# Patient Record
Sex: FEMALE | Race: WHITE | Employment: OTHER | URBAN - METROPOLITAN AREA
[De-identification: names, ages, dates, MRNs, and addresses within clinical notes are randomized per-mention and may not be internally consistent; named-entity substitution may affect disease eponyms.]

---

## 2018-02-01 ENCOUNTER — HOSPITAL ENCOUNTER (OUTPATIENT)
Dept: PHYSICAL THERAPY | Age: 73
Discharge: HOME | End: 2018-02-01
Attending: ORTHOPAEDIC SURGERY | Admitting: ORTHOPAEDIC SURGERY

## 2018-02-01 ENCOUNTER — HOSPITAL ENCOUNTER (OUTPATIENT)
Dept: OCCUPATIONAL THERAPY | Age: 73
Discharge: HOME | End: 2018-01-31
Admitting: ORTHOPAEDIC SURGERY

## 2018-02-01 ENCOUNTER — HOSPITAL ENCOUNTER (OUTPATIENT)
Dept: OCCUPATIONAL THERAPY | Age: 73
Discharge: HOME | End: 2018-02-28
Admitting: ORTHOPAEDIC SURGERY

## 2018-03-01 ENCOUNTER — HOSPITAL ENCOUNTER (OUTPATIENT)
Dept: OCCUPATIONAL THERAPY | Age: 73
Discharge: HOME | End: 2018-03-31
Attending: ORTHOPAEDIC SURGERY | Admitting: ORTHOPAEDIC SURGERY

## 2018-09-10 ENCOUNTER — HOSPITAL ENCOUNTER (OUTPATIENT)
Age: 73
Discharge: HOME | End: 2018-09-10
Payer: MEDICARE

## 2018-09-10 LAB
A/G RATIO: 0.6 (ref 1.1–2.2)
ALBUMIN SERPL-MCNC: 3.7 G/DL (ref 3.4–5)
ALP BLD-CCNC: 393 U/L (ref 40–129)
ALT SERPL-CCNC: 35 U/L (ref 10–40)
ANION GAP SERPL CALCULATED.3IONS-SCNC: 14 MMOL/L (ref 3–16)
AST SERPL-CCNC: 53 U/L (ref 15–37)
BASOPHILS ABSOLUTE: 0.1 K/UL (ref 0–0.2)
BASOPHILS RELATIVE PERCENT: 0.8 %
BILIRUB SERPL-MCNC: 0.5 MG/DL (ref 0–1)
BILIRUBIN DIRECT: <0.2 MG/DL (ref 0–0.3)
BILIRUBIN, INDIRECT: NORMAL MG/DL (ref 0–1)
BUN BLDV-MCNC: 13 MG/DL (ref 7–20)
CALCIUM SERPL-MCNC: 9.9 MG/DL (ref 8.3–10.6)
CHLORIDE BLD-SCNC: 102 MMOL/L (ref 99–110)
CHOLESTEROL, FASTING: 173 MG/DL (ref 0–199)
CO2: 24 MMOL/L (ref 21–32)
CREAT SERPL-MCNC: 0.7 MG/DL (ref 0.6–1.2)
EOSINOPHILS ABSOLUTE: 1 K/UL (ref 0–0.6)
EOSINOPHILS RELATIVE PERCENT: 12.2 %
GFR AFRICAN AMERICAN: >60
GFR NON-AFRICAN AMERICAN: >60
GLOBULIN: 5.8 G/DL
GLUCOSE BLD-MCNC: 113 MG/DL (ref 70–99)
HCT VFR BLD CALC: 42.5 % (ref 36–48)
HDLC SERPL-MCNC: 61 MG/DL (ref 40–60)
HEMOGLOBIN: 14.1 G/DL (ref 12–16)
LDL CHOLESTEROL CALCULATED: 99 MG/DL
LYMPHOCYTES ABSOLUTE: 2.1 K/UL (ref 1–5.1)
LYMPHOCYTES RELATIVE PERCENT: 24.2 %
MCH RBC QN AUTO: 29.8 PG (ref 26–34)
MCHC RBC AUTO-ENTMCNC: 33 G/DL (ref 31–36)
MCV RBC AUTO: 90.2 FL (ref 80–100)
MONOCYTES ABSOLUTE: 0.5 K/UL (ref 0–1.3)
MONOCYTES RELATIVE PERCENT: 5.8 %
NEUTROPHILS ABSOLUTE: 4.9 K/UL (ref 1.7–7.7)
NEUTROPHILS RELATIVE PERCENT: 57 %
PDW BLD-RTO: 14.7 % (ref 12.4–15.4)
PLATELET # BLD: 201 K/UL (ref 135–450)
PMV BLD AUTO: 9.1 FL (ref 5–10.5)
POTASSIUM SERPL-SCNC: 4.4 MMOL/L (ref 3.5–5.1)
RBC # BLD: 4.71 M/UL (ref 4–5.2)
SODIUM BLD-SCNC: 140 MMOL/L (ref 136–145)
TOTAL PROTEIN: 9.5 G/DL (ref 6.4–8.2)
TRIGLYCERIDE, FASTING: 65 MG/DL (ref 0–150)
TSH SERPL DL<=0.05 MIU/L-ACNC: 9.38 UIU/ML (ref 0.27–4.2)
VITAMIN B-12: >2000 PG/ML (ref 211–911)
VITAMIN D 25-HYDROXY: 23.2 NG/ML
VLDLC SERPL CALC-MCNC: 13 MG/DL
WBC # BLD: 8.5 K/UL (ref 4–11)

## 2018-09-10 PROCEDURE — 36415 COLL VENOUS BLD VENIPUNCTURE: CPT

## 2018-09-10 PROCEDURE — 80053 COMPREHEN METABOLIC PANEL: CPT

## 2018-09-10 PROCEDURE — 80061 LIPID PANEL: CPT

## 2018-09-10 PROCEDURE — 82248 BILIRUBIN DIRECT: CPT

## 2018-09-10 PROCEDURE — 84443 ASSAY THYROID STIM HORMONE: CPT

## 2018-09-10 PROCEDURE — 82607 VITAMIN B-12: CPT

## 2018-09-10 PROCEDURE — 82306 VITAMIN D 25 HYDROXY: CPT

## 2018-09-10 PROCEDURE — 85025 COMPLETE CBC W/AUTO DIFF WBC: CPT

## 2019-03-22 ENCOUNTER — HOSPITAL ENCOUNTER (OUTPATIENT)
Age: 74
Discharge: HOME | End: 2019-03-22
Payer: MEDICARE

## 2019-03-22 LAB
ALBUMIN SERPL-MCNC: 3.3 G/DL (ref 3.4–5)
ALP BLD-CCNC: 339 U/L (ref 40–129)
ALT SERPL-CCNC: 29 U/L (ref 10–40)
AST SERPL-CCNC: 51 U/L (ref 15–37)
BILIRUB SERPL-MCNC: 0.5 MG/DL (ref 0–1)
BILIRUBIN DIRECT: <0.2 MG/DL (ref 0–0.3)
BILIRUBIN, INDIRECT: ABNORMAL MG/DL (ref 0–1)
RHEUMATOID FACTOR: 576 IU/ML
T3 TOTAL: 1.47 NG/ML (ref 0.8–2)
T4 TOTAL: 10.2 UG/DL (ref 4.5–10.9)
TOTAL PROTEIN: 9.3 G/DL (ref 6.4–8.2)
TSH SERPL DL<=0.05 MIU/L-ACNC: 2.6 UIU/ML (ref 0.27–4.2)
URIC ACID, SERUM: 7.7 MG/DL (ref 2.6–6)

## 2019-03-22 PROCEDURE — 80076 HEPATIC FUNCTION PANEL: CPT

## 2019-03-22 PROCEDURE — 84436 ASSAY OF TOTAL THYROXINE: CPT

## 2019-03-22 PROCEDURE — 86431 RHEUMATOID FACTOR QUANT: CPT

## 2019-03-22 PROCEDURE — 84550 ASSAY OF BLOOD/URIC ACID: CPT

## 2019-03-22 PROCEDURE — 84480 ASSAY TRIIODOTHYRONINE (T3): CPT

## 2019-03-22 PROCEDURE — 36415 COLL VENOUS BLD VENIPUNCTURE: CPT

## 2019-03-22 PROCEDURE — 84443 ASSAY THYROID STIM HORMONE: CPT

## 2019-03-30 ENCOUNTER — APPOINTMENT (OUTPATIENT)
Dept: GENERAL RADIOLOGY | Age: 74
DRG: 871 | End: 2019-03-30
Payer: MEDICARE

## 2019-03-30 ENCOUNTER — HOSPITAL ENCOUNTER (INPATIENT)
Age: 74
LOS: 5 days | Discharge: HOME | DRG: 871 | End: 2019-04-04
Attending: EMERGENCY MEDICINE | Admitting: INTERNAL MEDICINE
Payer: MEDICARE

## 2019-03-30 DIAGNOSIS — E87.1 HYPONATREMIA: ICD-10-CM

## 2019-03-30 DIAGNOSIS — N17.9 AKI (ACUTE KIDNEY INJURY) (HCC): ICD-10-CM

## 2019-03-30 DIAGNOSIS — A41.9 SEVERE SEPSIS (HCC): Primary | ICD-10-CM

## 2019-03-30 DIAGNOSIS — J18.9 PNEUMONIA DUE TO ORGANISM: ICD-10-CM

## 2019-03-30 DIAGNOSIS — R65.20 SEVERE SEPSIS (HCC): Primary | ICD-10-CM

## 2019-03-30 PROBLEM — E86.0 DEHYDRATION: Status: ACTIVE | Noted: 2019-03-30

## 2019-03-30 PROBLEM — Z87.891 HISTORY OF TOBACCO ABUSE: Status: ACTIVE | Noted: 2019-03-30

## 2019-03-30 LAB
A/G RATIO: 0.5 (ref 1.1–2.2)
ALBUMIN SERPL-MCNC: 2.7 G/DL (ref 3.4–5)
ALP BLD-CCNC: 238 U/L (ref 40–129)
ALT SERPL-CCNC: 13 U/L (ref 10–40)
ANION GAP SERPL CALCULATED.3IONS-SCNC: 13 MMOL/L (ref 3–16)
AST SERPL-CCNC: 17 U/L (ref 15–37)
BASOPHILS ABSOLUTE: 0.1 K/UL (ref 0–0.2)
BASOPHILS RELATIVE PERCENT: 0.2 %
BILIRUB SERPL-MCNC: 1 MG/DL (ref 0–1)
BUN BLDV-MCNC: 36 MG/DL (ref 7–20)
CALCIUM SERPL-MCNC: 9 MG/DL (ref 8.3–10.6)
CHLORIDE BLD-SCNC: 92 MMOL/L (ref 99–110)
CO2: 19 MMOL/L (ref 21–32)
CREAT SERPL-MCNC: 1.2 MG/DL (ref 0.6–1.2)
EOSINOPHILS ABSOLUTE: 0 K/UL (ref 0–0.6)
EOSINOPHILS RELATIVE PERCENT: 0 %
GFR AFRICAN AMERICAN: 53
GFR NON-AFRICAN AMERICAN: 44
GLOBULIN: 5.5 G/DL
GLUCOSE BLD-MCNC: 194 MG/DL (ref 70–99)
HCT VFR BLD CALC: 36 % (ref 36–48)
HEMOGLOBIN: 12 G/DL (ref 12–16)
LACTIC ACID, SEPSIS: 1.3 MMOL/L (ref 0.4–1.9)
LACTIC ACID: 1 MMOL/L (ref 0.4–2)
LYMPHOCYTES ABSOLUTE: 1 K/UL (ref 1–5.1)
LYMPHOCYTES RELATIVE PERCENT: 3.1 %
MCH RBC QN AUTO: 29.7 PG (ref 26–34)
MCHC RBC AUTO-ENTMCNC: 33.4 G/DL (ref 31–36)
MCV RBC AUTO: 88.9 FL (ref 80–100)
MONOCYTES ABSOLUTE: 1.2 K/UL (ref 0–1.3)
MONOCYTES RELATIVE PERCENT: 3.7 %
NEUTROPHILS ABSOLUTE: 29.2 K/UL (ref 1.7–7.7)
NEUTROPHILS RELATIVE PERCENT: 93 %
PDW BLD-RTO: 13.8 % (ref 12.4–15.4)
PLATELET # BLD: 208 K/UL (ref 135–450)
PMV BLD AUTO: 8.5 FL (ref 5–10.5)
POTASSIUM SERPL-SCNC: 3.8 MMOL/L (ref 3.5–5.1)
RAPID INFLUENZA  B AGN: NEGATIVE
RAPID INFLUENZA A AGN: NEGATIVE
RBC # BLD: 4.05 M/UL (ref 4–5.2)
SODIUM BLD-SCNC: 124 MMOL/L (ref 136–145)
TOTAL PROTEIN: 8.2 G/DL (ref 6.4–8.2)
WBC # BLD: 31.4 K/UL (ref 4–11)

## 2019-03-30 PROCEDURE — 2580000003 HC RX 258: Performed by: INTERNAL MEDICINE

## 2019-03-30 PROCEDURE — 36415 COLL VENOUS BLD VENIPUNCTURE: CPT

## 2019-03-30 PROCEDURE — 87449 NOS EACH ORGANISM AG IA: CPT

## 2019-03-30 PROCEDURE — 94667 MNPJ CHEST WALL 1ST: CPT

## 2019-03-30 PROCEDURE — 87804 INFLUENZA ASSAY W/OPTIC: CPT

## 2019-03-30 PROCEDURE — 87070 CULTURE OTHR SPECIMN AEROBIC: CPT

## 2019-03-30 PROCEDURE — 80053 COMPREHEN METABOLIC PANEL: CPT

## 2019-03-30 PROCEDURE — 96361 HYDRATE IV INFUSION ADD-ON: CPT

## 2019-03-30 PROCEDURE — 2580000003 HC RX 258: Performed by: EMERGENCY MEDICINE

## 2019-03-30 PROCEDURE — 6360000002 HC RX W HCPCS: Performed by: INTERNAL MEDICINE

## 2019-03-30 PROCEDURE — 96365 THER/PROPH/DIAG IV INF INIT: CPT

## 2019-03-30 PROCEDURE — 87040 BLOOD CULTURE FOR BACTERIA: CPT

## 2019-03-30 PROCEDURE — 83605 ASSAY OF LACTIC ACID: CPT

## 2019-03-30 PROCEDURE — 6370000000 HC RX 637 (ALT 250 FOR IP): Performed by: INTERNAL MEDICINE

## 2019-03-30 PROCEDURE — 93005 ELECTROCARDIOGRAM TRACING: CPT | Performed by: EMERGENCY MEDICINE

## 2019-03-30 PROCEDURE — 87077 CULTURE AEROBIC IDENTIFY: CPT

## 2019-03-30 PROCEDURE — 87186 SC STD MICRODIL/AGAR DIL: CPT

## 2019-03-30 PROCEDURE — 6370000000 HC RX 637 (ALT 250 FOR IP): Performed by: EMERGENCY MEDICINE

## 2019-03-30 PROCEDURE — 94150 VITAL CAPACITY TEST: CPT

## 2019-03-30 PROCEDURE — 1200000000 HC SEMI PRIVATE

## 2019-03-30 PROCEDURE — 6360000002 HC RX W HCPCS: Performed by: EMERGENCY MEDICINE

## 2019-03-30 PROCEDURE — 85025 COMPLETE CBC W/AUTO DIFF WBC: CPT

## 2019-03-30 PROCEDURE — 94640 AIRWAY INHALATION TREATMENT: CPT

## 2019-03-30 PROCEDURE — 87205 SMEAR GRAM STAIN: CPT

## 2019-03-30 PROCEDURE — 99291 CRITICAL CARE FIRST HOUR: CPT

## 2019-03-30 PROCEDURE — 71046 X-RAY EXAM CHEST 2 VIEWS: CPT

## 2019-03-30 RX ORDER — SODIUM CHLORIDE 0.9 % (FLUSH) 0.9 %
10 SYRINGE (ML) INJECTION PRN
Status: DISCONTINUED | OUTPATIENT
Start: 2019-03-30 | End: 2019-04-04 | Stop reason: HOSPADM

## 2019-03-30 RX ORDER — SODIUM CHLORIDE 0.9 % (FLUSH) 0.9 %
10 SYRINGE (ML) INJECTION EVERY 12 HOURS SCHEDULED
Status: DISCONTINUED | OUTPATIENT
Start: 2019-03-30 | End: 2019-04-04 | Stop reason: HOSPADM

## 2019-03-30 RX ORDER — ACETAMINOPHEN 325 MG/1
650 TABLET ORAL EVERY 4 HOURS PRN
Status: DISCONTINUED | OUTPATIENT
Start: 2019-03-30 | End: 2019-04-02

## 2019-03-30 RX ORDER — SODIUM CHLORIDE 9 MG/ML
INJECTION, SOLUTION INTRAVENOUS CONTINUOUS
Status: DISCONTINUED | OUTPATIENT
Start: 2019-03-30 | End: 2019-04-01

## 2019-03-30 RX ORDER — LEVOTHYROXINE SODIUM 0.05 MG/1
TABLET ORAL
Refills: 1 | COMMUNITY
Start: 2018-12-31

## 2019-03-30 RX ORDER — LANOLIN ALCOHOL/MO/W.PET/CERES
1000 CREAM (GRAM) TOPICAL DAILY
COMMUNITY

## 2019-03-30 RX ORDER — ALBUTEROL SULFATE 90 UG/1
2 AEROSOL, METERED RESPIRATORY (INHALATION) EVERY 4 HOURS PRN
Status: DISCONTINUED | OUTPATIENT
Start: 2019-03-30 | End: 2019-03-31

## 2019-03-30 RX ORDER — IPRATROPIUM BROMIDE AND ALBUTEROL SULFATE 2.5; .5 MG/3ML; MG/3ML
1 SOLUTION RESPIRATORY (INHALATION)
Status: DISCONTINUED | OUTPATIENT
Start: 2019-03-30 | End: 2019-03-31

## 2019-03-30 RX ORDER — ONDANSETRON 2 MG/ML
4 INJECTION INTRAMUSCULAR; INTRAVENOUS EVERY 6 HOURS PRN
Status: DISCONTINUED | OUTPATIENT
Start: 2019-03-30 | End: 2019-04-04 | Stop reason: HOSPADM

## 2019-03-30 RX ORDER — GUAIFENESIN/DEXTROMETHORPHAN 100-10MG/5
10 SYRUP ORAL 3 TIMES DAILY PRN
Status: DISCONTINUED | OUTPATIENT
Start: 2019-03-30 | End: 2019-04-04 | Stop reason: HOSPADM

## 2019-03-30 RX ORDER — DOXYCYCLINE HYCLATE 100 MG
100 TABLET ORAL ONCE
Status: COMPLETED | OUTPATIENT
Start: 2019-03-30 | End: 2019-03-30

## 2019-03-30 RX ORDER — LEVOTHYROXINE SODIUM 0.05 MG/1
50 TABLET ORAL DAILY
Status: DISCONTINUED | OUTPATIENT
Start: 2019-03-31 | End: 2019-04-04 | Stop reason: HOSPADM

## 2019-03-30 RX ORDER — 0.9 % SODIUM CHLORIDE 0.9 %
30 INTRAVENOUS SOLUTION INTRAVENOUS ONCE
Status: COMPLETED | OUTPATIENT
Start: 2019-03-30 | End: 2019-03-30

## 2019-03-30 RX ORDER — ACETAMINOPHEN 500 MG
1000 TABLET ORAL ONCE
Status: COMPLETED | OUTPATIENT
Start: 2019-03-30 | End: 2019-03-30

## 2019-03-30 RX ADMIN — SODIUM CHLORIDE: 9 INJECTION, SOLUTION INTRAVENOUS at 20:55

## 2019-03-30 RX ADMIN — ENOXAPARIN SODIUM 40 MG: 40 INJECTION SUBCUTANEOUS at 20:55

## 2019-03-30 RX ADMIN — ACETAMINOPHEN 1000 MG: 500 TABLET ORAL at 17:06

## 2019-03-30 RX ADMIN — DOXYCYCLINE HYCLATE 100 MG: 100 TABLET, COATED ORAL at 17:06

## 2019-03-30 RX ADMIN — CEFTRIAXONE SODIUM 1 G: 1 INJECTION, POWDER, FOR SOLUTION INTRAMUSCULAR; INTRAVENOUS at 17:06

## 2019-03-30 RX ADMIN — SODIUM CHLORIDE 1000 ML: 9 INJECTION, SOLUTION INTRAVENOUS at 17:07

## 2019-03-30 RX ADMIN — IPRATROPIUM BROMIDE AND ALBUTEROL SULFATE 1 AMPULE: .5; 3 SOLUTION RESPIRATORY (INHALATION) at 21:04

## 2019-03-30 ASSESSMENT — PAIN DESCRIPTION - ORIENTATION
ORIENTATION: RIGHT
ORIENTATION: RIGHT

## 2019-03-30 ASSESSMENT — PAIN DESCRIPTION - LOCATION
LOCATION: RIB CAGE
LOCATION: RIB CAGE

## 2019-03-30 ASSESSMENT — PAIN SCALES - GENERAL
PAINLEVEL_OUTOF10: 8
PAINLEVEL_OUTOF10: 6
PAINLEVEL_OUTOF10: 8
PAINLEVEL_OUTOF10: 0

## 2019-03-30 ASSESSMENT — PAIN DESCRIPTION - PAIN TYPE
TYPE: ACUTE PAIN
TYPE: ACUTE PAIN

## 2019-03-30 NOTE — PROGRESS NOTES
Patient admitted to room 220 from ED  Patient oriented to room, call light, bed rails, phone, lights and bathroom. Patient instructed about the schedule of the day including: vital sign frequency, lab draws, possible tests, frequency of MD and staff rounds, including RN/MD rounding together at bedside, daily weights, and I &O's. Patient instructed about prescribed diet, how to use 8MENU, and television. No bed alarm in place, patient aware of placement and reason. patient aware of placement and reason. Bed locked, in lowest position, side rails up 2/4, call light within reach. Will continue to monitor.   MISTIAZ

## 2019-03-31 LAB
ANION GAP SERPL CALCULATED.3IONS-SCNC: 10 MMOL/L (ref 3–16)
BASOPHILS ABSOLUTE: 0 K/UL (ref 0–0.2)
BASOPHILS RELATIVE PERCENT: 0.2 %
BUN BLDV-MCNC: 29 MG/DL (ref 7–20)
CALCIUM SERPL-MCNC: 8.4 MG/DL (ref 8.3–10.6)
CHLORIDE BLD-SCNC: 100 MMOL/L (ref 99–110)
CO2: 18 MMOL/L (ref 21–32)
CREAT SERPL-MCNC: 0.9 MG/DL (ref 0.6–1.2)
EKG ATRIAL RATE: 94 BPM
EKG DIAGNOSIS: NORMAL
EKG P AXIS: 67 DEGREES
EKG P-R INTERVAL: 132 MS
EKG Q-T INTERVAL: 348 MS
EKG QRS DURATION: 90 MS
EKG QTC CALCULATION (BAZETT): 435 MS
EKG R AXIS: 76 DEGREES
EKG T AXIS: 50 DEGREES
EKG VENTRICULAR RATE: 94 BPM
EOSINOPHILS ABSOLUTE: 0.4 K/UL (ref 0–0.6)
EOSINOPHILS RELATIVE PERCENT: 1.9 %
GFR AFRICAN AMERICAN: >60
GFR NON-AFRICAN AMERICAN: >60
GLUCOSE BLD-MCNC: 125 MG/DL (ref 70–99)
HCT VFR BLD CALC: 34 % (ref 36–48)
HEMOGLOBIN: 11.4 G/DL (ref 12–16)
L. PNEUMOPHILA SEROGP 1 UR AG: NORMAL
LYMPHOCYTES ABSOLUTE: 2 K/UL (ref 1–5.1)
LYMPHOCYTES RELATIVE PERCENT: 8.5 %
MAGNESIUM: 2.2 MG/DL (ref 1.8–2.4)
MCH RBC QN AUTO: 29.8 PG (ref 26–34)
MCHC RBC AUTO-ENTMCNC: 33.5 G/DL (ref 31–36)
MCV RBC AUTO: 88.8 FL (ref 80–100)
MONOCYTES ABSOLUTE: 1 K/UL (ref 0–1.3)
MONOCYTES RELATIVE PERCENT: 4.5 %
NEUTROPHILS ABSOLUTE: 20 K/UL (ref 1.7–7.7)
NEUTROPHILS RELATIVE PERCENT: 84.9 %
PDW BLD-RTO: 13.7 % (ref 12.4–15.4)
PLATELET # BLD: 178 K/UL (ref 135–450)
PMV BLD AUTO: 8.7 FL (ref 5–10.5)
POTASSIUM REFLEX MAGNESIUM: 3.3 MMOL/L (ref 3.5–5.1)
RBC # BLD: 3.83 M/UL (ref 4–5.2)
SODIUM BLD-SCNC: 128 MMOL/L (ref 136–145)
STREP PNEUMONIAE ANTIGEN, URINE: NORMAL
WBC # BLD: 23.5 K/UL (ref 4–11)

## 2019-03-31 PROCEDURE — 85025 COMPLETE CBC W/AUTO DIFF WBC: CPT

## 2019-03-31 PROCEDURE — 1200000000 HC SEMI PRIVATE

## 2019-03-31 PROCEDURE — 94668 MNPJ CHEST WALL SBSQ: CPT

## 2019-03-31 PROCEDURE — 90670 PCV13 VACCINE IM: CPT | Performed by: INTERNAL MEDICINE

## 2019-03-31 PROCEDURE — 6370000000 HC RX 637 (ALT 250 FOR IP): Performed by: INTERNAL MEDICINE

## 2019-03-31 PROCEDURE — 6360000002 HC RX W HCPCS: Performed by: INTERNAL MEDICINE

## 2019-03-31 PROCEDURE — 94640 AIRWAY INHALATION TREATMENT: CPT

## 2019-03-31 PROCEDURE — 2500000003 HC RX 250 WO HCPCS: Performed by: INTERNAL MEDICINE

## 2019-03-31 PROCEDURE — 83735 ASSAY OF MAGNESIUM: CPT

## 2019-03-31 PROCEDURE — 6370000000 HC RX 637 (ALT 250 FOR IP): Performed by: NURSE PRACTITIONER

## 2019-03-31 PROCEDURE — G0009 ADMIN PNEUMOCOCCAL VACCINE: HCPCS | Performed by: INTERNAL MEDICINE

## 2019-03-31 PROCEDURE — 2580000003 HC RX 258: Performed by: INTERNAL MEDICINE

## 2019-03-31 PROCEDURE — 80048 BASIC METABOLIC PNL TOTAL CA: CPT

## 2019-03-31 PROCEDURE — 36415 COLL VENOUS BLD VENIPUNCTURE: CPT

## 2019-03-31 PROCEDURE — 93010 ELECTROCARDIOGRAM REPORT: CPT | Performed by: INTERNAL MEDICINE

## 2019-03-31 RX ORDER — ALBUTEROL SULFATE 90 UG/1
2 AEROSOL, METERED RESPIRATORY (INHALATION) EVERY 4 HOURS PRN
Status: DISCONTINUED | OUTPATIENT
Start: 2019-03-31 | End: 2019-04-04 | Stop reason: HOSPADM

## 2019-03-31 RX ORDER — IPRATROPIUM BROMIDE AND ALBUTEROL SULFATE 2.5; .5 MG/3ML; MG/3ML
1 SOLUTION RESPIRATORY (INHALATION)
Status: DISCONTINUED | OUTPATIENT
Start: 2019-03-31 | End: 2019-04-04 | Stop reason: HOSPADM

## 2019-03-31 RX ORDER — POTASSIUM CHLORIDE 20 MEQ/1
40 TABLET, EXTENDED RELEASE ORAL 2 TIMES DAILY
Status: COMPLETED | OUTPATIENT
Start: 2019-03-31 | End: 2019-03-31

## 2019-03-31 RX ADMIN — DOXYCYCLINE 100 MG: 100 INJECTION, POWDER, LYOPHILIZED, FOR SOLUTION INTRAVENOUS at 06:06

## 2019-03-31 RX ADMIN — ACETAMINOPHEN 650 MG: 325 TABLET ORAL at 20:39

## 2019-03-31 RX ADMIN — DOXYCYCLINE 100 MG: 100 INJECTION, POWDER, LYOPHILIZED, FOR SOLUTION INTRAVENOUS at 17:58

## 2019-03-31 RX ADMIN — CEFTRIAXONE SODIUM 1 G: 1 INJECTION, POWDER, FOR SOLUTION INTRAMUSCULAR; INTRAVENOUS at 15:43

## 2019-03-31 RX ADMIN — GUAIFENESIN AND DEXTROMETHORPHAN 10 ML: 100; 10 SYRUP ORAL at 20:40

## 2019-03-31 RX ADMIN — SODIUM CHLORIDE: 9 INJECTION, SOLUTION INTRAVENOUS at 06:05

## 2019-03-31 RX ADMIN — IPRATROPIUM BROMIDE AND ALBUTEROL SULFATE 1 AMPULE: .5; 3 SOLUTION RESPIRATORY (INHALATION) at 19:41

## 2019-03-31 RX ADMIN — SODIUM CHLORIDE: 9 INJECTION, SOLUTION INTRAVENOUS at 20:40

## 2019-03-31 RX ADMIN — PNEUMOCOCCAL 13-VALENT CONJUGATE VACCINE 0.5 ML: 2.2; 2.2; 2.2; 2.2; 2.2; 4.4; 2.2; 2.2; 2.2; 2.2; 2.2; 2.2; 2.2 INJECTION, SUSPENSION INTRAMUSCULAR at 09:21

## 2019-03-31 RX ADMIN — POTASSIUM CHLORIDE 40 MEQ: 20 TABLET, EXTENDED RELEASE ORAL at 20:39

## 2019-03-31 RX ADMIN — ACETAMINOPHEN 650 MG: 325 TABLET ORAL at 11:16

## 2019-03-31 RX ADMIN — GUAIFENESIN AND DEXTROMETHORPHAN 10 ML: 100; 10 SYRUP ORAL at 08:01

## 2019-03-31 RX ADMIN — IPRATROPIUM BROMIDE AND ALBUTEROL SULFATE 1 AMPULE: .5; 3 SOLUTION RESPIRATORY (INHALATION) at 11:52

## 2019-03-31 RX ADMIN — IPRATROPIUM BROMIDE AND ALBUTEROL SULFATE 1 AMPULE: .5; 3 SOLUTION RESPIRATORY (INHALATION) at 08:18

## 2019-03-31 RX ADMIN — ENOXAPARIN SODIUM 40 MG: 40 INJECTION SUBCUTANEOUS at 09:21

## 2019-03-31 RX ADMIN — POTASSIUM CHLORIDE 40 MEQ: 20 TABLET, EXTENDED RELEASE ORAL at 09:21

## 2019-03-31 RX ADMIN — LEVOTHYROXINE SODIUM 50 MCG: 50 TABLET ORAL at 06:06

## 2019-03-31 ASSESSMENT — PAIN DESCRIPTION - ONSET: ONSET: GRADUAL

## 2019-03-31 ASSESSMENT — PAIN SCALES - GENERAL
PAINLEVEL_OUTOF10: 5
PAINLEVEL_OUTOF10: 0
PAINLEVEL_OUTOF10: 5

## 2019-03-31 ASSESSMENT — PAIN DESCRIPTION - LOCATION: LOCATION: RIB CAGE

## 2019-03-31 ASSESSMENT — PAIN DESCRIPTION - PAIN TYPE: TYPE: ACUTE PAIN

## 2019-03-31 ASSESSMENT — PAIN DESCRIPTION - FREQUENCY: FREQUENCY: INTERMITTENT

## 2019-03-31 ASSESSMENT — PAIN DESCRIPTION - ORIENTATION: ORIENTATION: RIGHT

## 2019-03-31 ASSESSMENT — PAIN DESCRIPTION - DESCRIPTORS: DESCRIPTORS: DISCOMFORT

## 2019-03-31 NOTE — FLOWSHEET NOTE
03/31/19 0845   Encounter Summary   Services provided to: Patient and family together  (granddaughter at bedside)   Referral/Consult From: Nurse   Support System Children;Family members   Continue Visiting   (3-31, AD info. No spiritual needs)   Complexity of Encounter Moderate   Length of Encounter 15 minutes   Routine   Type Initial   Assessment Calm; Approachable;Coping   Intervention Active listening;Explored feelings, thoughts, concerns;Nurtured hope   Outcome Engaged in [employer] (For Healthcare)   Healthcare Directive No, patient does not have an advance directive for healthcare treatment   Information on [address] given;Documents explained    provided forms and education on advance directives. She states she would likely name one of her children. I offered to help her complete documents or answer further questions when desires. No spiritual needs. PRN follow-up.

## 2019-03-31 NOTE — H&P
Hospital Medicine History & Physical      PCP: Jamir Padron MD    Date of Admission: 3/30/2019    Date of Service: Pt seen/examined on 3/30/2019 and Admitted to Inpatient with expected LOS greater than two midnights due to medical therapy. Chief Complaint:  Sob, rib pain      History Of Present Illness:   75 y.o. female who presented to [facility] with above complaints  Patient presented to the ER with complaints of right-sided chest pain, cough, shortness of breath, fever that started about 5 days back. On Tuesday night she noticed worsening shortness of breath and cough associated with pleuritic right-sided chest pain, producing minimal amount of sputum, no hemoptysis. Also reported subjective fevers since yesterday but no chills or rigors. Patient reports she has had pneumonia in the past 3 or 4 times but none requiring hospitalization. She is a prior smoker who quit about 20 years back. Denies any alcohol use. Denies sick contacts or recent travel. Denies any abdominal pain, nausea, vomiting or diarrhea. Past Medical History:          Diagnosis Date    Pneumonia     Thyroid disease        Past Surgical History:          Procedure Laterality Date    HYSTERECTOMY         Medications Prior to Admission:      Prior to Admission medications    Medication Sig Start Date End Date Taking? Authorizing Provider   levothyroxine (SYNTHROID) 50 MCG tablet TK 1 T PO QD 12/31/18  Yes Historical Provider, MD   vitamin B-12 (CYANOCOBALAMIN) 1000 MCG tablet Take 1,000 mcg by mouth   Yes Historical Provider, MD   vitamin D (CHOLECALCIFEROL) 1000 UNIT TABS tablet Take 1,000 Units by mouth   Yes Historical Provider, MD   guaifenesin-dextromethorphan (ROBITUSSIN DM) 100-10 MG/5ML syrup Take 10 mLs by mouth 3 times daily as needed for Cough. 1/1/12  Yes Elvis Nolen MD   albuterol (PROVENTIL HFA) 108 (90 BASE) MCG/ACT inhaler Inhale 2 puffs into the lungs every 4 hours as needed for Shortness of Breath. 1/1/12  Yes Sai Gonzales MD       Allergies:  Patient has no known allergies. Social History:      The patient currently lives at home    TOBACCO:   reports that she quit smoking about 19 years ago. She smoked 0.25 packs per day. She has never used smokeless tobacco.  ETOH:   reports that she drinks about 0.6 oz of alcohol per week. Family History: Reviewed in detail and negative for DM, CAD, Cancer, CVA. REVIEW OF SYSTEMS:   Pertinent positives as noted in the HPI. All other systems reviewed and negative. PHYSICAL EXAM PERFORMED:    BP (!) 94/53   Pulse 84   Temp 97.7 °F (36.5 °C) (Oral)   Resp 16   Ht 5' 3\" (1.6 m)   Wt 109 lb 12.8 oz (49.8 kg)   SpO2 92%   BMI 19.45 kg/m²     General appearance:  No apparent distress, appears stated age and cooperative. HEENT:  Normal cephalic, atraumatic without obvious deformity. Pupils equal, round, and reactive to light. Extra ocular muscles intact. Conjunctivae/corneas clear. Neck: Supple, with full range of motion. No jugular venous distention. Trachea midline. Respiratory:  Normal respiratory effort. Clear to auscultation, bilaterally without Rales/Wheezes/Rhonchi. Cardiovascular:  Regular rate and rhythm with normal S1/S2 without murmurs, rubs or gallops. Abdomen: Soft, non-tender, non-distended with normal bowel sounds. Musculoskeletal:  No clubbing, cyanosis or edema bilaterally. Full range of motion without deformity. Skin: Skin color, texture, turgor normal.  No rashes or lesions. Neurologic:  Neurovascularly intact without any focal sensory/motor deficits.  Cranial nerves: II-XII intact, grossly non-focal.  Psychiatric:  Alert and oriented, thought content appropriate, normal insight  Capillary Refill: Brisk,< 3 seconds   Peripheral Pulses: +2 palpable, equal bilaterally       Labs:     Recent Labs     03/30/19  1600   WBC 31.4*   HGB 12.0   HCT 36.0        Recent Labs     03/30/19  1600   *   K 3.8   CL 92*   CO2 19* BUN 36*   CREATININE 1.2   CALCIUM 9.0     Recent Labs     03/30/19  1600   AST 17   ALT 13   BILITOT 1.0   ALKPHOS 238*     No results for input(s): INR in the last 72 hours. No results for input(s): Evern Socorro in the last 72 hours. Urinalysis:    No results found for: [address], [address], Sindi Johnson [facility] [address], [address] [facility], BLOODU, [address], [facility] 994    Radiology:     CXR: I have reviewed the CXR with the following interpretation: Right upper lobe infiltrates, linear infiltrates in the left lower lobe which appears chronic    EKG:  I have reviewed the EKG with the following interpretation: NSR, rate 94, no acute ischemic changes, normal intervals    XR CHEST STANDARD (2 VW)   Final Result   1. Probable right upper lobe pneumonia   2. Linear infiltrates in the left lower lobe most likely represents scarring   from the previous noted left lower lobe pneumonia             ASSESSMENT:PLAN:    Sepsis due to CAP (community acquired pneumonia)  Patient presented with dyspnea, cough, fever, tachycardia, leukocytosis with chest imaging suggesting pneumonia  - Start on IV Rocephin plus Doxy  - Sputum/blood culture, sputum Gram stain, strep pneumo and legionella urine antigen  - Acapella  - Not requiring any supplemental O2 at this time  - MRSA swab, influenza a/b swab  - Lactate<2    Dehydration  Evident by hyponatremia/hypochloremia and clinically dehydrated  - IV NS infusion  - Recheck labs in a.m. History of tobacco abuse    Hypothyroidism - resume Synthroid, TSH normal in March 2019      DVT Prophylaxis: lmwh  Diet: DIET GENERAL;  Code Status: Full Code    PT/OT Eval Status: NA    Dispo - IP stay       Lloyd Woodard MD    Thank you Gudelia Gonzales MD for the opportunity to be involved in this patient's care. If you have any questions or concerns please feel free to contact me at [phone].

## 2019-03-31 NOTE — ED PROVIDER NOTES
service: Not on file     Active member of club or organization: Not on file     Attends meetings of clubs or organizations: Not on file     Relationship status: Not on file    Intimate partner violence:     Fear of current or ex partner: Not on file     Emotionally abused: Not on file     Physically abused: Not on file     Forced sexual activity: Not on file   Other Topics Concern    Not on file   Social History Narrative    Not on file     Current Facility-Administered Medications   Medication Dose Route Frequency Provider Last Rate Last Dose    albuterol sulfate  (90 Base) MCG/ACT inhaler 2 puff  2 puff Inhalation Q4H PRN Marli Feliz MD        guaiFENesin-dextromethorphan (ROBITUSSIN DM) 100-10 MG/5ML syrup 10 mL  10 mL Oral TID PRN Marli Feliz MD       [facility] [START ON 3/31/2019] levothyroxine (SYNTHROID) tablet 50 mcg  50 mcg Oral Daily Marli Feliz MD        0.9 % sodium chloride infusion   Intravenous Continuous Marli Feliz MD 75 mL/hr at 03/30/19 2055      sodium chloride flush 0.9 % injection 10 mL  10 mL Intravenous 2 times per day Marli Feliz MD        sodium chloride flush 0.9 % injection 10 mL  10 mL Intravenous PRN Marli Feliz MD        magnesium hydroxide (MILK OF MAGNESIA) 400 MG/5ML suspension 30 mL  30 mL Oral Daily PRN Marli Feliz MD        ondansetron (ZOFRAN) injection 4 mg  4 mg Intravenous Q6H PRN Marli Feliz MD        enoxaparin (LOVENOX) injection 40 mg  40 mg Subcutaneous Daily Marli Feliz MD   40 mg at 03/30/19 2055    ipratropium-albuterol (DUONEB) nebulizer solution 1 ampule  1 ampule Inhalation Q4H WA Marli Feliz MD   1 ampule at 03/30/19 2104    acetaminophen (TYLENOL) tablet 650 mg  650 mg Oral Q4H PRN Marli Feliz MD       [facility] [START ON 3/31/2019] cefTRIAXone (ROCEPHIN) 1 g IVPB in 50 mL D5W minibag  1 g Intravenous Q24H Marli Feliz MD        And    [START ON 3/31/2019] doxycycline (VIBRAMYCIN) 100 mg in dextrose 5 % 100 mL IVPB  100 mg Intravenous Q12H Marli Feliz MD       [facility] [START ON 3/31/2019] pneumococcal 13-valent conjugate (PREVNAR) injection 0.5 mL  0.5 mL Intramuscular Once Marli Feliz MD         No Known Allergies    REVIEW OF SYSTEMS  10 systems reviewed, pertinent positives per HPI otherwise noted to be negative    PHYSICAL EXAM  BP (!) 94/53   Pulse 84   Temp 97.7 °F (36.5 °C) (Oral)   Resp 16   Ht 5' 3\" (1.6 m)   Wt 109 lb 12.8 oz (49.8 kg)   SpO2 94%   BMI 19.45 kg/m²   GENERAL APPEARANCE: Awake and alert. Cooperative. In no distress. cachectic  HEAD: Normocephalic. Atraumatic. EYES: PERRL. EOM's grossly intact. ENT: Mucous membranes are pink and moist.   NECK: Supple. HEART: RRR. No murmurs. LUNGS: Respirations unlabored. CTAB. Good air exchange. ABDOMEN: Soft. Non-distended. Non-tender. No masses. No organomegaly. No guarding or rebound. EXTREMITIES: No peripheral edema. Moves all extremities equally. All extremities neurovascularly intact. SKIN: Warm and dry. No acute rashes. NEUROLOGICAL: Alert and oriented. Strength 5/5, sensation intact. Gait normal.   PSYCHIATRIC: Normal mood and affect. No HI or SI expressed to me.     RADIOLOGY    See below     EKG:     See below      ED COURSE/MDM        ED Course as of Mar 30 2300   Sat Mar 30, 2019   2253 Lactate, Sepsis:    Lactic Acid, Sepsis 1.3 [WL]   2253 Comprehensive Metabolic Panel(!):    Sodium 124(!)   Potassium 3.8   Chloride 92(!)   CO2 19(!)   Anion Gap 13   Glucose 194(!)   BUN 36(!)   Creatinine 1.2   GFR Non- 44(!)   GFR  53(!)   Calcium 9.0   Total Protein 8.2   Albumin 2.7(!)   Albumin/Globulin Ratio 0.5(!)   Bilirubin 1.0   Alk Phos 238(!)   ALT 13   AST 17   Globulin 5.5 [WL]   2253 Rapid influenza A/B antigens:    Rapid Influenza A Ag Negative   Rapid Influenza B Ag Negative [WL]   2253 Michael, hyponatremia and severe leukocytosis noted. Findings c/w severe sepsis. Given bolus, broad spectrum abx for CAP. Pt hemodynamically stable at this time . Admitted for further management   CBC Auto Differential(!):    WBC 31.4(!)   RBC 4.05   Hemoglobin Quant 12.0   Hematocrit 36.0   MCV 88.9   MCH 29.7   MCHC 33.4   RDW 13.8   Platelet Count 310   MPV 8.5   Neutrophils % 93.0   Lymphocyte % 3.1   Monocytes % 3.7   Eosinophils % 0.0   Basophils % 0.2   Neutrophils # 29.2(!)   Lymphocytes # 1.0   Monocytes # 1.2   Eosinophils # 0.0   Basophils # 0.1 [WL]   4536 [employer]. Nl axis. Qtc 435. No acute st t changes   EKG 12 Lead [WL]   2255 RUL pna   XR CHEST STANDARD (2 VW) [WL]      ED Course User Index  [WL] Tamika Packer DO       Old records were reviewed when applicable. The ED course and plan were reviewed and results discussed with the pt, hospitalist    CLINICAL IMPRESSION and DISPOSITION  Sommer Bradford was stable and diagnosed with severe sepsis, jeaneth, pneumonia, hyponatremia    Patient was treated with  Ivf, rocephin/doxy      CRITICAL CARE TIME:  The total Critical Care time is 30 minutes which excludes separately billable procedures.                          Tamika Packer DO  03/30/19 5241

## 2019-03-31 NOTE — PROGRESS NOTES
Hospitalist Progress Note      PCP: Lobito Bonilla MD    Date of Admission: 3/30/2019    Chief Complaint: SOB, rib pain    Hospital Course:  75 y.o. female who presented to [facility] with complaints of right-sided chest pain, cough, shortness of breath, fever that started about 5 days back. On Tuesday night she noticed worsening shortness of breath and cough associated with pleuritic right-sided chest pain, producing minimal amount of sputum, no hemoptysis. Also reported subjective fevers since yesterday but no chills or rigors. Patient reports she has had pneumonia in the past 3 or 4 times but none requiring hospitalization. She is a prior smoker who quit about 20 years back. Denies any alcohol use. Denies sick contacts or recent travel. Denies any abdominal pain, nausea, vomiting or diarrhea. Subjective: Feels a bit better today. Labs improving. On room air. Family at bedside.         Medications:  Reviewed    Infusion Medications    sodium chloride 75 mL/hr at 03/31/19 0605     Scheduled Medications    ipratropium-albuterol  1 ampule Inhalation Q4H WA    potassium chloride  40 mEq Oral BID    levothyroxine  50 mcg Oral Daily    sodium chloride flush  10 mL Intravenous 2 times per day    enoxaparin  40 mg Subcutaneous Daily    cefTRIAXone (ROCEPHIN) IV  1 g Intravenous Q24H    And    doxycycline (VIBRAMYCIN) IV  100 mg Intravenous Q12H     PRN Meds: albuterol sulfate HFA, guaiFENesin-dextromethorphan, sodium chloride flush, magnesium hydroxide, ondansetron, acetaminophen      Intake/Output Summary (Last 24 hours) at 3/31/2019 1250  Last data filed at 3/31/2019 1229  Gross per 24 hour   Intake 1730 ml   Output 1400 ml   Net 330 ml       Physical Exam Performed:    /64   Pulse 86   Temp 97.7 °F (36.5 °C) (Oral)   Resp 16   Ht 5' 3\" (1.6 m)   Wt 109 lb 12.8 oz (49.8 kg)   SpO2 92%   BMI 19.45 kg/m²     General appearance: Pleasant, elderly female in no apparent distress, appears stated age and cooperative. HEENT: Pupils equal, round, and reactive to light. Conjunctivae/corneas clear. Neck: Supple, with full range of motion. No jugular venous distention. Trachea midline. Respiratory:  Normal respiratory effort. Clear to auscultation, bilaterally without Rales/Wheezes/Rhonchi. Cardiovascular: Regular rate and rhythm with normal S1/S2 without murmurs, rubs or gallops. Abdomen: Soft, non-tender, non-distended with normal bowel sounds. Musculoskeletal: No clubbing, cyanosis or edema bilaterally. Full range of motion without deformity. Skin: Skin color, texture, turgor normal.  No rashes or lesions. Neurologic:  Neurovascularly intact without any focal sensory/motor deficits. Cranial nerves: II-XII intact, grossly non-focal.  Psychiatric: Alert and oriented, thought content appropriate, normal insight  Capillary Refill: Brisk,< 3 seconds   Peripheral Pulses: +2 palpable, equal bilaterally       Labs:   Recent Labs     03/30/19  1600 03/31/19  0653   WBC 31.4* 23.5*   HGB 12.0 11.4*   HCT 36.0 34.0*    178     Recent Labs     03/30/19  1600 03/31/19  0653   * 128*   K 3.8 3.3*   CL 92* 100   CO2 19* 18*   BUN 36* 29*   CREATININE 1.2 0.9   CALCIUM 9.0 8.4     Recent Labs     03/30/19  1600   AST 17   ALT 13   BILITOT 1.0   ALKPHOS 238*     No results for input(s): INR in the last 72 hours. No results for input(s): Floreen Pill in the last 72 hours. Urinalysis:    No results found for: Magalene Edward, BACTERIA, RBCUA, BLOODU, SPECGRAV, Sindi [place] [address]94    Radiology:  XR CHEST STANDARD (2 VW)   Final Result   1. Probable right upper lobe pneumonia   2.  Linear infiltrates in the left lower lobe most likely represents scarring   from the previous noted left lower lobe pneumonia                 Assessment/Plan:    Active Hospital Problems    Diagnosis Date Noted    CAP (community acquired pneumonia) [J18.9] 03/30/2019    PNA (pneumonia) [J18.9] 03/30/2019    History of

## 2019-03-31 NOTE — PROGRESS NOTES
RESPIRATORY THERAPY ASSESSMENT    Name:  Jeri Briceño Record Number:  [id number]  Age: 75 y.o. Gender: female  : 1945  Today's Date:  3/31/2019  Room:  [address] [place] [address]-    Assessment     Is the patient being admitted for a COPD or Asthma exacerbation? No   (If yes the patient will be seen every 4 hours for the first 24 hours and then reassessed)    Patient Admission Diagnosis      Allergies  No Known Allergies    Minimum Predicted Vital Capacity:     780          Actual Vital Capacity:      500              Pulmonary History:No history  Home Oxygen Therapy:  room air  Home Respiratory Therapy:Albuterol   Current Respiratory Therapy:  HHN Duoneb Q4WA, MDI Albuterol PRN  Treatment Type: HHN, IS, Vibratory Mucous Clearing Therapy or Intervention  Medications: Albuterol/Ipratropium    Respiratory Severity Index(RSI)   Patients with orders for inhalation medications, oxygen, or any therapeutic treatment modality will be placed on Respiratory Protocol. They will be assessed with the first treatment and at least every 72 hours thereafter. The following severity scale will be used to determine frequency of treatment intervention. Smoking History: Pulmonary Disease or Smoking History, Greater than 15 pack year = 2    Social History  Social History     Tobacco Use    Smoking status: Former Smoker     Packs/day: 0.25     Last attempt to quit: 2000     Years since quittin.2    Smokeless tobacco: Never Used   Substance Use Topics    Alcohol use:  Yes     Alcohol/week: 0.6 oz     Types: 1 Glasses of wine per week     Comment: occasionally    Drug use: Never       Recent Surgical History: None = 0  Past Surgical History  Past Surgical History:   Procedure Laterality Date    HYSTERECTOMY         Level of Consciousness: Alert, Oriented, and Cooperative = 0    Level of Activity: Walking unassisted = 0    Respiratory Pattern: Dyspnea with exertion;Irregular pattern;or RR less than 6 = 2    Breath hold  4. Bronchodilators will be administered via Hand Held Nebulizer ANJUM [facility]) to patients when ANY of the following criteria are met  a. Incognizant or uncooperative          b. Patients treated with HHN at Home        c. Unable to demonstrate proper use of MDI with spacer     d. RR > 30 bpm   5. Bronchodilators will be delivered via Metered Dose Inhaler (MDI), HHN, Aerogen to intubated patients on mechanical ventilation. 6. Inhalation medication orders will be delivered and/or substituted as outlined below. Aerosolized Medications Ordering and Administration Guidelines:    1. All Medications will be ordered by a physician, and their frequency and/or modality will be adjusted as defined by the patients Respiratory Severity Index (RSI) score. 2. If the patient does not have documented COPD, consider discontinuing anticholinergics when RSI is less than 9.  3. If the bronchospasm worsens (increased RSI), then the bronchodilator frequency can be increased to a maximum of every 4 hours. If greater than every 4 hours is required, the physician will be contacted. 4. If the bronchospasm improves, the frequency of the bronchodilator can be decreased, based on the patient's RSI, but not less than home treatment regimen frequency. 5. Bronchodilator(s) will be discontinued if patient has a RSI less than 9 and has received no scheduled or as needed treatment for 72  Hrs. Patients Ordered on a Mucolytic Agent:    1. Must always be administered with a bronchodilator. 2. Discontinue if patient experiences worsened bronchospasm, or secretions have lessened to the point that the patient is able to clear them with a cough. Anti-inflammatory and Combination Medications:    1. If the patient lacks prior history of lung disease, is not using inhaled anti-inflammatory medication at home, and lacks wheezing by examination or by history for at least 24 hours, contact physician for possible discontinuation.

## 2019-04-01 LAB
ANION GAP SERPL CALCULATED.3IONS-SCNC: 10 MMOL/L (ref 3–16)
BASOPHILS ABSOLUTE: 0 K/UL (ref 0–0.2)
BASOPHILS RELATIVE PERCENT: 0.2 %
BUN BLDV-MCNC: 15 MG/DL (ref 7–20)
CALCIUM SERPL-MCNC: 8.7 MG/DL (ref 8.3–10.6)
CHLORIDE BLD-SCNC: 104 MMOL/L (ref 99–110)
CO2: 18 MMOL/L (ref 21–32)
CREAT SERPL-MCNC: 0.7 MG/DL (ref 0.6–1.2)
EOSINOPHILS ABSOLUTE: 0.3 K/UL (ref 0–0.6)
EOSINOPHILS RELATIVE PERCENT: 2.2 %
GFR AFRICAN AMERICAN: >60
GFR NON-AFRICAN AMERICAN: >60
GLUCOSE BLD-MCNC: 160 MG/DL (ref 70–99)
HCT VFR BLD CALC: 33.4 % (ref 36–48)
HEMOGLOBIN: 11.1 G/DL (ref 12–16)
LYMPHOCYTES ABSOLUTE: 1.4 K/UL (ref 1–5.1)
LYMPHOCYTES RELATIVE PERCENT: 9.5 %
MCH RBC QN AUTO: 29.7 PG (ref 26–34)
MCHC RBC AUTO-ENTMCNC: 33.3 G/DL (ref 31–36)
MCV RBC AUTO: 89.2 FL (ref 80–100)
MONOCYTES ABSOLUTE: 1.1 K/UL (ref 0–1.3)
MONOCYTES RELATIVE PERCENT: 7.5 %
NEUTROPHILS ABSOLUTE: 11.6 K/UL (ref 1.7–7.7)
NEUTROPHILS RELATIVE PERCENT: 80.6 %
PDW BLD-RTO: 14.1 % (ref 12.4–15.4)
PLATELET # BLD: 185 K/UL (ref 135–450)
PMV BLD AUTO: 8 FL (ref 5–10.5)
POTASSIUM SERPL-SCNC: 4.1 MMOL/L (ref 3.5–5.1)
RBC # BLD: 3.75 M/UL (ref 4–5.2)
SODIUM BLD-SCNC: 132 MMOL/L (ref 136–145)
WBC # BLD: 14.3 K/UL (ref 4–11)

## 2019-04-01 PROCEDURE — 6360000002 HC RX W HCPCS: Performed by: INTERNAL MEDICINE

## 2019-04-01 PROCEDURE — 6370000000 HC RX 637 (ALT 250 FOR IP): Performed by: INTERNAL MEDICINE

## 2019-04-01 PROCEDURE — 36415 COLL VENOUS BLD VENIPUNCTURE: CPT

## 2019-04-01 PROCEDURE — 94640 AIRWAY INHALATION TREATMENT: CPT

## 2019-04-01 PROCEDURE — 85025 COMPLETE CBC W/AUTO DIFF WBC: CPT

## 2019-04-01 PROCEDURE — 92610 EVALUATE SWALLOWING FUNCTION: CPT

## 2019-04-01 PROCEDURE — 2500000003 HC RX 250 WO HCPCS: Performed by: INTERNAL MEDICINE

## 2019-04-01 PROCEDURE — 2580000003 HC RX 258: Performed by: REGISTERED NURSE

## 2019-04-01 PROCEDURE — 2580000003 HC RX 258: Performed by: INTERNAL MEDICINE

## 2019-04-01 PROCEDURE — 80048 BASIC METABOLIC PNL TOTAL CA: CPT

## 2019-04-01 PROCEDURE — 1200000000 HC SEMI PRIVATE

## 2019-04-01 PROCEDURE — 94668 MNPJ CHEST WALL SBSQ: CPT

## 2019-04-01 RX ORDER — SODIUM CHLORIDE 9 MG/ML
INJECTION, SOLUTION INTRAVENOUS CONTINUOUS
Status: DISPENSED | OUTPATIENT
Start: 2019-04-01 | End: 2019-04-01

## 2019-04-01 RX ADMIN — GUAIFENESIN AND DEXTROMETHORPHAN 10 ML: 100; 10 SYRUP ORAL at 07:31

## 2019-04-01 RX ADMIN — IPRATROPIUM BROMIDE AND ALBUTEROL SULFATE 1 AMPULE: .5; 3 SOLUTION RESPIRATORY (INHALATION) at 11:58

## 2019-04-01 RX ADMIN — LEVOTHYROXINE SODIUM 50 MCG: 50 TABLET ORAL at 07:31

## 2019-04-01 RX ADMIN — ENOXAPARIN SODIUM 40 MG: 40 INJECTION SUBCUTANEOUS at 09:56

## 2019-04-01 RX ADMIN — ACETAMINOPHEN 650 MG: 325 TABLET ORAL at 07:31

## 2019-04-01 RX ADMIN — CEFTRIAXONE SODIUM 1 G: 1 INJECTION, POWDER, FOR SOLUTION INTRAMUSCULAR; INTRAVENOUS at 18:35

## 2019-04-01 RX ADMIN — SODIUM CHLORIDE: 9 INJECTION, SOLUTION INTRAVENOUS at 11:10

## 2019-04-01 RX ADMIN — IPRATROPIUM BROMIDE AND ALBUTEROL SULFATE 1 AMPULE: .5; 3 SOLUTION RESPIRATORY (INHALATION) at 19:09

## 2019-04-01 RX ADMIN — ACETAMINOPHEN 650 MG: 325 TABLET ORAL at 01:32

## 2019-04-01 RX ADMIN — IPRATROPIUM BROMIDE AND ALBUTEROL SULFATE 1 AMPULE: .5; 3 SOLUTION RESPIRATORY (INHALATION) at 15:11

## 2019-04-01 RX ADMIN — ACETAMINOPHEN 650 MG: 325 TABLET ORAL at 17:41

## 2019-04-01 RX ADMIN — GUAIFENESIN AND DEXTROMETHORPHAN 10 ML: 100; 10 SYRUP ORAL at 22:55

## 2019-04-01 RX ADMIN — DOXYCYCLINE 100 MG: 100 INJECTION, POWDER, LYOPHILIZED, FOR SOLUTION INTRAVENOUS at 07:32

## 2019-04-01 RX ADMIN — DOXYCYCLINE 100 MG: 100 INJECTION, POWDER, LYOPHILIZED, FOR SOLUTION INTRAVENOUS at 21:28

## 2019-04-01 RX ADMIN — IPRATROPIUM BROMIDE AND ALBUTEROL SULFATE 1 AMPULE: .5; 3 SOLUTION RESPIRATORY (INHALATION) at 08:09

## 2019-04-01 ASSESSMENT — PAIN SCALES - GENERAL
PAINLEVEL_OUTOF10: 0
PAINLEVEL_OUTOF10: 4
PAINLEVEL_OUTOF10: 2
PAINLEVEL_OUTOF10: 0
PAINLEVEL_OUTOF10: 1
PAINLEVEL_OUTOF10: 0
PAINLEVEL_OUTOF10: 3
PAINLEVEL_OUTOF10: 0
PAINLEVEL_OUTOF10: 0

## 2019-04-01 NOTE — PROGRESS NOTES
Speech Language Pathology      Name: Breanne Last  : 1945  Medical Diagnosis: PNA (pneumonia) [J18.9]  PNA (pneumonia) [J18.9]    Swallow screen completed. Patient demonstrates some risk indicators for potential dysphagia / aspiration per swallow screen. RECOMMEND: Clinical Swallow Evaluation at bedside to assess swallowing function, rule out aspiration, and determine appropriate diet level. SLP to perfect serve MD to request BSE. Christa GALLARDO CCC-SLP  Speech-Language Pathologist  [phone]

## 2019-04-01 NOTE — CARE COORDINATION
CASE MANAGEMENT INITIAL ASSESSMENT      Reviewed chart and met with patient today, re: Possible discharge needs. Explained Case Management role/services. yes    Family present: yes  Primary contact information: Daughter Joyce Jeans [phone]    Admit date/status: 3/30/19 IP  Diagnosis: PNA    Insurance: Medicare  Precert required for SNF - N        3 night stay required - Y    Living arrangements, Adls, care needs, prior to admission:  Lives in a one story house independent drives and has two jobs. Transportation: pt drives    [address] [email or web address] at home: none    Services in the home and/or outpatient, prior to admission: none    Dialysis Facility (if applicable) N/A  · Name:  · Address:  · Dialysis Schedule:  · Phone:  · Fax:    PT/OT recs: none seen    Hospital Exemption Notification (HEN): needed for snf, not initiated    Barriers to discharge: none    Plan/comments: pt is independent and drives. Plans on discharging home with no needs from case management. However she is agreeable for home care if recommended but not in agreement for snf. No needs from case management identified at this time. Please notify if any needs should arise.      [facility] on chart for MD signature

## 2019-04-01 NOTE — PROGRESS NOTES
Hospitalist Progress Note      PCP: Andreea Mullen MD    Date of Admission: 3/30/2019    Chief Complaint: SOB, rib pain    Hospital Course:    75 y.o. female who presented to [facility] with complaints of right-sided chest pain, cough, shortness of breath, fever that started about 5 days back. On Tuesday night she noticed worsening shortness of breath and cough associated with pleuritic right-sided chest pain, producing minimal amount of sputum, no hemoptysis. Also reported subjective fevers since yesterday but no chills or rigors. Patient reports she has had pneumonia in the past 3 or 4 times but none requiring hospitalization. She is a prior smoker who quit about 20 years back. Denies any alcohol use. Denies sick contacts or recent travel. Denies any abdominal pain, nausea, vomiting or diarrhea. Subjective:  Pt is on RA. Afebrile. Feeling better. VSS. Pt without complaints currently. Medications:  Reviewed    Infusion Medications    sodium chloride 75 mL/hr at 04/01/19 1110     Scheduled Medications    ipratropium-albuterol  1 ampule Inhalation Q4H WA    levothyroxine  50 mcg Oral Daily    sodium chloride flush  10 mL Intravenous 2 times per day    enoxaparin  40 mg Subcutaneous Daily    cefTRIAXone (ROCEPHIN) IV  1 g Intravenous Q24H    And    doxycycline (VIBRAMYCIN) IV  100 mg Intravenous Q12H     PRN Meds: albuterol sulfate HFA, guaiFENesin-dextromethorphan, sodium chloride flush, magnesium hydroxide, ondansetron, acetaminophen      Intake/Output Summary (Last 24 hours) at 4/1/2019 1331  Last data filed at 4/1/2019 0732  Gross per 24 hour   Intake 2248.75 ml   Output 950 ml   Net 1298.75 ml       Physical Exam Performed:    /63   Pulse 88   Temp 98 °F (36.7 °C) (Oral)   Resp 20   Ht 5' 3\" (1.6 m)   Wt 109 lb 12.8 oz (49.8 kg)   SpO2 98%   BMI 19.45 kg/m²     General appearance: Pleasant, elderly female in no apparent distress, appears stated age and cooperative.   HEENT: Pupils equal, round, and reactive to light. Conjunctivae/corneas clear. Neck: Supple, with full range of motion. No jugular venous distention. Trachea midline. Respiratory:  Normal respiratory effort. Clear to auscultation, bilaterally without Rales/Wheezes/Rhonchi. Cardiovascular: Regular rate and rhythm with normal S1/S2 without murmurs, rubs or gallops. Abdomen: Soft, non-tender, non-distended with normal bowel sounds. Musculoskeletal: No clubbing, cyanosis or edema bilaterally. Full range of motion without deformity. Skin: Skin color, texture, turgor normal.  No rashes or lesions. Neurologic:  Neurovascularly intact without any focal sensory/motor deficits. Cranial nerves: II-XII intact, grossly non-focal.  Psychiatric: Alert and oriented, thought content appropriate, normal insight  Capillary Refill: Brisk,< 3 seconds   Peripheral Pulses: +2 palpable, equal bilaterally       Labs:   Recent Labs     03/30/19  1600 03/31/19  0653 04/01/19  0803   WBC 31.4* 23.5* 14.3*   HGB 12.0 11.4* 11.1*   HCT 36.0 34.0* 33.4*    178 185     Recent Labs     03/30/19  1600 03/31/19  0653 04/01/19  0803   * 128* 132*   K 3.8 3.3* 4.1   CL 92* 100 104   CO2 19* 18* 18*   BUN 36* 29* 15   CREATININE 1.2 0.9 0.7   CALCIUM 9.0 8.4 8.7     Recent Labs     03/30/19  1600   AST 17   ALT 13   BILITOT 1.0   ALKPHOS 238*       Radiology:  XR CHEST STANDARD (2 VW)   Final Result   1. Probable right upper lobe pneumonia   2.  Linear infiltrates in the left lower lobe most likely represents scarring   from the previous noted left lower lobe pneumonia             Assessment/Plan:    Active Hospital Problems    Diagnosis Date Noted    CAP (community acquired pneumonia) [J18.9] 03/30/2019    PNA (pneumonia) [J18.9] 03/30/2019    History of tobacco abuse [Z87.891] 03/30/2019    Dehydration [E86.0] 03/30/2019    Sepsis ([facility] [address] [A41.9] 03/30/2019       Sepsis due to community acquired pneumonia:   - Patient presented with dyspnea, cough, fever, tachycardia, leukocytosis with chest imaging suggesting LLL pneumonia. Lactic < 2.   - Rapid flu is negative. Legionella and pneumococcal urine antigens are negative. Blood cultures are NGTD. Sputum culture shows light growth NRF and GNR -- ID and sensitivities are pending.   - Continue Rocephin and Doxycycline (s 3/30). - Continue inhaled bronchodilator therapy. Pulmonary toilet with IS and acapella.      Dehydration:  - Evident by hyponatremia/hypochloremia and clinically dehydrated. Improving with IV fluids.  Monitor BMP closely.      Hypothyroidism:  - Continue Synthroid, TSH normal in March 2019.       DVT Prophylaxis: Lovenox  Diet: DIET GENERAL;  Code Status: Full Code    PT/OT Eval Status: not indicated - patient ambulatory - still works full-time    Dispo - likely tomorrow     [address], [place] - [address] [facility]

## 2019-04-01 NOTE — PLAN OF CARE
Problem: Falls - Risk of:  Goal: Will remain free from falls  Description  Bed in lowest locked positions, call light within reach, side rails up x 2, bed check in place. Instructed patient to call before getting out of bed. Patient verbalized understanding. Note:   Pt has remained free of falls with fall precautions in place.

## 2019-04-01 NOTE — PROGRESS NOTES
Speech Language Pathology  Facility/Department: [facility] A2 CARD TELEMETRY   BEDSIDE SWALLOW EVALUATION    NAME: Eliud Miranda  : 1945  MRN: [id number]    ADMISSION DATE: 3/30/2019  ADMITTING DIAGNOSIS: has Pneumonia; CAP (community acquired pneumonia); PNA (pneumonia); History of tobacco abuse; Dehydration; and Sepsis ([address] on their problem list.     ONSET DATE: 3/30/19    Recent Chest Xray/CT of Chest: 3/30/19  Impression   1. Probable right upper lobe pneumonia   2. Linear infiltrates in the left lower lobe most likely represents scarring   from the previous noted left lower lobe pneumonia     Date of Eval: 2019  Evaluating Therapist: Mil Chowdhury    Current Diet level:  Current Diet : Regular  Current Liquid Diet : Thin    Primary Complaint  Patient Complaint: None     Pain:  Pain Assessment  Patient Currently in Pain: Denies    Reason for Referral  Eliud Miranda was referred for a bedside swallow evaluation to assess the efficiency of her swallow function, identify signs and symptoms of aspiration and make recommendations regarding safe dietary consistencies, effective compensatory strategies, and safe eating environment. Impression  Dysphagia Diagnosis: Mild oral stage dysphagia  Dysphagia Outcome Severity Scale: Level 6: Within functional limits/Modified independence     Dysphagia Impression : Pt seen this date for BSE. RN ok'd entry of SLP. Pt admitted with SOB and pneumonia. Pt has a PMHx significant of pneumonia. Pt was alert, seated upright in chair. Pt had dentures in bathroom and SLP assisted with cleaning. Pt denied any prior dysphagia. Oral motor exam was WNL. Mildly reduced laryngeal elevation upon palpation. Pt able to produce dry swallow and cough on command. Pt was given trials of thin liquids via cup, bites of puree, and minimal bites of hard solids (2 small bites). Pt tolerating all trials without overt clinical s/s of aspiration.  Pt had mild difficulty with mastication of solids d/t dentures not fitting properly, however, pt able to clear oral cavity. Clear vocal quality following all trials. RECOMMEND CONTINUE A REGULAR DIET WITH THIN LIQUIDS. SLP to follow-up x 1 session for diet tolerance monitoring. Treatment Plan  Requires SLP Intervention: Yes  Duration/Frequency of Treatment: 1 more session   D/C Recommendations: To be determined       Recommended Diet and Intervention  Diet Solids Recommendation: Regular  Liquid Consistency Recommendation: Thin  Recommended Form of Meds: PO  Therapeutic Interventions: Diet tolerance monitoring;Patient/Family education    Compensatory Swallowing Strategies  Compensatory Swallowing Strategies  · Alternate solids and liquids  · Small bites/sips  · Remain upright for 30-45 minutes after meals  · Upright as possible for all oral intake  · Eat/Feed slowly    Treatment/Goals  Short-term Goals  Timeframe for Short-term Goals: 1 more session   Long-term Goals  Timeframe for Long-term Goals: 1 more session   Dysphagia Goals: The patient will tolerate recommended diet without observed clinical signs of aspiration; The patient/caregiver will demonstrate understanding of compensatory strategies for improved swallowing safety. ;The patient will tolerate thin liquids without signs and symptoms of aspiration 10/10 via cup. ;The patient will tolerate regular consistency solids 10/10. General  Chart Reviewed: Yes  Comments: \"My dentures don't fit well\"  Subjective  Subjective: Pt awake and alert, seated upright in bed. Behavior/Cognition: Alert; Cooperative;Pleasant mood  Respiratory Status: Room air  Breath Sounds: Clear  O2 Device: None (Room air)  Communication Observation: Functional  Follows Directions: Simple  Dentition: Dentures top;Dentures bottom  Patient Positioning: Upright in chair  Baseline Vocal Quality: Normal  Volitional Cough: Strong  Prior Dysphagia History: None per chart or per pt report  Consistencies Administered: Reg solid;Puree; Thin - cup       Vision/Hearing  Hearing  Hearing: Within functional limits    Oral Motor Deficits  Oral/Motor  Oral Motor: Within functional limits    Oral Phase Dysfunction  Oral Phase  Oral Phase: Exceptions  Oral Phase Dysfunction  Impaired Mastication: Reg Solid  Oral Phase  Oral Phase - Comment: Oral phase of swallowing mildly impaired d/t dentures not properly fitting. Pt able to masticate small bites of hard solids without difficulty. Indicators of Pharyngeal Phase Dysfunction   Pharyngeal Phase  Pharyngeal Phase: WFL  Pharyngeal Phase   Pharyngeal: Pharyngeal phase of swallowing appears to be [employer]. Prognosis  Prognosis  Prognosis for safe diet advancement: good  Individuals consulted  Consulted and agree with results and recommendations: Patient; Family member    Education  Patient Education: Pt educated on role of SLP, diet recommendation, safe swallow strategies, and s/s of aspiration   Patient Education Response: Verbalizes understanding    Therapy Time  SLP Individual Minutes  Time In: 1024  Time Out: 4529  Minutes: [address], SLP  4/1/2019 11:06 AM

## 2019-04-01 NOTE — PLAN OF CARE
Patient remains free of falls this shift. Non skid footwear on when out of bed. Bed locked and in low position. Call light within reach.

## 2019-04-02 LAB
ANION GAP SERPL CALCULATED.3IONS-SCNC: 10 MMOL/L (ref 3–16)
BUN BLDV-MCNC: 9 MG/DL (ref 7–20)
CALCIUM SERPL-MCNC: 8.9 MG/DL (ref 8.3–10.6)
CHLORIDE BLD-SCNC: 105 MMOL/L (ref 99–110)
CO2: 20 MMOL/L (ref 21–32)
CREAT SERPL-MCNC: 0.6 MG/DL (ref 0.6–1.2)
GFR AFRICAN AMERICAN: >60
GFR NON-AFRICAN AMERICAN: >60
GLUCOSE BLD-MCNC: 144 MG/DL (ref 70–99)
HCT VFR BLD CALC: 33.1 % (ref 36–48)
HEMOGLOBIN: 11.1 G/DL (ref 12–16)
MCH RBC QN AUTO: 29.6 PG (ref 26–34)
MCHC RBC AUTO-ENTMCNC: 33.4 G/DL (ref 31–36)
MCV RBC AUTO: 88.5 FL (ref 80–100)
PDW BLD-RTO: 14.3 % (ref 12.4–15.4)
PLATELET # BLD: 223 K/UL (ref 135–450)
PMV BLD AUTO: 8 FL (ref 5–10.5)
POTASSIUM REFLEX MAGNESIUM: 3.7 MMOL/L (ref 3.5–5.1)
RBC # BLD: 3.74 M/UL (ref 4–5.2)
SODIUM BLD-SCNC: 135 MMOL/L (ref 136–145)
WBC # BLD: 11.1 K/UL (ref 4–11)

## 2019-04-02 PROCEDURE — 94150 VITAL CAPACITY TEST: CPT

## 2019-04-02 PROCEDURE — 6370000000 HC RX 637 (ALT 250 FOR IP): Performed by: INTERNAL MEDICINE

## 2019-04-02 PROCEDURE — 85027 COMPLETE CBC AUTOMATED: CPT

## 2019-04-02 PROCEDURE — 6360000002 HC RX W HCPCS: Performed by: INTERNAL MEDICINE

## 2019-04-02 PROCEDURE — 80048 BASIC METABOLIC PNL TOTAL CA: CPT

## 2019-04-02 PROCEDURE — 94640 AIRWAY INHALATION TREATMENT: CPT

## 2019-04-02 PROCEDURE — 94668 MNPJ CHEST WALL SBSQ: CPT

## 2019-04-02 PROCEDURE — 1200000000 HC SEMI PRIVATE

## 2019-04-02 PROCEDURE — 36415 COLL VENOUS BLD VENIPUNCTURE: CPT

## 2019-04-02 PROCEDURE — 2580000003 HC RX 258: Performed by: INTERNAL MEDICINE

## 2019-04-02 PROCEDURE — 92526 ORAL FUNCTION THERAPY: CPT

## 2019-04-02 PROCEDURE — 2500000003 HC RX 250 WO HCPCS: Performed by: INTERNAL MEDICINE

## 2019-04-02 PROCEDURE — 6370000000 HC RX 637 (ALT 250 FOR IP): Performed by: NURSE PRACTITIONER

## 2019-04-02 RX ORDER — ACETAMINOPHEN 325 MG/1
650 TABLET ORAL EVERY 4 HOURS PRN
Status: DISCONTINUED | OUTPATIENT
Start: 2019-04-02 | End: 2019-04-04 | Stop reason: HOSPADM

## 2019-04-02 RX ORDER — SODIUM CHLORIDE 9 MG/ML
INJECTION, SOLUTION INTRAVENOUS
Status: DISPENSED
Start: 2019-04-02 | End: 2019-04-03

## 2019-04-02 RX ADMIN — CEFTRIAXONE SODIUM 1 G: 1 INJECTION, POWDER, FOR SOLUTION INTRAMUSCULAR; INTRAVENOUS at 16:38

## 2019-04-02 RX ADMIN — IPRATROPIUM BROMIDE AND ALBUTEROL SULFATE 1 AMPULE: .5; 3 SOLUTION RESPIRATORY (INHALATION) at 11:55

## 2019-04-02 RX ADMIN — IPRATROPIUM BROMIDE AND ALBUTEROL SULFATE 1 AMPULE: .5; 3 SOLUTION RESPIRATORY (INHALATION) at 15:22

## 2019-04-02 RX ADMIN — GUAIFENESIN AND DEXTROMETHORPHAN 10 ML: 100; 10 SYRUP ORAL at 20:14

## 2019-04-02 RX ADMIN — IPRATROPIUM BROMIDE AND ALBUTEROL SULFATE 1 AMPULE: .5; 3 SOLUTION RESPIRATORY (INHALATION) at 08:54

## 2019-04-02 RX ADMIN — IPRATROPIUM BROMIDE AND ALBUTEROL SULFATE 1 AMPULE: .5; 3 SOLUTION RESPIRATORY (INHALATION) at 19:19

## 2019-04-02 RX ADMIN — Medication 10 ML: at 19:57

## 2019-04-02 RX ADMIN — DOXYCYCLINE 100 MG: 100 INJECTION, POWDER, LYOPHILIZED, FOR SOLUTION INTRAVENOUS at 06:07

## 2019-04-02 RX ADMIN — ENOXAPARIN SODIUM 40 MG: 40 INJECTION SUBCUTANEOUS at 08:32

## 2019-04-02 RX ADMIN — LEVOTHYROXINE SODIUM 50 MCG: 50 TABLET ORAL at 06:10

## 2019-04-02 RX ADMIN — DOXYCYCLINE 100 MG: 100 INJECTION, POWDER, LYOPHILIZED, FOR SOLUTION INTRAVENOUS at 18:23

## 2019-04-02 RX ADMIN — Medication 10 ML: at 08:32

## 2019-04-02 RX ADMIN — ACETAMINOPHEN 650 MG: 325 TABLET ORAL at 20:14

## 2019-04-02 ASSESSMENT — PAIN DESCRIPTION - ORIENTATION: ORIENTATION: LOWER

## 2019-04-02 ASSESSMENT — PAIN DESCRIPTION - PAIN TYPE: TYPE: ACUTE PAIN

## 2019-04-02 ASSESSMENT — PAIN DESCRIPTION - LOCATION: LOCATION: BACK

## 2019-04-02 ASSESSMENT — PAIN SCALES - GENERAL: PAINLEVEL_OUTOF10: 3

## 2019-04-02 ASSESSMENT — PAIN DESCRIPTION - DESCRIPTORS: DESCRIPTORS: SORE

## 2019-04-02 NOTE — PROGRESS NOTES
RESPIRATORY THERAPY ASSESSMENT    Name:  Jeri Briceño Record Number:  [id number]  Age: 75 y.o. Gender: female  : 1945  Today's Date:  2019  Room:  [address] [place] [address]-    Assessment     Is the patient being admitted for a COPD or Asthma exacerbation? No   (If yes the patient will be seen every 4 hours for the first 24 hours and then reassessed)    Patient Admission Diagnosis      Allergies  No Known Allergies    Minimum Predicted Vital Capacity:     780          Actual Vital Capacity:      500              Pulmonary History:No history  Home Oxygen Therapy:  room air  Home Respiratory Therapy:Albuterol   Current Respiratory Therapy:  Duoneb Q4WA, Albuterol mdi q4prn  Treatment Type: HHN, Vibratory Mucous Clearing Therapy or Intervention, IS  Medications: Albuterol/Ipratropium    Respiratory Severity Index(RSI)   Patients with orders for inhalation medications, oxygen, or any therapeutic treatment modality will be placed on Respiratory Protocol. They will be assessed with the first treatment and at least every 72 hours thereafter. The following severity scale will be used to determine frequency of treatment intervention. Smoking History: Pulmonary Disease or Smoking History, Greater than 15 pack year = 2    Social History  Social History     Tobacco Use    Smoking status: Former Smoker     Packs/day: 0.25     Last attempt to quit: 2000     Years since quittin.2    Smokeless tobacco: Never Used   Substance Use Topics    Alcohol use:  Yes     Alcohol/week: 0.6 oz     Types: 1 Glasses of wine per week     Comment: occasionally    Drug use: Never       Recent Surgical History: None = 0  Past Surgical History  Past Surgical History:   Procedure Laterality Date    HYSTERECTOMY         Level of Consciousness: Alert, Oriented, and Cooperative = 0    Level of Activity: Walking unassisted = 0    Respiratory Pattern: Regular Pattern; RR 8-20 = 0    Breath Sounds: Diminshed bilaterally and/or criteria are met  a. Incognizant or uncooperative          b. Patients treated with HHN at Home        c. Unable to demonstrate proper use of MDI with spacer     d. RR > 30 bpm   5. Bronchodilators will be delivered via Metered Dose Inhaler (MDI), HHN, Aerogen to intubated patients on mechanical ventilation. 6. Inhalation medication orders will be delivered and/or substituted as outlined below. Aerosolized Medications Ordering and Administration Guidelines:    1. All Medications will be ordered by a physician, and their frequency and/or modality will be adjusted as defined by the patients Respiratory Severity Index (RSI) score. 2. If the patient does not have documented COPD, consider discontinuing anticholinergics when RSI is less than 9.  3. If the bronchospasm worsens (increased RSI), then the bronchodilator frequency can be increased to a maximum of every 4 hours. If greater than every 4 hours is required, the physician will be contacted. 4. If the bronchospasm improves, the frequency of the bronchodilator can be decreased, based on the patient's RSI, but not less than home treatment regimen frequency. 5. Bronchodilator(s) will be discontinued if patient has a RSI less than 9 and has received no scheduled or as needed treatment for 72  Hrs. Patients Ordered on a Mucolytic Agent:    1. Must always be administered with a bronchodilator. 2. Discontinue if patient experiences worsened bronchospasm, or secretions have lessened to the point that the patient is able to clear them with a cough. Anti-inflammatory and Combination Medications:    1. If the patient lacks prior history of lung disease, is not using inhaled anti-inflammatory medication at home, and lacks wheezing by examination or by history for at least 24 hours, contact physician for possible discontinuation.

## 2019-04-02 NOTE — PROGRESS NOTES
Paged Mariya Harley, CNP, \"Pt has slight back pain 3/10, can Tylenol be modified to be given for pain? Thanks\"    2008- Received order to modify Tylenol order to include PRN for pain rating 1-3.     Chhaya Mullen RN

## 2019-04-02 NOTE — PROGRESS NOTES
Conjunctivae/corneas clear. Neck: Supple, with full range of motion. No jugular venous distention. Trachea midline. Respiratory:  Normal respiratory effort. Clear to auscultation, bilaterally without Rales/Wheezes/Rhonchi. Cardiovascular: Regular rate and rhythm with normal S1/S2 without murmurs, rubs or gallops. Abdomen: Soft, non-tender, non-distended with normal bowel sounds. Musculoskeletal: No clubbing, cyanosis or edema bilaterally. Full range of motion without deformity. Skin: Skin color, texture, turgor normal.  No rashes or lesions. Neurologic:  Neurovascularly intact without any focal sensory/motor deficits. Cranial nerves: II-XII intact, grossly non-focal.  Psychiatric: Alert and oriented, thought content appropriate, normal insight  Capillary Refill: Brisk,< 3 seconds   Peripheral Pulses: +2 palpable, equal bilaterally       Labs:   Recent Labs     03/31/19  0653 04/01/19  0803 04/02/19  0724   WBC 23.5* 14.3* 11.1*   HGB 11.4* 11.1* 11.1*   HCT 34.0* 33.4* 33.1*    185 223     Recent Labs     03/31/19  0653 04/01/19  0803 04/02/19  0724   * 132* 135*   K 3.3* 4.1 3.7    104 105   CO2 18* 18* 20*   BUN 29* 15 9   CREATININE 0.9 0.7 0.6   CALCIUM 8.4 8.7 8.9     Recent Labs     03/30/19  1600   AST 17   ALT 13   BILITOT 1.0   ALKPHOS 238*       Radiology:  XR CHEST STANDARD (2 VW)   Final Result   1. Probable right upper lobe pneumonia   2.  Linear infiltrates in the left lower lobe most likely represents scarring   from the previous noted left lower lobe pneumonia             Assessment/Plan:    Active Hospital Problems    Diagnosis Date Noted    CAP (community acquired pneumonia) [J18.9] 03/30/2019    PNA (pneumonia) [J18.9] 03/30/2019    History of tobacco abuse [Z87.891] 03/30/2019    Dehydration [E86.0] 03/30/2019    Sepsis ([address] [A41.9] 03/30/2019       Sepsis due to community acquired pneumonia (clinically improving):   - Patient presented with dyspnea, cough, fever, tachycardia, leukocytosis with chest imaging suggesting LLL pneumonia. Lactic < 2.   - Rapid flu is negative. Legionella and pneumococcal urine antigens are negative. Blood cultures are NGTD. Sputum culture shows light growth NRF and GNR -- ID and sensitivities are pending.   - Continue Rocephin and Doxycycline (s 3/30). - Continue inhaled bronchodilator therapy. Pulmonary toilet with IS and acapella.      Dehydration (resolved):  - Evident by hyponatremia/hypochloremia and clinically dehydrated. Improving with IV fluids.  Monitor BMP closely.      Hypothyroidism:  - Continue Synthroid, TSH normal in March 2019.       DVT Prophylaxis: Lovenox  Diet: DIET GENERAL; No Drinking Straw  Code Status: Full Code    PT/OT Eval Status: not indicated - patient ambulatory - still works full-time    Dispo - likely tomorrow     NICK Montanez - [facility]

## 2019-04-02 NOTE — PLAN OF CARE
Taught upon need to use the call light for assistance if needed, bed in lowest position, wheels locked, call light is within reach

## 2019-04-02 NOTE — PROGRESS NOTES
Patient is awake, alert and oriented x4. Assessment is complete, see flow sheet. Bed in lowest position, wheels locked, call light is within reach.  Patient denies any further needs at the moment, continuing to monitor, Toney Standard, RN

## 2019-04-02 NOTE — PROGRESS NOTES
Bedside report given to [facility]. Call light and bedside table within reach. No needs voiced at this time. Bed in lowest position, brakes locked.

## 2019-04-03 PROCEDURE — 6370000000 HC RX 637 (ALT 250 FOR IP): Performed by: INTERNAL MEDICINE

## 2019-04-03 PROCEDURE — 6370000000 HC RX 637 (ALT 250 FOR IP): Performed by: REGISTERED NURSE

## 2019-04-03 PROCEDURE — 2500000003 HC RX 250 WO HCPCS: Performed by: INTERNAL MEDICINE

## 2019-04-03 PROCEDURE — 94668 MNPJ CHEST WALL SBSQ: CPT

## 2019-04-03 PROCEDURE — 94640 AIRWAY INHALATION TREATMENT: CPT

## 2019-04-03 PROCEDURE — 94761 N-INVAS EAR/PLS OXIMETRY MLT: CPT

## 2019-04-03 PROCEDURE — 2580000003 HC RX 258: Performed by: INTERNAL MEDICINE

## 2019-04-03 PROCEDURE — 1200000000 HC SEMI PRIVATE

## 2019-04-03 RX ORDER — LEVOFLOXACIN 500 MG/1
750 TABLET, FILM COATED ORAL DAILY
Status: DISCONTINUED | OUTPATIENT
Start: 2019-04-03 | End: 2019-04-04 | Stop reason: HOSPADM

## 2019-04-03 RX ADMIN — IPRATROPIUM BROMIDE AND ALBUTEROL SULFATE 1 AMPULE: .5; 3 SOLUTION RESPIRATORY (INHALATION) at 07:57

## 2019-04-03 RX ADMIN — Medication 10 ML: at 08:07

## 2019-04-03 RX ADMIN — IPRATROPIUM BROMIDE AND ALBUTEROL SULFATE 1 AMPULE: .5; 3 SOLUTION RESPIRATORY (INHALATION) at 19:28

## 2019-04-03 RX ADMIN — GUAIFENESIN AND DEXTROMETHORPHAN 10 ML: 100; 10 SYRUP ORAL at 22:24

## 2019-04-03 RX ADMIN — DOXYCYCLINE 100 MG: 100 INJECTION, POWDER, LYOPHILIZED, FOR SOLUTION INTRAVENOUS at 05:54

## 2019-04-03 RX ADMIN — LEVOFLOXACIN 750 MG: 500 TABLET, FILM COATED ORAL at 15:17

## 2019-04-03 RX ADMIN — IPRATROPIUM BROMIDE AND ALBUTEROL SULFATE 1 AMPULE: .5; 3 SOLUTION RESPIRATORY (INHALATION) at 15:56

## 2019-04-03 RX ADMIN — IPRATROPIUM BROMIDE AND ALBUTEROL SULFATE 1 AMPULE: .5; 3 SOLUTION RESPIRATORY (INHALATION) at 12:07

## 2019-04-03 RX ADMIN — Medication 10 ML: at 22:24

## 2019-04-03 RX ADMIN — LEVOTHYROXINE SODIUM 50 MCG: 50 TABLET ORAL at 05:54

## 2019-04-03 NOTE — PROGRESS NOTES
Bedside report given to [facility]. Call light and bedside table within reach. No needs voiced at this time. Bed in lowest position, brakes locked.

## 2019-04-03 NOTE — PROGRESS NOTES
Hospitalist Progress Note      PCP: Debbie Sparks MD    Date of Admission: 3/30/2019    Chief Complaint: SOB, rib pain    Hospital Course:    75 y.o. female who presented to [facility] with complaints of right-sided chest pain, cough, shortness of breath, fever that started about 5 days back. On Tuesday night she noticed worsening shortness of breath and cough associated with pleuritic right-sided chest pain, producing minimal amount of sputum, no hemoptysis. Also reported subjective fevers since yesterday but no chills or rigors. Patient reports she has had pneumonia in the past 3 or 4 times but none requiring hospitalization. She is a prior smoker who quit about 20 years back. Denies any alcohol use. Denies sick contacts or recent travel. Denies any abdominal pain, nausea, vomiting or diarrhea. Subjective:  Pt is on RA. Afebrile. VSS. Pt without complaints currently. Medications:  Reviewed    Infusion Medications     Scheduled Medications    ipratropium-albuterol  1 ampule Inhalation Q4H WA    levothyroxine  50 mcg Oral Daily    sodium chloride flush  10 mL Intravenous 2 times per day    enoxaparin  40 mg Subcutaneous Daily    cefTRIAXone (ROCEPHIN) IV  1 g Intravenous Q24H    And    doxycycline (VIBRAMYCIN) IV  100 mg Intravenous Q12H     PRN Meds: acetaminophen, albuterol sulfate HFA, guaiFENesin-dextromethorphan, sodium chloride flush, magnesium hydroxide, ondansetron      Intake/Output Summary (Last 24 hours) at 4/3/2019 1301  Last data filed at 4/3/2019 0953  Gross per 24 hour   Intake 480 ml   Output 0 ml   Net 480 ml       Physical Exam Performed:    /69   Pulse 89   Temp 97.6 °F (36.4 °C) (Oral)   Resp 16   Ht 5' 3\" (1.6 m)   Wt 109 lb 12.8 oz (49.8 kg)   SpO2 92%   BMI 19.45 kg/m²     General appearance: Pleasant, elderly female in no apparent distress, appears stated age and cooperative. HEENT: Pupils equal, round, and reactive to light.  Conjunctivae/corneas clear.  Neck: Supple, with full range of motion. No jugular venous distention. Trachea midline. Respiratory:  Normal respiratory effort. Clear to auscultation, bilaterally without Rales/Wheezes/Rhonchi. Cardiovascular: Regular rate and rhythm with normal S1/S2 without murmurs, rubs or gallops. Abdomen: Soft, non-tender, non-distended with normal bowel sounds. Musculoskeletal: No clubbing, cyanosis or edema bilaterally. Full range of motion without deformity. Skin: Skin color, texture, turgor normal.  No rashes or lesions. Neurologic:  Neurovascularly intact without any focal sensory/motor deficits. Cranial nerves: II-XII intact, grossly non-focal.  Psychiatric: Alert and oriented, thought content appropriate, normal insight  Capillary Refill: Brisk,< 3 seconds   Peripheral Pulses: +2 palpable, equal bilaterally       Labs:   Recent Labs     04/01/19  0803 04/02/19  0724   WBC 14.3* 11.1*   HGB 11.1* 11.1*   HCT 33.4* 33.1*    223     Recent Labs     04/01/19  0803 04/02/19  0724   * 135*   K 4.1 3.7    105   CO2 18* 20*   BUN 15 9   CREATININE 0.7 0.6   CALCIUM 8.7 8.9         Radiology:  XR CHEST STANDARD (2 VW)   Final Result   1. Probable right upper lobe pneumonia   2. Linear infiltrates in the left lower lobe most likely represents scarring   from the previous noted left lower lobe pneumonia             Assessment/Plan:    Active Hospital Problems    Diagnosis Date Noted    CAP (community acquired pneumonia) [J18.9] 03/30/2019    PNA (pneumonia) [J18.9] 03/30/2019    History of tobacco abuse [Z87.891] 03/30/2019    Dehydration [E86.0] 03/30/2019    Sepsis ([facility] [address] [A41.9] 03/30/2019       Sepsis due to community acquired pneumonia (clinically improving):   - Patient presented with dyspnea, cough, fever, tachycardia, leukocytosis with chest imaging suggesting LLL pneumonia. Lactic < 2.   - Rapid flu is negative. Legionella and pneumococcal urine antigens are negative.  Blood cultures are NGTD. Sputum culture shows light growth NRF and GNR -- ID and sensitivities are pending -- discussed with micro lab on 4/3 -- pseudomonas ID'd now awaiting sensitivities. - Discontinue IV Rocephin and Doxycycline (s 3/30) and change to PO levaquin (s 4/3). - Continue inhaled bronchodilator therapy. Pulmonary toilet with IS and acapella.      Dehydration (resolved):  - Evident by hyponatremia/hypochloremia and clinically dehydrated on admission. Resolved with IV fluids.      Hypothyroidism:  - Continue Synthroid. TSH normal in March 2019.       DVT Prophylaxis: Lovenox  Diet: DIET GENERAL; No Drinking Straw  Code Status: Full Code    PT/OT Eval Status: not indicated - patient ambulatory - still works full-time    Dispo - Likely tomorrow once sputum culture is finalized.      NICK Escamilla - CNP

## 2019-04-03 NOTE — PLAN OF CARE
Patient remains free from falls during this shift. Oriented to call light. Verbalizes understanding. Alert and oriented x4. Bedside table and call light within reach. Bed in lowest position, brakes locked. Nonskid footwear in place. Will continue to monitor.

## 2019-04-04 VITALS
SYSTOLIC BLOOD PRESSURE: 111 MMHG | RESPIRATION RATE: 16 BRPM | TEMPERATURE: 97.9 F | WEIGHT: 109.8 LBS | OXYGEN SATURATION: 93 % | HEART RATE: 111 BPM | HEIGHT: 63 IN | BODY MASS INDEX: 19.45 KG/M2 | DIASTOLIC BLOOD PRESSURE: 67 MMHG

## 2019-04-04 LAB
BLOOD CULTURE, ROUTINE: NORMAL
CULTURE, BLOOD 2: NORMAL
CULTURE, RESPIRATORY: ABNORMAL
CULTURE, RESPIRATORY: ABNORMAL
GRAM STAIN RESULT: ABNORMAL
ORGANISM: ABNORMAL

## 2019-04-04 PROCEDURE — 2580000003 HC RX 258: Performed by: INTERNAL MEDICINE

## 2019-04-04 PROCEDURE — 6360000002 HC RX W HCPCS: Performed by: INTERNAL MEDICINE

## 2019-04-04 PROCEDURE — 94640 AIRWAY INHALATION TREATMENT: CPT

## 2019-04-04 PROCEDURE — 6370000000 HC RX 637 (ALT 250 FOR IP): Performed by: INTERNAL MEDICINE

## 2019-04-04 PROCEDURE — 94668 MNPJ CHEST WALL SBSQ: CPT

## 2019-04-04 PROCEDURE — 6370000000 HC RX 637 (ALT 250 FOR IP): Performed by: REGISTERED NURSE

## 2019-04-04 RX ORDER — LEVOFLOXACIN 750 MG/1
750 TABLET ORAL DAILY
Qty: 5 TABLET | Refills: 0 | Status: SHIPPED | OUTPATIENT
Start: 2019-04-05 | End: 2019-04-10

## 2019-04-04 RX ADMIN — LEVOFLOXACIN 750 MG: 500 TABLET, FILM COATED ORAL at 08:49

## 2019-04-04 RX ADMIN — LEVOTHYROXINE SODIUM 50 MCG: 50 TABLET ORAL at 09:17

## 2019-04-04 RX ADMIN — Medication 10 ML: at 09:15

## 2019-04-04 RX ADMIN — ENOXAPARIN SODIUM 40 MG: 40 INJECTION SUBCUTANEOUS at 08:50

## 2019-04-04 RX ADMIN — IPRATROPIUM BROMIDE AND ALBUTEROL SULFATE 1 AMPULE: .5; 3 SOLUTION RESPIRATORY (INHALATION) at 08:40

## 2019-04-04 NOTE — DISCHARGE SUMMARY
Hospital Medicine Discharge Summary    Patient ID: Yuli Villalobos      Patient's PCP: Alver Apgar, MD    Admit Date: 3/30/2019     Discharge Date:   2019    Admitting Physician: Fabby Vargas MD     Discharge Physician: NICK Winters - CNP     Discharge Diagnoses: Active Hospital Problems    Diagnosis Date Noted    CAP (community acquired pneumonia) [J18.9] 2019    PNA (pneumonia) [J18.9] 2019    History of tobacco abuse [Z87.891] 2019    Dehydration [E86.0] 2019    Sepsis ([address] [A41.9] 2019       The patient was seen and examined on day of discharge and this discharge summary is in conjunction with any daily progress note from day of discharge. Hospital Course:   75 y. o. female who presented to [facility] with complaints of right-sided chest pain, cough, shortness of breath, fever that started about 5 days back. On Tuesday night she noticed worsening shortness of breath and cough associated with pleuritic right-sided chest pain, producing minimal amount of sputum, no hemoptysis. Also reported subjective fevers since yesterday but no chills or rigors. Patient reports she has had pneumonia in the past 3 or 4 times but none requiring hospitalization. She is a prior smoker who quit about 20 years back. Denies any alcohol use. Denies sick contacts or recent travel. Denies any abdominal pain, nausea, vomiting or diarrhea. Sepsis due to community acquired pneumonia (clinically improving):   - Patient presented with dyspnea, cough, fever, tachycardia, leukocytosis with chest imaging suggesting LLL pneumonia. Lactic < 2.   - Rapid flu is negative. Legionella and pneumococcal urine antigens are negative. Blood cultures are NGTD. Sputum culture grew Pseudomonas aeruginosa.   -Pt started on IV Rocephin and Doxycycline on admission (s 3/30) and then changed to PO levaquin (s 4/3) -- to continue to complete 7 day course.    - Continue inhaled bronchodilator therapy. Pulmonary toilet with IS and acapella.      Dehydration (resolved):  - Evident by hyponatremia/hypochloremia and clinically dehydrated on admission. Resolved with IV fluids.      Hypothyroidism:  - Continue Synthroid. TSH normal in March 2019. Physical Exam Performed:     /69   Pulse 98   Temp 98.1 °F (36.7 °C) (Oral)   Resp 16   Ht 5' 3\" (1.6 m)   Wt 109 lb 12.8 oz (49.8 kg)   SpO2 94%   BMI 19.45 kg/m²       General appearance:  Elderly female in no apparent distress, appears stated age and cooperative. HEENT:  Normal cephalic, atraumatic without obvious deformity. Pupils equal, round, and reactive to light. Extra ocular muscles intact. Conjunctivae/corneas clear. Neck: Supple, with full range of motion. No jugular venous distention. Trachea midline. Respiratory:  Normal respiratory effort. Clear to auscultation, bilaterally without Rales/Wheezes/Rhonchi. Cardiovascular:  Regular rate and rhythm with normal S1/S2 without murmurs, rubs or gallops. Abdomen: Soft, non-tender, non-distended with normal bowel sounds. Musculoskeletal:  No clubbing, cyanosis or edema bilaterally. Full range of motion without deformity. Skin: Skin color, texture, turgor normal.  No rashes or lesions. Neurologic:  Neurovascularly intact without any focal sensory/motor deficits. Cranial nerves: II-XII intact, grossly non-focal.  Psychiatric:  Alert and oriented, thought content appropriate, normal insight  Capillary Refill: Brisk,< 3 seconds   Peripheral Pulses: +2 palpable, equal bilaterally       Labs:  For convenience and continuity at follow-up the following most recent labs are provided:      CBC:    Lab Results   Component Value Date    WBC 11.1 04/02/2019    HGB 11.1 04/02/2019    HCT 33.1 04/02/2019     04/02/2019       Renal:    Lab Results   Component Value Date     04/02/2019    K 3.7 04/02/2019     04/02/2019    CO2 20 04/02/2019    BUN 9 04/02/2019 CREATININE 0.6 04/02/2019    CALCIUM 8.9 04/02/2019         Significant Diagnostic Studies    Radiology:   XR CHEST STANDARD (2 VW)   Final Result   1. Probable right upper lobe pneumonia   2. Linear infiltrates in the left lower lobe most likely represents scarring   from the previous noted left lower lobe pneumonia                Consults:     IP CONSULT TO HOSPITALIST  IP CONSULT TO SPIRITUAL SERVICES    Disposition:  Home     Condition at Discharge: Stable    Discharge Instructions/Follow-up:  Follow-up with PCP     Code Status:  Full Code     Activity: activity as tolerated    Diet: regular diet      Discharge Medications:     Current Discharge Medication List           Details   levofloxacin (LEVAQUIN) 750 MG tablet Take 1 tablet by mouth daily for 5 days  Qty: 5 tablet, Refills: 0              Details   levothyroxine (SYNTHROID) 50 MCG tablet TK 1 T PO QD  Refills: 1      vitamin B-12 (CYANOCOBALAMIN) 1000 MCG tablet Take 1,000 mcg by mouth      vitamin D (CHOLECALCIFEROL) 1000 UNIT TABS tablet Take 1,000 Units by mouth      guaifenesin-dextromethorphan (ROBITUSSIN DM) 100-10 MG/5ML syrup Take 10 mLs by mouth 3 times daily as needed for Cough. Qty: 120 mL, Refills: 0      albuterol (PROVENTIL HFA) 108 (90 BASE) MCG/ACT inhaler Inhale 2 puffs into the lungs every 4 hours as needed for Shortness of Breath. Qty: 1 Inhaler, Refills: 0             Time Spent on discharge is more than 45 minutes in the examination, evaluation, counseling and review of medications and discharge plan. Signed:    [address] [facility], APRN - [facility]   4/4/2019      Thank you Corry Baird MD for the opportunity to be involved in this patient's care. If you have any questions or concerns please feel free to contact me at [phone].

## 2019-04-04 NOTE — PROGRESS NOTES
Went over discharge instructions with patient including new prescription for Levaquin, when to follow up with her PCP, and when to call the doctor. Pt verbalized understanding and denies any questions at this time. IV removed without complications, dressing applied. Pt is currently waiting for her ride to pick her up and states they will be here bewteen [id number].

## 2019-04-29 PROBLEM — E86.0 DEHYDRATION: Status: RESOLVED | Noted: 2019-03-30 | Resolved: 2019-04-29

## 2020-11-03 PROBLEM — J18.9 PNEUMONIA: Status: RESOLVED | Noted: 2020-11-03 | Resolved: 2020-11-03

## 2021-03-11 ENCOUNTER — HOSPITAL ENCOUNTER (EMERGENCY)
Age: 76
Discharge: ANOTHER HOSPITAL | End: 2021-03-12
Attending: EMERGENCY MEDICINE | Admitting: INTERNAL MEDICINE
Payer: MEDICARE

## 2021-03-11 DIAGNOSIS — K92.2 GASTROINTESTINAL HEMORRHAGE, UNSPECIFIED GASTROINTESTINAL HEMORRHAGE TYPE: Primary | ICD-10-CM

## 2021-03-11 DIAGNOSIS — D50.0 IRON DEFICIENCY ANEMIA DUE TO CHRONIC BLOOD LOSS: ICD-10-CM

## 2021-03-11 DIAGNOSIS — R58 ACUTE HEMORRHAGE: ICD-10-CM

## 2021-03-11 LAB
ANION GAP SERPL CALCULATED.3IONS-SCNC: 17 MMOL/L (ref 3–16)
BUN BLDV-MCNC: 24 MG/DL (ref 7–20)
CALCIUM SERPL-MCNC: 8.4 MG/DL (ref 8.3–10.6)
CHLORIDE BLD-SCNC: 101 MMOL/L (ref 99–110)
CO2: 16 MMOL/L (ref 21–32)
CREAT SERPL-MCNC: 1.1 MG/DL (ref 0.6–1.2)
GFR AFRICAN AMERICAN: 58
GFR NON-AFRICAN AMERICAN: 48
GLUCOSE BLD-MCNC: 277 MG/DL (ref 70–99)
INR BLD: 1.24 (ref 0.86–1.14)
LACTIC ACID, SEPSIS: 8.8 MMOL/L (ref 0.4–1.9)
POTASSIUM REFLEX MAGNESIUM: 4.1 MMOL/L (ref 3.5–5.1)
PROTHROMBIN TIME: 14.4 SEC (ref 10–13.2)
SODIUM BLD-SCNC: 134 MMOL/L (ref 136–145)
TROPONIN: <0.01 NG/ML

## 2021-03-11 PROCEDURE — 85610 PROTHROMBIN TIME: CPT

## 2021-03-11 PROCEDURE — 80048 BASIC METABOLIC PNL TOTAL CA: CPT

## 2021-03-11 PROCEDURE — 84484 ASSAY OF TROPONIN QUANT: CPT

## 2021-03-11 PROCEDURE — 96375 TX/PRO/DX INJ NEW DRUG ADDON: CPT

## 2021-03-11 PROCEDURE — 86923 COMPATIBILITY TEST ELECTRIC: CPT

## 2021-03-11 PROCEDURE — P9016 RBC LEUKOCYTES REDUCED: HCPCS

## 2021-03-11 PROCEDURE — 85025 COMPLETE CBC W/AUTO DIFF WBC: CPT

## 2021-03-11 PROCEDURE — 96367 TX/PROPH/DG ADDL SEQ IV INF: CPT

## 2021-03-11 PROCEDURE — 96366 THER/PROPH/DIAG IV INF ADDON: CPT

## 2021-03-11 PROCEDURE — 85018 HEMOGLOBIN: CPT

## 2021-03-11 PROCEDURE — 83605 ASSAY OF LACTIC ACID: CPT

## 2021-03-11 PROCEDURE — 86901 BLOOD TYPING SEROLOGIC RH(D): CPT

## 2021-03-11 PROCEDURE — 96372 THER/PROPH/DIAG INJ SC/IM: CPT

## 2021-03-11 PROCEDURE — 86850 RBC ANTIBODY SCREEN: CPT

## 2021-03-11 PROCEDURE — 99291 CRITICAL CARE FIRST HOUR: CPT

## 2021-03-11 PROCEDURE — 86900 BLOOD TYPING SEROLOGIC ABO: CPT

## 2021-03-11 PROCEDURE — 85014 HEMATOCRIT: CPT

## 2021-03-11 PROCEDURE — 93005 ELECTROCARDIOGRAM TRACING: CPT | Performed by: EMERGENCY MEDICINE

## 2021-03-11 PROCEDURE — 96365 THER/PROPH/DIAG IV INF INIT: CPT

## 2021-03-11 PROCEDURE — 82803 BLOOD GASES ANY COMBINATION: CPT

## 2021-03-11 RX ORDER — SODIUM CHLORIDE 9 MG/ML
INJECTION, SOLUTION INTRAVENOUS PRN
Status: DISCONTINUED | OUTPATIENT
Start: 2021-03-11 | End: 2021-03-12 | Stop reason: HOSPADM

## 2021-03-11 NOTE — Clinical Note
Patient Class: Inpatient [101]   REQUIRED: Diagnosis: Acute upper GI bleed [[id number]]   Estimated Length of Stay: Estimated stay of more than 2 midnights   Admitting Provider: Tiffanie Gruber [[id number]]   Telemetry Bed Required?: Yes

## 2021-03-12 ENCOUNTER — ANESTHESIA (OUTPATIENT)
Dept: ENDOSCOPY | Age: 76
DRG: 432 | End: 2021-03-12
Payer: MEDICARE

## 2021-03-12 ENCOUNTER — HOSPITAL ENCOUNTER (INPATIENT)
Age: 76
LOS: 3 days | Discharge: HOME | DRG: 432 | End: 2021-03-15
Attending: INTERNAL MEDICINE | Admitting: INTERNAL MEDICINE
Payer: MEDICARE

## 2021-03-12 ENCOUNTER — APPOINTMENT (OUTPATIENT)
Dept: ULTRASOUND IMAGING | Age: 76
DRG: 432 | End: 2021-03-12
Attending: INTERNAL MEDICINE
Payer: MEDICARE

## 2021-03-12 ENCOUNTER — APPOINTMENT (OUTPATIENT)
Dept: CT IMAGING | Age: 76
End: 2021-03-12
Payer: MEDICARE

## 2021-03-12 ENCOUNTER — ANESTHESIA EVENT (OUTPATIENT)
Dept: ENDOSCOPY | Age: 76
DRG: 432 | End: 2021-03-12
Payer: MEDICARE

## 2021-03-12 ENCOUNTER — APPOINTMENT (OUTPATIENT)
Dept: GENERAL RADIOLOGY | Age: 76
DRG: 432 | End: 2021-03-12
Attending: INTERNAL MEDICINE
Payer: MEDICARE

## 2021-03-12 VITALS
DIASTOLIC BLOOD PRESSURE: 64 MMHG | BODY MASS INDEX: 19.49 KG/M2 | SYSTOLIC BLOOD PRESSURE: 104 MMHG | WEIGHT: 110 LBS | OXYGEN SATURATION: 94 % | RESPIRATION RATE: 21 BRPM | TEMPERATURE: 98.7 F | HEART RATE: 108 BPM

## 2021-03-12 VITALS — OXYGEN SATURATION: 100 % | DIASTOLIC BLOOD PRESSURE: 65 MMHG | SYSTOLIC BLOOD PRESSURE: 97 MMHG

## 2021-03-12 DIAGNOSIS — K92.2 ACUTE UPPER GI BLEED: ICD-10-CM

## 2021-03-12 DIAGNOSIS — K92.2 ACUTE GI BLEEDING: Primary | ICD-10-CM

## 2021-03-12 DIAGNOSIS — K74.3 PRIMARY BILIARY CIRRHOSIS (HCC): ICD-10-CM

## 2021-03-12 LAB
ABO/RH: NORMAL
ABO/RH: NORMAL
ALBUMIN SERPL-MCNC: 2.7 G/DL (ref 3.4–5)
ALP BLD-CCNC: 92 U/L (ref 40–129)
ALT SERPL-CCNC: 17 U/L (ref 10–40)
ANION GAP SERPL CALCULATED.3IONS-SCNC: 7 MMOL/L (ref 3–16)
ANTIBODY SCREEN: NORMAL
APTT: 25.5 SEC (ref 24.2–36.2)
AST SERPL-CCNC: 23 U/L (ref 15–37)
BASE EXCESS VENOUS: -9.8 MMOL/L (ref -3–3)
BASOPHILS ABSOLUTE: 0.1 K/UL (ref 0–0.2)
BASOPHILS ABSOLUTE: 0.2 K/UL (ref 0–0.2)
BASOPHILS RELATIVE PERCENT: 0.9 %
BASOPHILS RELATIVE PERCENT: 1.3 %
BILIRUB SERPL-MCNC: 0.6 MG/DL (ref 0–1)
BILIRUBIN DIRECT: <0.2 MG/DL (ref 0–0.3)
BILIRUBIN, INDIRECT: ABNORMAL MG/DL (ref 0–1)
BLOOD BANK DISPENSE STATUS: NORMAL
BLOOD BANK PRODUCT CODE: NORMAL
BPU ID: NORMAL
BUN BLDV-MCNC: 22 MG/DL (ref 7–20)
CALCIUM SERPL-MCNC: 7.9 MG/DL (ref 8.3–10.6)
CARBOXYHEMOGLOBIN: 12.2 % (ref 0–1.5)
CHLORIDE BLD-SCNC: 106 MMOL/L (ref 99–110)
CO2: 22 MMOL/L (ref 21–32)
CREAT SERPL-MCNC: 0.9 MG/DL (ref 0.6–1.2)
DESCRIPTION BLOOD BANK: NORMAL
EKG ATRIAL RATE: 115 BPM
EKG DIAGNOSIS: NORMAL
EKG P AXIS: 80 DEGREES
EKG P-R INTERVAL: 128 MS
EKG Q-T INTERVAL: 336 MS
EKG QRS DURATION: 76 MS
EKG QTC CALCULATION (BAZETT): 464 MS
EKG R AXIS: 83 DEGREES
EKG T AXIS: 62 DEGREES
EKG VENTRICULAR RATE: 115 BPM
EOSINOPHILS ABSOLUTE: 0 K/UL (ref 0–0.6)
EOSINOPHILS ABSOLUTE: 0.3 K/UL (ref 0–0.6)
EOSINOPHILS RELATIVE PERCENT: 0.3 %
EOSINOPHILS RELATIVE PERCENT: 1.9 %
FERRITIN: 9.6 NG/ML (ref 15–150)
GFR AFRICAN AMERICAN: >60
GFR NON-AFRICAN AMERICAN: >60
GLUCOSE BLD-MCNC: 118 MG/DL (ref 70–99)
GLUCOSE BLD-MCNC: 156 MG/DL (ref 70–99)
GLUCOSE BLD-MCNC: 179 MG/DL (ref 70–99)
GLUCOSE BLD-MCNC: 253 MG/DL (ref 70–99)
HCO3 VENOUS: 13.5 MMOL/L (ref 23–29)
HCT VFR BLD CALC: 11.7 % (ref 36–48)
HCT VFR BLD CALC: 12.4 % (ref 36–48)
HCT VFR BLD CALC: 21.1 % (ref 36–48)
HCT VFR BLD CALC: 21.2 % (ref 36–48)
HEMATOLOGY PATH CONSULT: NO
HEMATOLOGY PATH CONSULT: NORMAL
HEMATOLOGY PATH CONSULT: YES
HEMOGLOBIN: 3.2 G/DL (ref 12–16)
HEMOGLOBIN: 3.3 G/DL (ref 12–16)
HEMOGLOBIN: 6.8 G/DL (ref 12–16)
HEMOGLOBIN: 6.8 G/DL (ref 12–16)
INR BLD: 1.14 (ref 0.86–1.14)
IRON: 21 UG/DL (ref 37–145)
LACTIC ACID: 0.9 MMOL/L (ref 0.4–2)
LACTIC ACID: 1.3 MMOL/L (ref 0.4–2)
LYMPHOCYTES ABSOLUTE: 1.2 K/UL (ref 1–5.1)
LYMPHOCYTES ABSOLUTE: 3.3 K/UL (ref 1–5.1)
LYMPHOCYTES RELATIVE PERCENT: 14.5 %
LYMPHOCYTES RELATIVE PERCENT: 21.8 %
MAGNESIUM: 1.9 MG/DL (ref 1.8–2.4)
MCH RBC QN AUTO: 21.5 PG (ref 26–34)
MCH RBC QN AUTO: 26.6 PG (ref 26–34)
MCHC RBC AUTO-ENTMCNC: 26.8 G/DL (ref 31–36)
MCHC RBC AUTO-ENTMCNC: 32.2 G/DL (ref 31–36)
MCV RBC AUTO: 80.3 FL (ref 80–100)
MCV RBC AUTO: 82.4 FL (ref 80–100)
METHEMOGLOBIN VENOUS: 0.4 %
MONOCYTES ABSOLUTE: 0.7 K/UL (ref 0–1.3)
MONOCYTES ABSOLUTE: 1.2 K/UL (ref 0–1.3)
MONOCYTES RELATIVE PERCENT: 7.7 %
MONOCYTES RELATIVE PERCENT: 7.8 %
NEUTROPHILS ABSOLUTE: 10.2 K/UL (ref 1.7–7.7)
NEUTROPHILS ABSOLUTE: 6.5 K/UL (ref 1.7–7.7)
NEUTROPHILS RELATIVE PERCENT: 67.3 %
NEUTROPHILS RELATIVE PERCENT: 76.5 %
O2 CONTENT, VEN: 5 VOL %
O2 SAT, VEN: 99 %
O2 THERAPY: ABNORMAL
PCO2, VEN: 18.9 MMHG (ref 40–50)
PDW BLD-RTO: 18.1 % (ref 12.4–15.4)
PDW BLD-RTO: 19 % (ref 12.4–15.4)
PERFORMED ON: ABNORMAL
PH VENOUS: 7.47 (ref 7.35–7.45)
PLATELET # BLD: 130 K/UL (ref 135–450)
PLATELET # BLD: 281 K/UL (ref 135–450)
PMV BLD AUTO: 8.2 FL (ref 5–10.5)
PMV BLD AUTO: 9.1 FL (ref 5–10.5)
PO2, VEN: 209.4 MMHG (ref 25–40)
POTASSIUM SERPL-SCNC: 4.3 MMOL/L (ref 3.5–5.1)
PROTHROMBIN TIME: 13.2 SEC (ref 10–13.2)
RBC # BLD: 1.54 M/UL (ref 4–5.2)
RBC # BLD: 2.57 M/UL (ref 4–5.2)
SARS-COV-2, NAAT: NOT DETECTED
SLIDE REVIEW: ABNORMAL
SODIUM BLD-SCNC: 135 MMOL/L (ref 136–145)
TCO2 CALC VENOUS: 14 MMOL/L
TOTAL IRON BINDING CAPACITY: 316 UG/DL (ref 260–445)
TOTAL PROTEIN: 5.1 G/DL (ref 6.4–8.2)
WBC # BLD: 15.1 K/UL (ref 4–11)
WBC # BLD: 8.5 K/UL (ref 4–11)

## 2021-03-12 PROCEDURE — 0DJD8ZZ INSPECTION OF LOWER INTESTINAL TRACT, VIA NATURAL OR ARTIFICIAL OPENING ENDOSCOPIC: ICD-10-PCS | Performed by: INTERNAL MEDICINE

## 2021-03-12 PROCEDURE — 86923 COMPATIBILITY TEST ELECTRIC: CPT

## 2021-03-12 PROCEDURE — 6360000002 HC RX W HCPCS: Performed by: NURSE ANESTHETIST, CERTIFIED REGISTERED

## 2021-03-12 PROCEDURE — 6370000000 HC RX 637 (ALT 250 FOR IP): Performed by: INTERNAL MEDICINE

## 2021-03-12 PROCEDURE — 2500000003 HC RX 250 WO HCPCS: Performed by: NURSE ANESTHETIST, CERTIFIED REGISTERED

## 2021-03-12 PROCEDURE — 83550 IRON BINDING TEST: CPT

## 2021-03-12 PROCEDURE — 74174 CTA ABD&PLVS W/CONTRAST: CPT

## 2021-03-12 PROCEDURE — 6360000002 HC RX W HCPCS: Performed by: INTERNAL MEDICINE

## 2021-03-12 PROCEDURE — 96372 THER/PROPH/DIAG INJ SC/IM: CPT

## 2021-03-12 PROCEDURE — 96375 TX/PRO/DX INJ NEW DRUG ADDON: CPT

## 2021-03-12 PROCEDURE — C9113 INJ PANTOPRAZOLE SODIUM, VIA: HCPCS | Performed by: INTERNAL MEDICINE

## 2021-03-12 PROCEDURE — 36430 TRANSFUSION BLD/BLD COMPNT: CPT

## 2021-03-12 PROCEDURE — 83605 ASSAY OF LACTIC ACID: CPT

## 2021-03-12 PROCEDURE — 85014 HEMATOCRIT: CPT

## 2021-03-12 PROCEDURE — 83735 ASSAY OF MAGNESIUM: CPT

## 2021-03-12 PROCEDURE — 96367 TX/PROPH/DG ADDL SEQ IV INF: CPT

## 2021-03-12 PROCEDURE — 3609027000 HC COLONOSCOPY: Performed by: INTERNAL MEDICINE

## 2021-03-12 PROCEDURE — 86901 BLOOD TYPING SEROLOGIC RH(D): CPT

## 2021-03-12 PROCEDURE — 6360000002 HC RX W HCPCS: Performed by: STUDENT IN AN ORGANIZED HEALTH CARE EDUCATION/TRAINING PROGRAM

## 2021-03-12 PROCEDURE — 2580000003 HC RX 258: Performed by: NURSE ANESTHETIST, CERTIFIED REGISTERED

## 2021-03-12 PROCEDURE — 99223 1ST HOSP IP/OBS HIGH 75: CPT | Performed by: INTERNAL MEDICINE

## 2021-03-12 PROCEDURE — 6370000000 HC RX 637 (ALT 250 FOR IP): Performed by: STUDENT IN AN ORGANIZED HEALTH CARE EDUCATION/TRAINING PROGRAM

## 2021-03-12 PROCEDURE — 82105 ALPHA-FETOPROTEIN SERUM: CPT

## 2021-03-12 PROCEDURE — 82728 ASSAY OF FERRITIN: CPT

## 2021-03-12 PROCEDURE — 86850 RBC ANTIBODY SCREEN: CPT

## 2021-03-12 PROCEDURE — 85018 HEMOGLOBIN: CPT

## 2021-03-12 PROCEDURE — 86900 BLOOD TYPING SEROLOGIC ABO: CPT

## 2021-03-12 PROCEDURE — 06L38CZ OCCLUSION OF ESOPHAGEAL VEIN WITH EXTRALUMINAL DEVICE, VIA NATURAL OR ARTIFICIAL OPENING ENDOSCOPIC: ICD-10-PCS | Performed by: INTERNAL MEDICINE

## 2021-03-12 PROCEDURE — 85610 PROTHROMBIN TIME: CPT

## 2021-03-12 PROCEDURE — 36556 INSERT NON-TUNNEL CV CATH: CPT

## 2021-03-12 PROCEDURE — 80048 BASIC METABOLIC PNL TOTAL CA: CPT

## 2021-03-12 PROCEDURE — 93010 ELECTROCARDIOGRAM REPORT: CPT | Performed by: INTERNAL MEDICINE

## 2021-03-12 PROCEDURE — 6360000002 HC RX W HCPCS: Performed by: EMERGENCY MEDICINE

## 2021-03-12 PROCEDURE — 85730 THROMBOPLASTIN TIME PARTIAL: CPT

## 2021-03-12 PROCEDURE — 80076 HEPATIC FUNCTION PANEL: CPT

## 2021-03-12 PROCEDURE — 71045 X-RAY EXAM CHEST 1 VIEW: CPT

## 2021-03-12 PROCEDURE — 6360000004 HC RX CONTRAST MEDICATION: Performed by: EMERGENCY MEDICINE

## 2021-03-12 PROCEDURE — 2709999900 HC NON-CHARGEABLE SUPPLY: Performed by: INTERNAL MEDICINE

## 2021-03-12 PROCEDURE — C9113 INJ PANTOPRAZOLE SODIUM, VIA: HCPCS | Performed by: EMERGENCY MEDICINE

## 2021-03-12 PROCEDURE — 85025 COMPLETE CBC W/AUTO DIFF WBC: CPT

## 2021-03-12 PROCEDURE — P9017 PLASMA 1 DONOR FRZ W/IN 8 HR: HCPCS

## 2021-03-12 PROCEDURE — 87635 SARS-COV-2 COVID-19 AMP PRB: CPT

## 2021-03-12 PROCEDURE — 2580000003 HC RX 258: Performed by: INTERNAL MEDICINE

## 2021-03-12 PROCEDURE — 96366 THER/PROPH/DIAG IV INF ADDON: CPT

## 2021-03-12 PROCEDURE — 3700000001 HC ADD 15 MINUTES (ANESTHESIA): Performed by: INTERNAL MEDICINE

## 2021-03-12 PROCEDURE — 83036 HEMOGLOBIN GLYCOSYLATED A1C: CPT

## 2021-03-12 PROCEDURE — 3609012300 HC EGD BAND LIGATION ESOPHGEAL/GASTRIC VARICES: Performed by: INTERNAL MEDICINE

## 2021-03-12 PROCEDURE — 2000000000 HC ICU R&B

## 2021-03-12 PROCEDURE — 36556 INSERT NON-TUNNEL CV CATH: CPT | Performed by: INTERNAL MEDICINE

## 2021-03-12 PROCEDURE — 02HV33Z INSERTION OF INFUSION DEVICE INTO SUPERIOR VENA CAVA, PERCUTANEOUS APPROACH: ICD-10-PCS | Performed by: INTERNAL MEDICINE

## 2021-03-12 PROCEDURE — 36415 COLL VENOUS BLD VENIPUNCTURE: CPT

## 2021-03-12 PROCEDURE — 3700000000 HC ANESTHESIA ATTENDED CARE: Performed by: INTERNAL MEDICINE

## 2021-03-12 PROCEDURE — 83540 ASSAY OF IRON: CPT

## 2021-03-12 PROCEDURE — 2580000003 HC RX 258: Performed by: STUDENT IN AN ORGANIZED HEALTH CARE EDUCATION/TRAINING PROGRAM

## 2021-03-12 PROCEDURE — P9016 RBC LEUKOCYTES REDUCED: HCPCS

## 2021-03-12 PROCEDURE — 96365 THER/PROPH/DIAG IV INF INIT: CPT

## 2021-03-12 RX ORDER — ONDANSETRON 2 MG/ML
4 INJECTION INTRAMUSCULAR; INTRAVENOUS EVERY 6 HOURS PRN
Status: DISCONTINUED | OUTPATIENT
Start: 2021-03-12 | End: 2021-03-12 | Stop reason: HOSPADM

## 2021-03-12 RX ORDER — OCTREOTIDE ACETATE 100 UG/ML
50 INJECTION, SOLUTION INTRAVENOUS; SUBCUTANEOUS ONCE
Status: COMPLETED | OUTPATIENT
Start: 2021-03-12 | End: 2021-03-12

## 2021-03-12 RX ORDER — DEXTROSE MONOHYDRATE 50 MG/ML
100 INJECTION, SOLUTION INTRAVENOUS PRN
Status: DISCONTINUED | OUTPATIENT
Start: 2021-03-12 | End: 2021-03-15 | Stop reason: HOSPADM

## 2021-03-12 RX ORDER — SODIUM CHLORIDE 9 MG/ML
10 INJECTION INTRAVENOUS EVERY 12 HOURS
Status: DISCONTINUED | OUTPATIENT
Start: 2021-03-12 | End: 2021-03-12

## 2021-03-12 RX ORDER — PROPOFOL 10 MG/ML
INJECTION, EMULSION INTRAVENOUS PRN
Status: DISCONTINUED | OUTPATIENT
Start: 2021-03-12 | End: 2021-03-12 | Stop reason: SDUPTHER

## 2021-03-12 RX ORDER — SODIUM CHLORIDE 9 MG/ML
INJECTION, SOLUTION INTRAVENOUS PRN
Status: DISCONTINUED | OUTPATIENT
Start: 2021-03-12 | End: 2021-03-15 | Stop reason: HOSPADM

## 2021-03-12 RX ORDER — PROMETHAZINE HYDROCHLORIDE 25 MG/1
12.5 TABLET ORAL EVERY 6 HOURS PRN
Status: DISCONTINUED | OUTPATIENT
Start: 2021-03-12 | End: 2021-03-12 | Stop reason: HOSPADM

## 2021-03-12 RX ORDER — PANTOPRAZOLE SODIUM 40 MG/10ML
40 INJECTION, POWDER, LYOPHILIZED, FOR SOLUTION INTRAVENOUS EVERY 12 HOURS
Status: DISCONTINUED | OUTPATIENT
Start: 2021-03-12 | End: 2021-03-12

## 2021-03-12 RX ORDER — SODIUM CHLORIDE 0.9 % (FLUSH) 0.9 %
10 SYRINGE (ML) INJECTION PRN
Status: DISCONTINUED | OUTPATIENT
Start: 2021-03-12 | End: 2021-03-12 | Stop reason: HOSPADM

## 2021-03-12 RX ORDER — PANTOPRAZOLE SODIUM 40 MG/10ML
40 INJECTION, POWDER, LYOPHILIZED, FOR SOLUTION INTRAVENOUS 2 TIMES DAILY
Status: DISCONTINUED | OUTPATIENT
Start: 2021-03-12 | End: 2021-03-12 | Stop reason: HOSPADM

## 2021-03-12 RX ORDER — 0.9 % SODIUM CHLORIDE 0.9 %
1000 INTRAVENOUS SOLUTION INTRAVENOUS ONCE
Status: COMPLETED | OUTPATIENT
Start: 2021-03-12 | End: 2021-03-12

## 2021-03-12 RX ORDER — ACETAMINOPHEN 325 MG/1
650 TABLET ORAL EVERY 6 HOURS PRN
Status: DISCONTINUED | OUTPATIENT
Start: 2021-03-12 | End: 2021-03-12 | Stop reason: HOSPADM

## 2021-03-12 RX ORDER — ACETAMINOPHEN 325 MG/1
650 TABLET ORAL EVERY 6 HOURS PRN
Status: DISCONTINUED | OUTPATIENT
Start: 2021-03-12 | End: 2021-03-15 | Stop reason: HOSPADM

## 2021-03-12 RX ORDER — DEXTROSE MONOHYDRATE 25 G/50ML
12.5 INJECTION, SOLUTION INTRAVENOUS PRN
Status: DISCONTINUED | OUTPATIENT
Start: 2021-03-12 | End: 2021-03-15 | Stop reason: HOSPADM

## 2021-03-12 RX ORDER — SODIUM CHLORIDE 0.9 % (FLUSH) 0.9 %
10 SYRINGE (ML) INJECTION EVERY 12 HOURS SCHEDULED
Status: DISCONTINUED | OUTPATIENT
Start: 2021-03-12 | End: 2021-03-15 | Stop reason: HOSPADM

## 2021-03-12 RX ORDER — PROMETHAZINE HYDROCHLORIDE 25 MG/1
12.5 TABLET ORAL EVERY 6 HOURS PRN
Status: DISCONTINUED | OUTPATIENT
Start: 2021-03-12 | End: 2021-03-15 | Stop reason: HOSPADM

## 2021-03-12 RX ORDER — SODIUM CHLORIDE 0.9 % (FLUSH) 0.9 %
10 SYRINGE (ML) INJECTION EVERY 12 HOURS SCHEDULED
Status: DISCONTINUED | OUTPATIENT
Start: 2021-03-12 | End: 2021-03-12 | Stop reason: HOSPADM

## 2021-03-12 RX ORDER — POLYVINYL ALCOHOL 14 MG/ML
1 SOLUTION/ DROPS OPHTHALMIC PRN
Status: DISCONTINUED | OUTPATIENT
Start: 2021-03-12 | End: 2021-03-15 | Stop reason: HOSPADM

## 2021-03-12 RX ORDER — SODIUM CHLORIDE 9 MG/ML
INJECTION, SOLUTION INTRAVENOUS CONTINUOUS PRN
Status: DISCONTINUED | OUTPATIENT
Start: 2021-03-12 | End: 2021-03-12 | Stop reason: SDUPTHER

## 2021-03-12 RX ORDER — ONDANSETRON 2 MG/ML
4 INJECTION INTRAMUSCULAR; INTRAVENOUS EVERY 6 HOURS PRN
Status: DISCONTINUED | OUTPATIENT
Start: 2021-03-12 | End: 2021-03-15 | Stop reason: HOSPADM

## 2021-03-12 RX ORDER — PANTOPRAZOLE SODIUM 40 MG/10ML
40 INJECTION, POWDER, LYOPHILIZED, FOR SOLUTION INTRAVENOUS ONCE
Status: COMPLETED | OUTPATIENT
Start: 2021-03-12 | End: 2021-03-12

## 2021-03-12 RX ORDER — ACETAMINOPHEN 650 MG/1
650 SUPPOSITORY RECTAL EVERY 6 HOURS PRN
Status: DISCONTINUED | OUTPATIENT
Start: 2021-03-12 | End: 2021-03-15 | Stop reason: HOSPADM

## 2021-03-12 RX ORDER — POLYETHYLENE GLYCOL 3350 17 G/17G
17 POWDER, FOR SOLUTION ORAL DAILY PRN
Status: DISCONTINUED | OUTPATIENT
Start: 2021-03-12 | End: 2021-03-12 | Stop reason: HOSPADM

## 2021-03-12 RX ORDER — ACETAMINOPHEN 650 MG/1
650 SUPPOSITORY RECTAL EVERY 6 HOURS PRN
Status: DISCONTINUED | OUTPATIENT
Start: 2021-03-12 | End: 2021-03-12 | Stop reason: HOSPADM

## 2021-03-12 RX ORDER — NICOTINE POLACRILEX 4 MG
15 LOZENGE BUCCAL PRN
Status: DISCONTINUED | OUTPATIENT
Start: 2021-03-12 | End: 2021-03-15 | Stop reason: HOSPADM

## 2021-03-12 RX ORDER — SODIUM CHLORIDE 0.9 % (FLUSH) 0.9 %
10 SYRINGE (ML) INJECTION PRN
Status: DISCONTINUED | OUTPATIENT
Start: 2021-03-12 | End: 2021-03-15 | Stop reason: HOSPADM

## 2021-03-12 RX ORDER — INSULIN LISPRO 100 [IU]/ML
0-6 INJECTION, SOLUTION INTRAVENOUS; SUBCUTANEOUS EVERY 6 HOURS
Status: DISCONTINUED | OUTPATIENT
Start: 2021-03-12 | End: 2021-03-15 | Stop reason: HOSPADM

## 2021-03-12 RX ORDER — SODIUM CHLORIDE 9 MG/ML
INJECTION, SOLUTION INTRAVENOUS PRN
Status: DISCONTINUED | OUTPATIENT
Start: 2021-03-12 | End: 2021-03-12 | Stop reason: HOSPADM

## 2021-03-12 RX ORDER — LEVOTHYROXINE SODIUM 0.05 MG/1
50 TABLET ORAL DAILY
Status: DISCONTINUED | OUTPATIENT
Start: 2021-03-12 | End: 2021-03-15 | Stop reason: HOSPADM

## 2021-03-12 RX ADMIN — PROPOFOL 20 MG: 10 INJECTION, EMULSION INTRAVENOUS at 16:32

## 2021-03-12 RX ADMIN — PROPOFOL 20 MG: 10 INJECTION, EMULSION INTRAVENOUS at 16:57

## 2021-03-12 RX ADMIN — SODIUM CHLORIDE: 9 INJECTION, SOLUTION INTRAVENOUS at 16:17

## 2021-03-12 RX ADMIN — POLYETHYLENE GLYCOL 3350, SODIUM SULFATE ANHYDROUS, SODIUM BICARBONATE, SODIUM CHLORIDE, POTASSIUM CHLORIDE 2000 ML: 236; 22.74; 6.74; 5.86; 2.97 POWDER, FOR SOLUTION ORAL at 12:30

## 2021-03-12 RX ADMIN — PANTOPRAZOLE SODIUM 40 MG: 40 INJECTION, POWDER, FOR SOLUTION INTRAVENOUS at 00:09

## 2021-03-12 RX ADMIN — PHENYLEPHRINE HYDROCHLORIDE 100 MCG: 10 INJECTION, SOLUTION INTRAMUSCULAR; INTRAVENOUS; SUBCUTANEOUS at 16:38

## 2021-03-12 RX ADMIN — DEXMEDETOMIDINE HYDROCHLORIDE 10 MCG: 100 INJECTION, SOLUTION INTRAVENOUS at 16:59

## 2021-03-12 RX ADMIN — INSULIN LISPRO 3 UNITS: 100 INJECTION, SOLUTION INTRAVENOUS; SUBCUTANEOUS at 09:12

## 2021-03-12 RX ADMIN — IOPAMIDOL 75 ML: 755 INJECTION, SOLUTION INTRAVENOUS at 00:42

## 2021-03-12 RX ADMIN — DEXMEDETOMIDINE HYDROCHLORIDE 15 MCG: 100 INJECTION, SOLUTION INTRAVENOUS at 16:31

## 2021-03-12 RX ADMIN — INSULIN LISPRO 1 UNITS: 100 INJECTION, SOLUTION INTRAVENOUS; SUBCUTANEOUS at 14:05

## 2021-03-12 RX ADMIN — CEFTRIAXONE SODIUM 2000 MG: 2 INJECTION, POWDER, FOR SOLUTION INTRAMUSCULAR; INTRAVENOUS at 02:13

## 2021-03-12 RX ADMIN — DEXMEDETOMIDINE HYDROCHLORIDE 10 MCG: 100 INJECTION, SOLUTION INTRAVENOUS at 17:17

## 2021-03-12 RX ADMIN — DEXMEDETOMIDINE HYDROCHLORIDE 10 MCG: 100 INJECTION, SOLUTION INTRAVENOUS at 16:48

## 2021-03-12 RX ADMIN — PROPOFOL 20 MG: 10 INJECTION, EMULSION INTRAVENOUS at 17:15

## 2021-03-12 RX ADMIN — SODIUM CHLORIDE 8 MG/HR: 9 INJECTION, SOLUTION INTRAVENOUS at 09:06

## 2021-03-12 RX ADMIN — OCTREOTIDE ACETATE 25 MCG/HR: 500 INJECTION, SOLUTION INTRAVENOUS; SUBCUTANEOUS at 08:55

## 2021-03-12 RX ADMIN — DEXMEDETOMIDINE HYDROCHLORIDE 10 MCG: 100 INJECTION, SOLUTION INTRAVENOUS at 16:45

## 2021-03-12 RX ADMIN — PROPOFOL 10 MG: 10 INJECTION, EMULSION INTRAVENOUS at 16:46

## 2021-03-12 RX ADMIN — PHENYLEPHRINE HYDROCHLORIDE 100 MCG: 10 INJECTION, SOLUTION INTRAMUSCULAR; INTRAVENOUS; SUBCUTANEOUS at 16:51

## 2021-03-12 RX ADMIN — PROPOFOL 20 MG: 10 INJECTION, EMULSION INTRAVENOUS at 16:29

## 2021-03-12 RX ADMIN — DEXMEDETOMIDINE HYDROCHLORIDE 10 MCG: 100 INJECTION, SOLUTION INTRAVENOUS at 16:57

## 2021-03-12 RX ADMIN — OCTREOTIDE ACETATE 50 MCG/HR: 500 INJECTION, SOLUTION INTRAVENOUS; SUBCUTANEOUS at 03:13

## 2021-03-12 RX ADMIN — Medication 10 ML: at 20:24

## 2021-03-12 RX ADMIN — DEXMEDETOMIDINE HYDROCHLORIDE 25 MCG: 100 INJECTION, SOLUTION INTRAVENOUS at 16:26

## 2021-03-12 RX ADMIN — SODIUM CHLORIDE 8 MG/HR: 9 INJECTION, SOLUTION INTRAVENOUS at 17:40

## 2021-03-12 RX ADMIN — OCTREOTIDE ACETATE 50 MCG: 100 INJECTION, SOLUTION INTRAVENOUS; SUBCUTANEOUS at 02:13

## 2021-03-12 RX ADMIN — PROPOFOL 10 MG: 10 INJECTION, EMULSION INTRAVENOUS at 16:47

## 2021-03-12 RX ADMIN — PHENYLEPHRINE HYDROCHLORIDE 100 MCG: 10 INJECTION, SOLUTION INTRAMUSCULAR; INTRAVENOUS; SUBCUTANEOUS at 17:13

## 2021-03-12 RX ADMIN — PROPOFOL 20 MG: 10 INJECTION, EMULSION INTRAVENOUS at 16:48

## 2021-03-12 RX ADMIN — SODIUM CHLORIDE 1000 ML: 9 INJECTION, SOLUTION INTRAVENOUS at 03:13

## 2021-03-12 ASSESSMENT — LIFESTYLE VARIABLES: SMOKING_STATUS: 1

## 2021-03-12 ASSESSMENT — ENCOUNTER SYMPTOMS
RESPIRATORY NEGATIVE: 1
ALLERGIC/IMMUNOLOGIC NEGATIVE: 1
EYES NEGATIVE: 1
VOMITING: 1
TACHYPNEA: 1
DIARRHEA: 1
BLOOD IN STOOL: 1
NAUSEA: 1

## 2021-03-12 ASSESSMENT — PAIN DESCRIPTION - PAIN TYPE: TYPE: ACUTE PAIN

## 2021-03-12 ASSESSMENT — COPD QUESTIONNAIRES: CAT_SEVERITY: MODERATE

## 2021-03-12 NOTE — ANESTHESIA PRE PROCEDURE
mcg/hr at 21 0855    glucose (GLUTOSE) 40 % oral gel 15 g  15 g Oral PRN Marah Sierra MD        dextrose 50 % IV solution  12.5 g Intravenous PRN Marah Sierra MD        glucagon (rDNA) injection 1 mg  1 mg Intramuscular PRN Marah Sierra MD        dextrose 5 % solution  100 mL/hr Intravenous PRN Marah Sierra MD        insulin lispro (1 Unit Dial) 0-6 Units  0-6 Units Subcutaneous Q6H Marah Seirra MD   1 Units at 21 1405    [START ON 3/13/2021] cefTRIAXone (ROCEPHIN) 1000 mg IVPB in 50 mL D5W minibag  1,000 mg Intravenous Q24H Marah Sierra MD        0.9 % sodium chloride infusion   Intravenous PRN Marah Sierra MD        pantoprazole (PROTONIX) 80 mg in sodium chloride 0.9 % 100 mL infusion  8 mg/hr Intravenous Continuous Rana Dean Daniels MD 10 mL/hr at 21 0906 8 mg/hr at 21 0906    levothyroxine (SYNTHROID) tablet 50 mcg  50 mcg Oral Daily Marah Sierra MD        polyvinyl alcohol (LIQUIFILM TEARS) 1.4 % ophthalmic solution 1 drop  1 drop Both Eyes PRN Brenda Bertrand MD        0.9 % sodium chloride infusion   Intravenous PRN Brenda Bertrand MD        0.9 % sodium chloride infusion   Intravenous PRN Codi Almendarez MD           Allergies:  No Known Allergies    Problem List:    Patient Active Problem List   Diagnosis Code    CAP (community acquired pneumonia) J18.9    PNA (pneumonia) J18.9    History of tobacco abuse Z87.891    Sepsis ([facility] [address] A41.9    Acute upper GI bleed K92.2    Acute GI bleeding K92.2       Past Medical History:        Diagnosis Date    Pneumonia     Thyroid disease        Past Surgical History:        Procedure Laterality Date    HYSTERECTOMY         Social History:    Social History     Tobacco Use    Smoking status: Former Smoker     Packs/day: 0.25     Quit date:      Years since quittin.2    Smokeless tobacco: Never Used   Substance Use Topics    Alcohol use:  Yes     Alcohol/week: 1.0 standard drinks Types: 1 Glasses of wine per week     Comment: occasionally                                Counseling given: Not Answered      Vital Signs (Current):   Vitals:    03/12/21 1346 03/12/21 1351 03/12/21 1400 03/12/21 1405   BP: 109/69 109/69 117/69 117/69   Pulse: 86 85 87 80   Resp: 25 25 22 18   Temp: 98 °F (36.7 °C) 98 °F (36.7 °C)  98.1 °F (36.7 °C)   TempSrc:  Oral  Oral   SpO2:       Weight:       Height:                                                  BP Readings from Last 3 Encounters:   03/12/21 117/69   03/12/21 104/64   04/04/19 111/67       NPO Status: Time of last liquid consumption: 2300                        Time of last solid consumption: 2300                                                      BMI:   Wt Readings from Last 3 Encounters:   03/12/21 111 lb 5.3 oz (50.5 kg)   03/11/21 110 lb (49.9 kg)   03/30/19 109 lb 12.8 oz (49.8 kg)     Body mass index is 19.11 kg/m².     CBC:   Lab Results   Component Value Date    WBC 8.5 03/12/2021    RBC 2.57 03/12/2021    HGB 6.8 03/12/2021    HCT 21.2 03/12/2021    MCV 82.4 03/12/2021    RDW 18.1 03/12/2021     03/12/2021       CMP:   Lab Results   Component Value Date     03/12/2021    K 4.3 03/12/2021    K 4.1 03/11/2021     03/12/2021    CO2 22 03/12/2021    BUN 22 03/12/2021    CREATININE 0.9 03/12/2021    GFRAA >60 03/12/2021    GFRAA >60 10/28/2010    AGRATIO 0.5 03/30/2019    LABGLOM >60 03/12/2021    GLUCOSE 179 03/12/2021    PROT 5.1 03/12/2021    PROT 7.9 10/28/2010    CALCIUM 7.9 03/12/2021    BILITOT 0.6 03/12/2021    ALKPHOS 92 03/12/2021    AST 23 03/12/2021    ALT 17 03/12/2021       POC Tests:   Recent Labs     03/12/21  1303   POCGLU 156*       Coags:   Lab Results   Component Value Date    PROTIME 13.2 03/12/2021    INR 1.14 03/12/2021    APTT 25.5 03/12/2021       HCG (If Applicable): No results found for: PREGTESTUR, PREGSERUM, HCG, HCGQUANT     ABGs: No results found for: PHART, PO2ART, XVU1LRP, XLS4GIN, BEART, O3JWVJHE Type & Screen (If Applicable):  No results found for: LABABO, LABRH    Drug/Infectious Status (If Applicable):  No results found for: HIV, HEPCAB    COVID-19 Screening (If Applicable):   Lab Results   Component Value Date    COVID19 Not Detected 03/12/2021         Anesthesia Evaluation  Patient summary reviewed and Nursing notes reviewed no history of anesthetic complications:   Airway: Mallampati: I  TM distance: >3 FB   Neck ROM: full  Mouth opening: > = 3 FB Dental:    (+) upper dentures and lower dentures      Pulmonary:   (+) COPD: moderate,  decreased breath sounds,  current smoker (quit 20 yrs ago  40 pk yrs )                           Cardiovascular:  Exercise tolerance: good (>4 METS),       (-) past MI    NYHA Classification: II    Rhythm: regular  Rate: normal           Beta Blocker:  Not on Beta Blocker         Neuro/Psych:               GI/Hepatic/Renal:        (-) GERD      ROS comment: Acute gi bleed:   Has had 2 units prbc  . Endo/Other:                     Abdominal:   (+) scaphoid        Vascular:                                        Anesthesia Plan      MAC     ASA 4 - emergent       Induction: intravenous. MIPS: Prophylactic antiemetics administered. Anesthetic plan and risks discussed with patient. Plan discussed with CRNA.     Attending anesthesiologist reviewed and agrees with DO Kimberlee   3/12/2021

## 2021-03-12 NOTE — ANESTHESIA POSTPROCEDURE EVALUATION
Department of Anesthesiology  Postprocedure Note    Patient: Neri Gomez  MRN: [id number]  YOB: 1945  Date of evaluation: 3/12/2021  Time:  6:25 PM     Procedure Summary     Date: 03/12/21 Room / Location: [facility]    Anesthesia Start: 8822 Anesthesia Stop: 1722    Procedures:       COLONOSCOPY DIAGNOSTIC (N/A )      EGD BAND LIGATION (N/A ) Diagnosis: (GI BLEED)    Surgeons: Jaylan Chambers MD Responsible Provider: Erin Griffin DO    Anesthesia Type: MAC ASA Status: 4 - Emergent          Anesthesia Type: MAC    Edita Phase I: Edita Score: 9    Edita Phase II:      Last vitals: Reviewed and per EMR flowsheets.        Anesthesia Post Evaluation    Patient location during evaluation: PACU  Patient participation: complete - patient participated  Level of consciousness: awake and alert  Pain score: 0  Airway patency: patent  Nausea & Vomiting: no nausea and no vomiting  Complications: no  Cardiovascular status: hemodynamically stable  Respiratory status: acceptable  Hydration status: stable

## 2021-03-12 NOTE — CONSULTS
Hospital Medicine  Consult History & Physical        Chief Complaint: Hematemesis    Date of Service: Pt seen/examined in consultation on 03/11/2021    History Of Present Illness:      67 y.o. female who we are asked to see/evaluate by Rock Carmen MD for medical management of esophageal varices. Patient reported not knowing that she had cirrhosis follows up with trial GI presented to ED due to hematemesis. Patient does report having fatigue that progressively been worsening the past 3 weeks with generalized weakness. Today, Patient reported that the hematemesis started around 12:48 PM and then had another episode of bloody vomiting around 10 PM.  Patient quantified amount of blood when presented with a measured bottle and pointed at the 500 cc viola. Patient reported no known alleviating or exacerbating factor associated with nausea. Patient denied taking any NSAIDs nor that if she had  varices in the past.  Patient otherwise denies any Covid exposures, fever chills, cough, production shortness of breath chest pain abdominal discomfort or dysuria. On chart review: Patient was sent to GI office for elevated liver enzymes initially, further work-up yielded positive for smooth muscle antibodies with pulmonary fibrosis, and primary biliary cholangitis. In the ED: Patient was initially hypotensive responsive to fluids, labs obtained showing hemoglobin 3.3. CT head showing hyperenhancement because of the large bowel representing bleed in addition to multiple gastric varices. I contacted surgery and was notified about the gastric varices requested I called GI. I called GI on call and I spoke with Terrell La. Dr. Tammie Edouard and requested patient to be transferred to a facility that can perform TIPS procedure. I then contacted GI at [facility], [facility]. and spoke with Dr. Stan Simpson who was on board for patient to be transferred to [facility]. I spoke with Dr. Sheri Hill who graciously accepted the patient. Past Medical History:        Diagnosis Date    Pneumonia     Thyroid disease        Past Surgical History:        Procedure Laterality Date    HYSTERECTOMY         Medications Prior to Admission:    Prior to Admission medications    Medication Sig Start Date End Date Taking? Authorizing Provider   vitamin B-12 (CYANOCOBALAMIN) 1000 MCG tablet Take 1,000 mcg by mouth   Yes Historical Provider, MD   levothyroxine (SYNTHROID) 50 MCG tablet TK 1 T PO QD 12/31/18   Historical Provider, MD   vitamin D (CHOLECALCIFEROL) 1000 UNIT TABS tablet Take 1,000 Units by mouth    Historical Provider, MD   guaifenesin-dextromethorphan (ROBITUSSIN DM) 100-10 MG/5ML syrup Take 10 mLs by mouth 3 times daily as needed for Cough. 1/1/12   Marck Ahumada MD   albuterol (PROVENTIL HFA) 108 (90 BASE) MCG/ACT inhaler Inhale 2 puffs into the lungs every 4 hours as needed for Shortness of Breath. 1/1/12   Marck Ahumada MD       Allergies:  Patient has no known allergies. Social History:      The patient currently lives at home    TOBACCO:   reports that she quit smoking about 21 years ago. She smoked 0.25 packs per day. She has never used smokeless tobacco.  ETOH:   reports current alcohol use of about 1.0 standard drinks of alcohol per week. Family History:     Leukemia Father    REVIEW OF SYSTEMS:   Pertinent positives as noted in the HPI. All other systems reviewed and negative. PHYSICAL EXAM PERFORMED:  BP (!) 120/55   Pulse 112   Temp 98.1 °F (36.7 °C) (Oral)   Resp 23   Wt 110 lb (49.9 kg)   SpO2 97%   BMI 19.49 kg/m²   General appearance: Pale frail, malnourished  HEENT: Normal cephalic, atraumatic without obvious deformity. Pupils equal, round,Conjunctivae/corneas pale  Neck: Supple, with full range of motion. No jugular venous distention. Trachea midline. Respiratory:  Normal respiratory effort.  Clear to auscultation, bilaterally without wheezing  Cardiovascular: Tachycardic without murmurs  Abdomen: Soft, non-tender, minimally distended, fluid shift present. Musculoskeletal: No clubbing, cyanosis or edema bilaterally. Skin: Skin color, texture, turgor normal.  No rashes or lesions. Neurologic:  Neurovascularly intact without any focal sensory/motor deficits. Psychiatric: Alert and oriented, thought content appropriate, normal insight  Capillary Refill: Brisk,< 3 seconds   Peripheral Pulses: +2 palpable, equal bilaterally     Labs:     Recent Labs     03/11/21 2319 03/11/21  2357   WBC 15.1*  --    HGB 3.3* 3.2*   HCT 12.4* 11.7*     --      Recent Labs     03/11/21 2319   *   K 4.1      CO2 16*   BUN 24*   CREATININE 1.1   CALCIUM 8.4     No results for input(s): AST, ALT, BILIDIR, BILITOT, ALKPHOS in the last 72 hours. Recent Labs     03/11/21 2319   INR 1.24*     Recent Labs     03/11/21 2319   TROPONINI <0.01       Urinalysis:  No results found for: Sherlie Bashir, BACTERIA, RBCUA, BLOODU, SPECGRAV, GLUCOSEU    Radiology: I have reviewed the radiology reports with the following interpretation:      [facility]   Final Result   1. Progressive hyperenhancement of the mucosa of the large bowel, most   prominent involving the right colon, suggesting oozing of contrast (blood)   from the colonic mucosa. No discrete active site of bleeding is identified. 2. No evidence of aneurysm formation or dissection involving the abdominal   aorta   3. Large volume ascites   4. Abnormal wall thickening throughout the colon, most prominent within the   right and transverse colon. This may be related to the underlying liver   disease. Differential considerations would include infectious or ischemic   changes, versus antibiotic associated colitis. 5. Multiple gallstones. Large amount of pericholecystic fluid is present,   not unexpected for the liver disease and ascites. Cholecystitis is difficult   to exclude   6.  Cirrhotic appearance to the liver, with multiple gastric varices 7.  Critical results were called by Dr. Yeny Ley to [facility] on   8/00/7391 at 20:22. EKG:  I have reviewed the EKG with the following interpretation:    @ekgread@      ASSESSMENT:    Active Hospital Problems    Diagnosis Date Noted    Acute upper GI bleed [K92.2] 03/12/2021   1. Acute upper GI bleed:  Suspected secondary to esophageal bleed   I contacted surgery and was notified about the gastric varices requested I called GI. I called GI on call and I spoke with Bernabe Weber. Dr. Grupo Peck  requested patient to be transferred to a facility that can perform TIPS procedure. I then contacted GI at [facility], [facility]. and spoke with Dr. Patrizia Camarena who was on board for patient to be transferred to [facility]. I spoke with Dr. Aida Pedraza who graciously accepted the patient. COVID-19 tested negative rapid for pre- procedural  Octreotide, Protonix, ceftriaxone given    Acute blood loss anemia:  Secondary to upper GI bleed with lower GI bleed also present evident on CTA  Hypotension responded to fluids and rbc  Surgery consulted, and on board    Leukocytosis:  Likely reactive secondary to acute bleed  Antibiotic empirically given acute GI bleed in the setting of cirrhosis. Acute renal failure:  Baseline 0.6-0.7 currently at 1.1  IVF, RBC    Chronic medical conditions:  Cirrhosis: GI consulted    Dispo -transfer to [facility] for higher level care    Thank you for the consultation, will follow up as needed    Electronically signed by James Cruz DO on 3/12/21 at 3:40 AM EST       Critical care time: 31 minutes      Patient was managed and transferred by myself as requested for assistance from the ED physician given multiple high acuity unstable patients at the same time in the ed.

## 2021-03-12 NOTE — PROCEDURES
EGD and colonoscopy report    Patient:  Kamryn Cooney                  1945    Referring Physician:  Alessia Cotton    Endoscopist: Hermelindo Oneill     Indication:  GI bleed; abnormal CT     Medications:  MAC      Pre-Anesthesia Assessment:  I have reviewed and am in agreement with patient history and medication, including previous response to sedation. Prior to the procedure, a History and Physical was performed, and patient medications and allergies were reviewed. The patient is competent. The risks and benefits of the procedure and the sedation options and risks were discussed with the patient. Risks discussed included but were not limited to infection, bleeding, perforation, death, and missed lesions. All questions were answered and informed consent was obtained. Patient identification and proposed procedure were verified by the physician and the nurse in the pre-procedure area in the procedure room. Mallampatti: II  ASA Grade Assessment: 3     After reviewing the risks and benefits, the patient was deemed in satisfactory condition to undergo the procedure. The anesthesia plan was to use MAC anesthesia. Immediately prior to administration of medications, the patient was re-assessed for adequacy to receive sedatives and a time out was performed. Patient and healthcare providers were in agreement it was the correct patient and procedure. The heart rate, respiratory rate, oxygen saturations, blood pressure, adequacy of pulmonary ventilation, and response to care were monitored throughout the procedure. The physical status of the patient was re-assessed after the procedure. After obtaining informed consent, the endoscope was passed under direct vision. The endoscope was introduced through the mouth, and advanced to the second part of duodenum. The EGD was accomplished without difficulty. The patient tolerated the procedure well.    The duodenum was normal.  The stomach was normal aside from mild portal hypertensive gastropathy. Retroflexion did not show a hiatal hernia. There was a small gastric varix without stigmata of bleeding  The esophagus had grade II varices with red jovanni signs. GE junction at 35cms. No Evidence of Salazar's or esophagitis. I did not want to place bands as I was not sure I would do the colonoscopy and if retched/vomited may knock these off and cause more bleeding. However, she was having copious bleeding from anal canal. I elected to do the colonoscopy. The patient was then positioned for a colonoscopy. A rectal examination was performed and showed hemorrhoids. The pediatric colonoscope was then advanced atraumatically into the rectum and advanced without difficulty to the cecum. The cecum was identified by the appendiceal orifice the ileocecal valve and other normal anatomy and a picture was obtained. The scope was then withdrawn (>6minutes) with close inspection of the mucosa in a circumferential manor. TI without blood for 5cms. No blood in right colon. Edematous pale mucosa throughout the colon. Brown stool without bleeding. Severe diverticulosis of the left colon without bleeding. Retroflexed views of the rectum show small hemorrhoids. No immediate complications. The preparation was poor. Estimated blood loss none    I then went back to the EGD and placed 6 bands starting at the [facility] and working proximal in a circumferential manner.      Findings:  EGD: as above  Colon: as above    Plan:  Discussed with chuy Ledbetter via telephone  Continue octreotide, ppi gtt, and antibiotics  Clears ok  Goal Hgb 8-9gm/dL  Repeat EGD in a few weeks

## 2021-03-12 NOTE — ED NOTES
Called [facility] general surgery @ 0128   Re; Gi bleed   Dr. Ashia Armenta @ [address] [place] [address]  03/12/21 0135

## 2021-03-12 NOTE — CONSULTS
ICU HISTORY AND PHYSICAL  PGY- 1     Hospital Day: 1  ICU Day: 1                                                           Code:Full Code  Admit Date: 3/12/2021  PCP: Violet Valencia MD                                  CC: hematemesis    HISTORYOF PRESENT ILLNESS:   Philippe Aponte is a 66 yo F w/ PMH of liver cirrhosis, PBC, and hypothyroidism who presented to [facility] with hematemesis and melena. She reports experiencing 2 episodes of both red bloody emesis and black diarrhea yesterday around 1300 and 2200. Prior to that she reports 2 weeks of sinus drainage, occasional headaches, fatigue and weakness. She saw a PCP 2 weeks ago, reports she started Z-pack and prednisone but did not complete courses. States she may have taken 2 doses of ibuprofen over the past 2 weeks. Denies AC or ASA use. In October 2019 she was referred to gastroenterologist for elevated alkaline phosphatase, was under treatment for primary biliary cholangitis and also diagnosed with hepatic fibrosis.     At [facility] her labs were significant for Hgb 3.3, WBC 15.1, BUN 24, Lactic Acid 8.8. EKG revealed sinus tachycardia of 115. CT revealed cirrhotic appearance to liver, multiple gastric varices, large volume ascites, findings concerning with oozing of contrast/blood from colonic mucosa with no discrete bleeding site identified, colonic wall thickening possibly due to liver disease or infection or ischemia or colitis, multiple gallstones, and large pericholecystic fluid difficult to r/o cholecystitis. PRB transfusions and octreotide gtt were started. Patient was transferred to [facility], reportedly for TIPS consideration.      Upon arrival to [facility] patient was receiving reportedly her 2nd PRBC with 2 PIVs for access. She was afebrile, hemodynamically stable with BP in 110s/60s and HR in 100s. Her only complaints were dry mouth and eye soreness. She denied any additional emesis or BMs since arriving to [facility].  She denied light-headedness, dizziness, abdominal pain, nausea, CP, SOB. She denied history of IVDU or EtOH abuse. She believed she had been diagnosed with cirrhosis before but was unsure. She denied a history of hepatitis.       PAST HISTORY:     Past Medical History:   Diagnosis Date    Pneumonia     Thyroid disease        Past Surgical History:   Procedure Laterality Date    HYSTERECTOMY         Social History:   The patient lives at nursing home. Alcohol: no use   Illicit drugs: no use  Tobacco: no use     Family History:  No family history on file. MEDICATIONS:     No current facility-administered medications on file prior to encounter. Current Outpatient Medications on File Prior to Encounter   Medication Sig Dispense Refill    levothyroxine (SYNTHROID) 50 MCG tablet TK 1 T PO QD  1    vitamin B-12 (CYANOCOBALAMIN) 1000 MCG tablet Take 1,000 mcg by mouth      vitamin D (CHOLECALCIFEROL) 1000 UNIT TABS tablet Take 1,000 Units by mouth      guaifenesin-dextromethorphan (ROBITUSSIN DM) 100-10 MG/5ML syrup Take 10 mLs by mouth 3 times daily as needed for Cough. 120 mL 0    albuterol (PROVENTIL HFA) 108 (90 BASE) MCG/ACT inhaler Inhale 2 puffs into the lungs every 4 hours as needed for Shortness of Breath.  1 Inhaler 0         Scheduled Meds:   sodium chloride flush  10 mL Intravenous 2 times per day    insulin lispro  0-6 Units Subcutaneous Q6H    [START ON 3/13/2021] cefTRIAXone (ROCEPHIN) IV  1,000 mg Intravenous Q24H    levothyroxine  50 mcg Oral Daily    polyethylene glycol  2,000 mL Oral Once      Continuous Infusions:   octreotide (SANDOSTATIN) infusion 25 mcg/hr (03/12/21 0855)    dextrose      sodium chloride      pantoprozole (PROTONIX) infusion 8 mg/hr (03/12/21 0906)     PRN Meds:sodium chloride flush, promethazine **OR** ondansetron, acetaminophen **OR** acetaminophen, glucose, dextrose, glucagon (rDNA), dextrose, sodium chloride, polyvinyl alcohol    Allergies: No Known Allergies    OF SYSTEMS:       History obtained from chart review and the patient    Review of Systems   Constitutional: Positive for fatigue. HENT: Negative. Eyes: Negative. Respiratory: Negative. Cardiovascular: Negative. Gastrointestinal: Positive for blood in stool, diarrhea, nausea and vomiting. Endocrine: Negative. Genitourinary: Negative. Musculoskeletal: Negative. Skin: Negative. Allergic/Immunologic: Negative. Neurological: Negative. Hematological: Negative. Psychiatric/Behavioral: Negative. PHYSICAL EXAM:       Vitals: /62   Pulse 92   Temp 98.1 °F (36.7 °C) (Oral)   Ht 5' 4\" (1.626 m)   Wt 111 lb 5.3 oz (50.5 kg)   BMI 19.11 kg/m²     I/O:  No intake or output data in the 24 hours ending 03/12/21 0936  No intake/output data recorded. No intake/output data recorded. PhysicalExamination:     Physical Exam  Constitutional:       Appearance: Normal appearance. She is ill-appearing. HENT:      Head: Normocephalic and atraumatic. Cardiovascular:      Rate and Rhythm: Normal rate and regular rhythm. Pulses: Normal pulses. Heart sounds: Normal heart sounds. Pulmonary:      Effort: Pulmonary effort is normal.      Breath sounds: Normal breath sounds. Abdominal:      General: Bowel sounds are normal.      Palpations: Abdomen is soft. Tenderness: There is no abdominal tenderness. Skin:     General: Skin is warm and dry. Capillary Refill: Capillary refill takes less than 2 seconds. Neurological:      General: No focal deficit present. Mental Status: She is alert and oriented to person, place, and time. Mental status is at baseline. Access:   -Central Access Day #: 1                                  -Peripheral Access Day#:1    Vent Settings:   / / /     No results for input(s): PHART, ZHU7FSM, PO2ART in the last 72 hours.         DATA:       Labs:  CBC:   Recent Labs     03/11/21  2319 03/11/21  2357   WBC 15.1*  --    HGB 3.3* 3.2*   HCT 12.4* 11.7*   --        BMP:   Recent Labs     03/11/21 2319   *   K 4.1      CO2 16*   BUN 24*   CREATININE 1.1   GLUCOSE 277*     's: No results for input(s): AST, ALT, ALB, BILITOT, ALKPHOS in the last 72 hours. Troponin:   Recent Labs     03/11/21 2319   TROPONINI <0.01     BNP: No results for input(s): BNP in the last 72 hours.  : No results for input(s): PHART, KPB3JME, PO2ART in the last 72 hours. INR:  Recent Labs     03/11/21 2319   INR 1.24*       U/A:No results for input(s): NITRITE, COLORU, PHUR, LABCAST, WBCUA, RBCUA, MUCUS, TRICHOMONAS, YEAST, BACTERIA, CLARITYU, SPECGRAV, LEUKOCYTESUR, UROBILINOGEN, BILIRUBINUR, BLOODU, GLUCOSEU, AMORPHOUS in the last 72 hours. Invalid input(s): Bryce Yeboah    No orders to display       EKG:   Echo:  Micro:     ASSESSMENT AND PLAN:   Carlos Pang is a 67 y.o. female with a PMH of liver fibrosis, PBC, and hypothyroidism who presented to [facility] with hematemesis and melena.     Acute Blood Loss Anemia, GI Bleed  Liver Cirrhosis, Probably New Decompensation  2 episodes each of red bloody emesis and black diarrha on 3/11. Presentation Hgb 3.3, BUN 24. CT concerning for cirrhosis, gastric varices, ascites, gallstones, pericholecystic fluid, colon wall thickening, possible oozing of blood from colonic mucosa  - Octreotide gtt  - Protonix gtt  - Ceftriaxone SBP Ppx  - NPO  - Transfuse PRBCs #3 and 4 here, goal Hgb 7.0  - q6h H&H  - GI consult; appreciate recs  - EGD planned this afternoon. Colonoscopy tomorrow if esophagogastroduodenoscopy negative  -We will need paracentesis at some point    Thrombocytopenia  -Follow platelets closely  -Potentially from cirrhosis     Lactic Acidosis / AGMA - Likely 2/2 GI Bleed  Lactic Acid 8.8 on presentation to [facility], AG 17  - Trend lactic acid q6h  - Daily BMP     Leukocytosis - Likely 2/2 GI Bleed  WBC 15.1 on presentation to [facility].   8.5 on recheck here  - Daily CBC     MIRANDA - Likely Prerenal 2/2 GI Bleed but monitor closely for hepatorenal syndrome  Cr 1.1, BUN 24 on presentation to [facility]. Baseline Cr 0.6 in April 2019. Repeat creatinine after volume is 0.9  - Daily BMP     Hyperglycemia  Gl[facility] 277 [facility]  - Hgb A1c     Chronic Primary Biliary Cholangits - Takes ursodiol 750 mg daily at home    Hypothyroidism - Continue home synthroid      Code Status:Full Code  FEN: NPO Exceptions: Ice Chips  PPX: SCDs  DISPO: ICU    This patient has been staffed and discussed with Rula Wilkerson MD.  -----------------------------  Kathy Montgomery, PGY-1  [unfilled]  9:36 AM    Pulm/CC    Patient seen and examined. I agree with Dr. Momin Courser history, physical, lab findings, assessment and plan and edited as needed.     Plan discussed with Dr. Shayy Howell of [facility] Bates MD

## 2021-03-12 NOTE — ED NOTES
Called [facility] Gi @ [phone]   Re: large varices  Dr. Wanda Albrecht @  [address]  03/12/21 0222

## 2021-03-12 NOTE — CONSULTS
[address] [place] [address]  GI Consultation                                                                 Patient: Venkata Paez  : 1945       Date:  3/12/2021    Subjective:       History of Present Illness  Patient is a 67 y.o.  female admitted with Acute GI bleeding [K92.2] who is seen in consult for UGIB, varices, decompensated cirrhosis. Patient has a PMH hypothyroidism, RA, primary biliary cholangitis (diagnosed 2019) and seen by GI at [facility] at the same time. She was started on ursodiol and per records, was asked to make appointment for EGD at the time but no reports available. Patient underwent extensive workup at the time and her titers were remarkable for positive COSME, high anti-mitochondrial IgG (105.5 units), high F-actin IgG (26 units). Other workup including Hep B and C, AFP, anti-LKM, anti-ttG, ANCA, anti-Scl70, anti-DNA, anti-smith were negative. Hep A ab was positive in 2019. Apart from this, based on GI notes, patient did not have stigmata of decompensated cirrhosis at the time. She had a RUQ US done in 2019 which showed hepatic findings suggestive of steatosis, hepatitis or cirrhosis, possible punctate GB wall polyps, patent portal veins with normal hepatopedal flow into the liver, patent hepatic veins. She presented to [facility] ED on 3/11 with hematemesis. She reports progressively worsening fatigue over 3 weeks, reports she had 2 episodes of hematemesis yesterday, 500 cc in volume. Reports nausea, does not report taking NSAIDs in the past. All other ROS negative. Initially hypotensive and responsive to IVF. Labs revealed Hgb 3.3, CTA abd pelvis - progressive hyperenhancement of large bowel mucosa in area of R colon, oozing of contrast in area. Large volume ascites, cirrhotic appearance of liver and multiple gastric varices. Transferred to [facility] for consideration of TIPS given concern for variceal bleeding. S/p 2 U PRBC since admission to [facility].  Labs - Hgb 3.2 (baseline 11.1 in 2019), plt 281, INR 1.24, albumin pending, BUN 24, Cr 1.1. LFTs pending. Cr 1.1 from baseline 0.6. Past Medical History:   Diagnosis Date    Pneumonia     Thyroid disease       Past Surgical History:   Procedure Laterality Date    HYSTERECTOMY        Past Endoscopic History none    Admission Meds  No current facility-administered medications on file prior to encounter. Current Outpatient Medications on File Prior to Encounter   Medication Sig Dispense Refill    levothyroxine (SYNTHROID) 50 MCG tablet TK 1 T PO QD  1    vitamin B-12 (CYANOCOBALAMIN) 1000 MCG tablet Take 1,000 mcg by mouth      vitamin D (CHOLECALCIFEROL) 1000 UNIT TABS tablet Take 1,000 Units by mouth      guaifenesin-dextromethorphan (ROBITUSSIN DM) 100-10 MG/5ML syrup Take 10 mLs by mouth 3 times daily as needed for Cough. 120 mL 0    albuterol (PROVENTIL HFA) 108 (90 BASE) MCG/ACT inhaler Inhale 2 puffs into the lungs every 4 hours as needed for Shortness of Breath. 1 Inhaler 0       Patient denies ASA, NSAID use. Allergies  No Known Allergies   Social   Social History     Tobacco Use    Smoking status: Former Smoker     Packs/day: 0.25     Quit date:      Years since quittin.2    Smokeless tobacco: Never Used   Substance Use Topics    Alcohol use: Yes     Alcohol/week: 1.0 standard drinks     Types: 1 Glasses of wine per week     Comment: occasionally        No family history on file. No family history of colon cancer, Crohn's disease, or ulcerative colitis. Review of Systems  Pertinent items are noted in HPI.        Physical Exam    /62   Pulse 92   Temp 98.1 °F (36.7 °C) (Oral)   Ht 5' 4\" (1.626 m)   Wt 111 lb 5.3 oz (50.5 kg)   BMI 19.11 kg/m²   General appearance: alert, cooperative, no distress, cachectic and pale appearing female  Anicteric, No Jaundice  Head: Normocephalic, without obvious abnormality  Lungs: clear to auscultation bilaterally, Normal Effort  Heart: regular rate and rhythm, normal S1 and S2, no murmurs or rubs  Abdomen: soft, NT, distended, has spider angiomata, +BS; +ascites  Extremities: atraumatic, no cyanosis or edema; +deformity in fingers and sclerodactyly  Skin: warm and dry,  + pitting edema  Neuro: intact  AAOX3; no asterixis      Data Review:    Recent Labs     03/11/21 2319 03/11/21  2357   WBC 15.1*  --    HGB 3.3* 3.2*   HCT 12.4* 11.7*   MCV 80.3  --      --      Recent Labs     03/11/21 2319   *   K 4.1      CO2 16*   BUN 24*   CREATININE 1.1     No results for input(s): AST, ALT, ALB, BILIDIR, BILITOT, ALKPHOS in the last 72 hours. No results for input(s): LIPASE, AMYLASE in the last 72 hours. Recent Labs     03/11/21 2319   PROTIME 14.4*   INR 1.24*     No results for input(s): PTT in the last 72 hours. No results for input(s): OCCULTBLD in the last 72 hours. Imaging Studies:              Liver US with doppler 3/12/2021 - pending              CTA of abdomen and pelvis 3/12/2021  1. Progressive hyperenhancement of the mucosa of the large bowel, most  prominent involving the right colon, suggesting oozing of contrast (blood)  from the colonic mucosa.  No discrete active site of bleeding is identified. 2. No evidence of aneurysm formation or dissection involving the abdominal  aorta  3. Large volume ascites  4. Abnormal wall thickening throughout the colon, most prominent within the  right and transverse colon.  This may be related to the underlying liver  disease.  Differential considerations would include infectious or ischemic  changes, versus antibiotic associated colitis. 5. Multiple gallstones.  Large amount of pericholecystic fluid is present,  not unexpected for the liver disease and ascites.  Cholecystitis is difficult  to exclude  6. Cirrhotic appearance to the liver, with multiple gastric varices        Assessment:     Active Problems:    Acute GI bleeding  Resolved Problems:    * No resolved hospital problems.  * Acute blood loss anemia likely 2/2 variceal bleeding (esophageal or gastric) vs colonic bleeding from portal HTN  Decompensated cirrhosis 2/2 primary biliary cholangitis - large volume ascites, GIB, gastric varices  MIRANDA      Recommendations:     Keep NPO  Continue octreotide gtt, protonix gtt  Continue ceftriaxone for GI ppx  Serial H&H  Get liver US with doppler, get AFP to assess for causes of decompensation and ascites apart from decompensated cirrhosis due to underlying PBC  Recommend diagnostic paracentesis to eval for SBP but would hold off until Cr improves lynnette since she has also received contrast for CTA, strict I's & O's   Would hold off on lasix and spironolactone until cr stabilizes and she maintains hemodynamic stability  Plan for EGD and colonoscopy today - consent obtained    Thank you for the opportunity to participate in [address] [place] [address],[address] care.     Abbe Garcia, PGY-2  Internal Medicine Resident    Will discuss with Dr. Wendy Clay

## 2021-03-12 NOTE — H&P
Internal Medicine  Progress Note    Admission Date: 3/12/2021     Chief Complaint: Hematemesis, Melena    History of Present Illness:  Wilfrido Gates is a 66 yo F w/ PMH of liver cirrhosis, PBC, and hypothyroidism who presented to [facility] with hematemesis and melena. She reports experiencing 2 episodes of both red bloody emesis and black diarrhea yesterday around 1300 and 2200. Prior to that she reports 2 weeks of sinus drainage, occasional headaches, fatigue and weakness. She saw a PCP 2 weeks ago, reports she started Z-pack and prednisone but did not complete courses. States she may have taken 2 doses of ibuprofen over the past 2 weeks. Denies AC or ASA use. In October 2019 she was referred to gastroenterologist for elevated alkaline phosphatase, was under treatment for primary biliary cholangitis and also diagnosed with hepatic fibrosis. At [facility] her labs were significant for Hgb 3.3, WBC 15.1, BUN 24, Lactic Acid 8.8. EKG revealed sinus tachycardia of 115. CT revealed cirrhotic appearance to liver, multiple gastric varices, large volume ascites, findings concerning with oozing of contrast/blood from colonic mucosa with no discrete bleeding site identified, colonic wall thickening possibly due to liver disease or infection or ischemia or colitis, multiple gallstones, and large pericholecystic fluid difficult to r/o cholecystitis. PRB transfusions and octreotide gtt were started. Patient was transferred to [facility], reportedly for TIPS consideration. Upon arrival to [facility] patient was receiving reportedly her 2nd PRBC with 2 PIVs for access. She was afebrile, hemodynamically stable with BP in 110s/60s and HR in 100s. Her only complaints were dry mouth and eye soreness. She denied any additional emesis or BMs since arriving to [facility]. She denied light-headedness, dizziness, abdominal pain, nausea, CP, SOB. She denied history of IVDU or EtOH abuse.  She believed she had been diagnosed with cirrhosis before but was unsure. She denied a history of hepatitis. Past Medical History:      Diagnosis Date    Pneumonia     Thyroid disease          Past Surgical History:      Procedure Laterality Date    HYSTERECTOMY           Medications Prior to Admission:  Medications Prior to Admission: levothyroxine (SYNTHROID) 50 MCG tablet, TK 1 T PO QD  vitamin B-12 (CYANOCOBALAMIN) 1000 MCG tablet, Take 1,000 mcg by mouth  vitamin D (CHOLECALCIFEROL) 1000 UNIT TABS tablet, Take 1,000 Units by mouth  guaifenesin-dextromethorphan (ROBITUSSIN DM) 100-10 MG/5ML syrup, Take 10 mLs by mouth 3 times daily as needed for Cough. albuterol (PROVENTIL HFA) 108 (90 BASE) MCG/ACT inhaler, Inhale 2 puffs into the lungs every 4 hours as needed for Shortness of Breath. Allergies:  Patient has no known allergies. Family History:  No family history on file. Social History:  Tobacco:  reports that she quit smoking about 21 years ago. She smoked 0.25 packs per day. She has never used smokeless tobacco.  Alcohol:  reports current alcohol use of about 1.0 standard drinks of alcohol per week. Recreational Drugs: Denies recreational drug use including IVDU. Denies heavy EtOH use. Review of Systems:  Constitutional: Negative for fever and chills. HEENT: Negative for vision changes, hearing changes, and swallowing changes. Respiratory: Negative for shortness of breath. Cardiovascular: Negative for chest pain. Gastrointestinal: Negative for nausea, abdominal pain. Genitourinary: Negative for dysuria. Musculoskeletal: Negative for arthralgias. Skin: Negative for rash. Neurological: Negative for light-headedness. Vitals:    03/12/21 0600   BP: 111/62   Pulse: 92   Temp: 98.1 °F (36.7 °C)   TempSrc: Oral   Weight: 111 lb 5.3 oz (50.5 kg)   Height: 5' 4\" (1.626 m)     Physical Exam:    Constitutional: Awake. Well-developed and well-nourished. No acute distress. HEENT: Normocephalic and atraumatic. No obvious scleral icterus. PRBCs, goal Hgb 7.0  - q6h H&H  - Stat GI Consult    Lactic Acidosis / AGMA - Likely 2/2 GI Bleed  Lactic Acid 8.8 on presentation to [facility], AG 17  - Trend lactic acid q6h  - Daily BMP    Leukocytosis - Likely 2/2 GI Bleed  WBC 15.1 on presentation to [facility]  - Daily CBC    MIRANDA - Likely Prerenal 2/2 GI Bleed  Cr 1.1, BUN 24 on presentation to [facility]. Baseline Cr 0.6 in April 2019.  - Daily BMP    Hyperglycemia  Glucose 277 on presentation to [address] [place] [address]  - Hgb A1c    Chronic Primary Biliary Cholangits - Takes ursodiol 750 mg daily at home  Hypothyroidism - Continue home synthroid    Case discussed with attending physician Dr. Talisha Thompson.       Code Status: Full Code  FEN: Diet NPO Effective Now Exceptions are: Ice Chips  PPX: SCDs  DISPO: F    Karin Ferrara MD PGY-1  3/12/2021, 8:16 AM

## 2021-03-12 NOTE — ED PROVIDER NOTES
Emergency Department Encounter    Patient: Fabiano Barton  MRN: [id number]  : 1945  Date of Evaluation: 3/12/2021  ED Provider:  Julianne Veras    Triage Chief Complaint:   Hematemesis (vomiting dark blood and dark stool) and Diarrhea    [place]:  Fabiano Barton is a 67 y.o. female that presents for a wean of Subutex with marked tachycardia skin paleness positive protuberant abdomen with mild hepatosplenomegaly positive active fluid in the setting of acute GI bleed with hematemesis and melena with positive tachypnea no severe rebound or guarding positive tachycardia with significant sign of poor perfusion    ROS - see HPI, below listed is current ROS at time of my eval:  General:  No fevers, no chills, no weakness  Eyes:  no discharge  ENT:  No sore throat, no nasal congestion  Cardiovascular:  No chest pain, no palpitations  Respiratory:  No shortness of breath, no cough, no wheezing  Gastrointestinal:  + pain, + nausea, + vomiting, no diarrhea, +hematemesis, melena  Musculoskeletal:  No muscle pain, no joint pain  Skin:  No rash, no pruritis  Neurologic:  no headache  Genitourinary:  No dysuria, no hematuria  Endocrine:  No unexpected weight gain, no unexpected weight loss  Extremities:  no edema, no pain    Past Medical History:   Diagnosis Date    Pneumonia     Thyroid disease      Past Surgical History:   Procedure Laterality Date    HYSTERECTOMY       History reviewed. No pertinent family history.   Social History     Socioeconomic History    Marital status:      Spouse name: Not on file    Number of children: Not on file    Years of education: Not on file    Highest education level: Not on file   Occupational History    Not on file   Social Needs    Financial resource strain: Not on file    Food insecurity     Worry: Not on file     Inability: Not on file    Transportation needs     Medical: Not on file     Non-medical: Not on file   Tobacco Use    Smoking status: Former Smoker Packs/day: 0.25     Quit date:      Years since quittin.2    Smokeless tobacco: Never Used   Substance and Sexual Activity    Alcohol use: Yes     Alcohol/week: 1.0 standard drinks     Types: 1 Glasses of wine per week     Comment: occasionally    Drug use: Never    Sexual activity: Not on file   Lifestyle    Physical activity     Days per week: Not on file     Minutes per session: Not on file    Stress: Not on file   Relationships    Social connections     Talks on phone: Not on file     Gets together: Not on file     Attends L[employer] service: Not on file     Active member of club or organization: Not on file     Attends meetings of clubs or organizations: Not on file     Relationship status: Not on file    Intimate partner violence     Fear of current or ex partner: Not on file     Emotionally abused: Not on file     Physically abused: Not on file     Forced sexual activity: Not on file   Other Topics Concern    Not on file   Social History Narrative    Not on file     No current facility-administered medications for this encounter. No current outpatient medications on file.      Facility-Administered Medications Ordered in Other Encounters   Medication Dose Route Frequency Provider Last Rate Last Admin    sodium chloride flush 0.9 % injection 10 mL  10 mL Intravenous 2 times per day Davy Almendarez MD        sodium chloride flush 0.9 % injection 10 mL  10 mL Intravenous PRN Davy Almendarez MD        promethazine (PHENERGAN) tablet 12.5 mg  12.5 mg Oral Q6H PRN Davy Almendarez MD        Or    ondansetron [facility]) injection 4 mg  4 mg Intravenous Q6H PRN Davy Almendarez MD        acetaminophen (TYLENOL) tablet 650 mg  650 mg Oral Q6H PRN Davy Almendarez MD        Or    acetaminophen (TYLENOL) suppository 650 mg  650 mg Rectal Q6H PRN Davy Almendarez MD        octreotide (SANDOSTATIN) 500 mcg in sodium chloride 0.9 % 100 mL infusion  25 mcg/hr Intravenous Continuous (L) 136 - 145 mmol/L    Potassium reflex Magnesium 4.1 3.5 - 5.1 mmol/L    Chloride 101 99 - 110 mmol/L    CO2 16 (L) 21 - 32 mmol/L    Anion Gap 17 (H) 3 - 16    Glucose 277 (H) 70 - 99 mg/dL    BUN 24 (H) 7 - 20 mg/dL    CREATININE 1.1 0.6 - 1.2 mg/dL    GFR Non- 48 (A) >60    GFR  58 (A) >60    Calcium 8.4 8.3 - 10.6 mg/dL   Troponin   Result Value Ref Range    Troponin <0.01 <0.01 ng/mL   Blood Gas, Venous   Result Value Ref Range    pH, Sergio 7.471 (H) 7.350 - 7.450    pCO2, Sergio 18.9 (L) 40.0 - 50.0 mmHg    pO2, Sergio 209.4 (H) 25 - 40 mmHg    HCO3, Venous 13.5 (L) 23.0 - 29.0 mmol/L    Base Excess, Sergio -9.8 (L) -3.0 - 3.0 mmol/L    O2 Sat, Sergio 99 Not Established %    Carboxyhemoglobin 12.2 (H) 0.0 - 1.5 %    MetHgb, Sergio 0.4 <1.5 %    TC02 (Calc), Sergio 14 Not Established mmol/L    O2 Content, Sergio 5 Not Established VOL %    O2 Therapy Unknown    Lactate, Sepsis   Result Value Ref Range    Lactic Acid, Sepsis 8.8 (HH) 0.4 - 1.9 mmol/L   Protime-INR   Result Value Ref Range    Protime 14.4 (H) 10.0 - 13.2 sec    INR 1.24 (H) 0.86 - 1.14   Hemoglobin and hematocrit, blood   Result Value Ref Range    Hemoglobin 3.2 (LL) 12.0 - 16.0 g/dL    Hematocrit 11.7 (LL) 36.0 - 48.0 %    Path Consult No    Blood Smear Review   Result Value Ref Range    Path Consult Reviewed    EKG 12 Lead   Result Value Ref Range    Ventricular Rate 115 BPM    Atrial Rate 115 BPM    P-R Interval 128 ms    QRS Duration 76 ms    Q-T Interval 336 ms    QTc Calculation (Bazett) 464 ms    P Axis 80 degrees    R Axis 83 degrees    T Axis 62 degrees    Diagnosis       Sinus tachycardiaNonspecific ST & T wave changesAbnormal ECGWhen compared with ECG of 30-MAR-2019 16:39,Non-specific change in ST segment in Anterior leadsConfirmed by Christa Harley MD, Lily Villanueva ([id number]) on 3/12/2021 12:02:03 PM   PREPARE RBC (CROSSMATCH), 3 Units   Result Value Ref Range    Product Code Blood Bank [id number]     Description Blood Bank Red Blood bilaterally. Gastric varices are present. No active contrast extravasation into the stomach, small or large bowel is present to suggest active GI bleeding. Liver is cirrhotic in appearance. Spleen is normal in size. Cholelithiasis is present. Pericholecystic fluid is present, likely related to the underlying liver disease. The adrenal glands and pancreas appear normal.  No intrarenal calculi or hydronephrosis is present. Moderate size hiatal hernia is present. Small bowel demonstrates no evidence of obstruction. Edematous thickening of the large bowel is present, likely related to the underlying liver disease. Differential considerations would include infectious or antibiotic associated colitis. The delayed images demonstrate increasing mucosal hyperenhancement throughout the colon, most prominent within the right colon. The progressive increased attenuation within the mucosa suggest slow oozing of contrast from the mucosa. No pneumatosis or free air is present. Scattered colonic diverticula are present without evidence of diverticulitis. The appendix is normal. CTA PELVIS: Normal enhancement of the aortic bifurcation, common iliac, internal and external iliac  arteries, and femoral arteries is noted bilaterally. No aneurysm formation or dissection is present. Urinary bladder appears normal. Uterus is surgically absent. Large amount of ascites is present within the abdomen and pelvis. No adenopathy is present. No acute osseous abnormality is present. 1. Progressive hyperenhancement of the mucosa of the large bowel, most prominent involving the right colon, suggesting oozing of contrast (blood) from the colonic mucosa. No discrete active site of bleeding is identified. 2. No evidence of aneurysm formation or dissection involving the abdominal aorta 3. Large volume ascites 4. Abnormal wall thickening throughout the colon, most prominent within the right and transverse colon.   This may be related to the underlying liver disease. Differential considerations would include infectious or ischemic changes, versus antibiotic associated colitis. 5. Multiple gallstones. Large amount of pericholecystic fluid is present, not unexpected for the liver disease and ascites. Cholecystitis is difficult to exclude 6. Cirrhotic appearance to the liver, with multiple gastric varices 7. Critical results were called by Dr. Priscilla Ponce to Michelle Joseph on 8/25/0985 at 20:22. Egd    Result Date: 3/12/2021  No dictation     Colonoscopy    Result Date: 3/12/2021  No dictation       EKG (if obtained): (All EKG's are interpreted by myself in the absence of a cardiologist)      MDM:  Patient's ER for evaluation of tachycardia, with significant lactic acidosis severe life-threatening anemia with a hemoglobin of 3, + of bleeding from the right colon, consultation with the hospitalist transfer request, GI, interventional radiology, surgery were all performed. Patient has been possible so rate is 80 active signs of ileus is absent of the right colon with no extravasation and satellitosis and signs of possible organ ischemia. The patient \"blood transfusion consent was obtained protrusion. Patient referred to otology for the need for possible ideology in conjunction with neurology and the ICU placed. Total critical care time provided today was 70  minutes. This excludes seperately billable procedures and family discussion time. Critical care time provided for obtaining history, conducting a physical exam, performing and monitoring interventions, ordering, collecting and interpreting tests, and establishing medical decision-making. There was a potential for life/limb threatening pathology requiring close evaluation and intervention with concern for patient decompensation. Clinical Impression:  1. Gastrointestinal hemorrhage, unspecified gastrointestinal hemorrhage type    2.  Iron deficiency anemia due to chronic blood loss    3. Acute hemorrhage      Disposition referral (if applicable):  No follow-up provider specified. Disposition medications (if applicable):  Discharge Medication List as of 3/12/2021  5:03 AM          Comment: Please note this report has been produced using speech recognition software and may contain errors related to that system including errors in grammar, punctuation, and spelling, as well as words and phrases that may be inappropriate. Efforts were made to edit the dictations.       Brittney Lopez MD  04/20/22 0087

## 2021-03-12 NOTE — PROCEDURES
Marlo Chaidez is a 67 y.o. female patient. No diagnosis found. Past Medical History:   Diagnosis Date    Pneumonia     Thyroid disease      Blood pressure 124/60, pulse 88, temperature 98.1 °F (36.7 °C), temperature source Oral, resp. rate 25, height 5' 4\" (1.626 m), weight 111 lb 5.3 oz (50.5 kg), SpO2 (!) 85 %. Central Line    Date/Time: 3/12/2021 11:00 AM  Performed by: Loren Richmond MD  Authorized by: Loren Richmond MD   Consent: Verbal consent obtained. Risks and benefits: risks, benefits and alternatives were discussed  Consent given by: patient  Patient understanding: patient states understanding of the procedure being performed  Patient consent: the patient's understanding of the procedure matches consent given  Procedure consent: procedure consent matches procedure scheduled  Relevant documents: relevant documents present and verified  Test results: test results available and properly labeled  Site marked: the operative site was marked  Imaging studies: imaging studies available  Required items: required blood products, implants, devices, and special equipment available  Patient identity confirmed: verbally with patient, arm band and hospital-assigned identification number  Time out: Immediately prior to procedure a \"time out\" was called to verify the correct patient, procedure, equipment, support staff and site/side marked as required.   Indications: vascular access  Anesthesia: local infiltration    Anesthesia:  Local Anesthetic: lidocaine 1% without epinephrine    Sedation:  Patient sedated: no    Preparation: skin prepped with 2% chlorhexidine and skin prepped with ChloraPrep  Skin prep agent dried: skin prep agent completely dried prior to procedure  Sterile barriers: all five maximum sterile barriers used - cap, mask, sterile gown, sterile gloves, and large sterile sheet  Hand hygiene: hand hygiene performed prior to central venous catheter insertion  Location details: right internal

## 2021-03-12 NOTE — PLAN OF CARE
Patient seen and examined independently. Patient discussed with Dr. Nicolle Ridley. I agree with assessment and plan. In brief patient presented to [facility] with vomiting large amount of hematemesis approximately 500 cc and having dark stools. She mentioned that she vomited red blood x2. He denies any significant abdominal pain. She denies any chest pain. At [facility] blood work came up with a hemoglobin of 3.3 with MCV of 80.3 along with bun of 24 and lactate of 8.8. Patient relatively stable. Scan done there was some concern that might have bleeding from the right colon along with large volume ascites with cirrhotic liver and multiple gastric varices. Patient does give history of dark stools couple of times before 2. Off note patient does have history of primary sclerosing cholangitis does follow-up with GI at [facility]. Last visit was in 2019.     On evaluation patient hemodynamically stable pleasant denies any lightheadedness pallor present getting second unit of blood heart rate in 90s abdomen soft on Protonix and octreotide drip      Impression    Acute blood loss anemia secondary to GI bleed probably upper GI cannot rule out lower GI based on CT scan finding  Given concern of variceal bleeding octreotide drip along with Protonix drip, prophylactic antibiotics, hemoglobin above 7  Given low BUN given relatively asymptomatic I believe that patient's baseline hemoglobin probably will be around 6 or 7  GI consult for endoscopy and colonoscopy      Liver cirrhosis probably related to [facility]  GI consult    Lactic acidosis  Probably related to hypoperfusion, trend    Leukocytosis  Cover with Rocephin for SBP prophylaxis    SCDs for DVT prophylaxis  Critical care time spent about 35 minutes

## 2021-03-13 LAB
ALBUMIN FLUID: 0.3 G/DL
ALBUMIN SERPL-MCNC: 2.7 G/DL (ref 3.4–5)
ALP BLD-CCNC: 85 U/L (ref 40–129)
ALT SERPL-CCNC: 19 U/L (ref 10–40)
ANION GAP SERPL CALCULATED.3IONS-SCNC: 9 MMOL/L (ref 3–16)
APPEARANCE FLUID: CLEAR
AST SERPL-CCNC: 32 U/L (ref 15–37)
BASOPHILS ABSOLUTE: 0.1 K/UL (ref 0–0.2)
BASOPHILS RELATIVE PERCENT: 0.8 %
BILIRUB SERPL-MCNC: 0.6 MG/DL (ref 0–1)
BILIRUBIN DIRECT: <0.2 MG/DL (ref 0–0.3)
BILIRUBIN, INDIRECT: ABNORMAL MG/DL (ref 0–1)
BUN BLDV-MCNC: 18 MG/DL (ref 7–20)
CALCIUM SERPL-MCNC: 7.8 MG/DL (ref 8.3–10.6)
CELL COUNT FLUID TYPE: NORMAL
CHLORIDE BLD-SCNC: 110 MMOL/L (ref 99–110)
CLOT EVALUATION: NORMAL
CO2: 20 MMOL/L (ref 21–32)
COLOR FLUID: NORMAL
CREAT SERPL-MCNC: 0.9 MG/DL (ref 0.6–1.2)
EOSINOPHIL FLUID: 1 %
EOSINOPHILS ABSOLUTE: 0.3 K/UL (ref 0–0.6)
EOSINOPHILS RELATIVE PERCENT: 4.4 %
ESTIMATED AVERAGE GLUCOSE: 105.4 MG/DL
FERRITIN: 19.6 NG/ML (ref 15–150)
FLUID TYPE: NORMAL
GFR AFRICAN AMERICAN: >60
GFR NON-AFRICAN AMERICAN: >60
GLUCOSE BLD-MCNC: 113 MG/DL (ref 70–99)
GLUCOSE BLD-MCNC: 122 MG/DL (ref 70–99)
GLUCOSE BLD-MCNC: 164 MG/DL (ref 70–99)
GLUCOSE BLD-MCNC: 268 MG/DL (ref 70–99)
GLUCOSE, FLUID: 234 MG/DL
HBA1C MFR BLD: 5.3 %
HCT VFR BLD CALC: 27.1 % (ref 36–48)
HCT VFR BLD CALC: 27.7 % (ref 36–48)
HCT VFR BLD CALC: 31 % (ref 36–48)
HCT VFR BLD CALC: 31.9 % (ref 36–48)
HEMOGLOBIN: 10.1 G/DL (ref 12–16)
HEMOGLOBIN: 10.6 G/DL (ref 12–16)
HEMOGLOBIN: 9 G/DL (ref 12–16)
HEMOGLOBIN: 9.2 G/DL (ref 12–16)
LD, FLUID: 40 U/L
LYMPHOCYTES ABSOLUTE: 1.1 K/UL (ref 1–5.1)
LYMPHOCYTES RELATIVE PERCENT: 13.7 %
LYMPHOCYTES, BODY FLUID: 40 %
MAGNESIUM: 1.8 MG/DL (ref 1.8–2.4)
MCH RBC QN AUTO: 27.6 PG (ref 26–34)
MCHC RBC AUTO-ENTMCNC: 33 G/DL (ref 31–36)
MCV RBC AUTO: 83.5 FL (ref 80–100)
MESOTHELIAL FLUID: 1 %
MONOCYTE, FLUID: 47 %
MONOCYTES ABSOLUTE: 0.5 K/UL (ref 0–1.3)
MONOCYTES RELATIVE PERCENT: 6.1 %
NEUTROPHIL, FLUID: 11 %
NEUTROPHILS ABSOLUTE: 6 K/UL (ref 1.7–7.7)
NEUTROPHILS RELATIVE PERCENT: 75 %
NUCLEATED CELLS FLUID: 148 /CUMM
NUMBER OF CELLS COUNTED FLUID: 100
PDW BLD-RTO: 16.4 % (ref 12.4–15.4)
PERFORMED ON: ABNORMAL
PLATELET # BLD: 99 K/UL (ref 135–450)
PLATELET SLIDE REVIEW: ABNORMAL
PMV BLD AUTO: 8.2 FL (ref 5–10.5)
POTASSIUM SERPL-SCNC: 3.8 MMOL/L (ref 3.5–5.1)
PROTEIN FLUID: 0.5 G/DL
RBC # BLD: 3.32 M/UL (ref 4–5.2)
RBC FLUID: 253 /CUMM
SODIUM BLD-SCNC: 139 MMOL/L (ref 136–145)
TOTAL PROTEIN: 5.4 G/DL (ref 6.4–8.2)
WBC # BLD: 8 K/UL (ref 4–11)

## 2021-03-13 PROCEDURE — 87070 CULTURE OTHR SPECIMN AEROBIC: CPT

## 2021-03-13 PROCEDURE — 2580000003 HC RX 258: Performed by: STUDENT IN AN ORGANIZED HEALTH CARE EDUCATION/TRAINING PROGRAM

## 2021-03-13 PROCEDURE — 85025 COMPLETE CBC W/AUTO DIFF WBC: CPT

## 2021-03-13 PROCEDURE — 82042 OTHER SOURCE ALBUMIN QUAN EA: CPT

## 2021-03-13 PROCEDURE — 82105 ALPHA-FETOPROTEIN SERUM: CPT

## 2021-03-13 PROCEDURE — 84157 ASSAY OF PROTEIN OTHER: CPT

## 2021-03-13 PROCEDURE — 90740 HEPB VACC 3 DOSE IMMUNSUP IM: CPT | Performed by: INTERNAL MEDICINE

## 2021-03-13 PROCEDURE — 36415 COLL VENOUS BLD VENIPUNCTURE: CPT

## 2021-03-13 PROCEDURE — 6370000000 HC RX 637 (ALT 250 FOR IP): Performed by: INTERNAL MEDICINE

## 2021-03-13 PROCEDURE — 6360000002 HC RX W HCPCS: Performed by: INTERNAL MEDICINE

## 2021-03-13 PROCEDURE — 80048 BASIC METABOLIC PNL TOTAL CA: CPT

## 2021-03-13 PROCEDURE — 36592 COLLECT BLOOD FROM PICC: CPT

## 2021-03-13 PROCEDURE — 83615 LACTATE (LD) (LDH) ENZYME: CPT

## 2021-03-13 PROCEDURE — 80076 HEPATIC FUNCTION PANEL: CPT

## 2021-03-13 PROCEDURE — 49083 ABD PARACENTESIS W/IMAGING: CPT

## 2021-03-13 PROCEDURE — G0010 ADMIN HEPATITIS B VACCINE: HCPCS | Performed by: INTERNAL MEDICINE

## 2021-03-13 PROCEDURE — 87205 SMEAR GRAM STAIN: CPT

## 2021-03-13 PROCEDURE — 89051 BODY FLUID CELL COUNT: CPT

## 2021-03-13 PROCEDURE — 6370000000 HC RX 637 (ALT 250 FOR IP): Performed by: STUDENT IN AN ORGANIZED HEALTH CARE EDUCATION/TRAINING PROGRAM

## 2021-03-13 PROCEDURE — 2000000000 HC ICU R&B

## 2021-03-13 PROCEDURE — 82945 GLUCOSE OTHER FLUID: CPT

## 2021-03-13 PROCEDURE — 99233 SBSQ HOSP IP/OBS HIGH 50: CPT | Performed by: INTERNAL MEDICINE

## 2021-03-13 PROCEDURE — 85018 HEMOGLOBIN: CPT

## 2021-03-13 PROCEDURE — 0W9G3ZZ DRAINAGE OF PERITONEAL CAVITY, PERCUTANEOUS APPROACH: ICD-10-PCS | Performed by: STUDENT IN AN ORGANIZED HEALTH CARE EDUCATION/TRAINING PROGRAM

## 2021-03-13 PROCEDURE — 2580000003 HC RX 258: Performed by: INTERNAL MEDICINE

## 2021-03-13 PROCEDURE — 82728 ASSAY OF FERRITIN: CPT

## 2021-03-13 PROCEDURE — 83735 ASSAY OF MAGNESIUM: CPT

## 2021-03-13 PROCEDURE — C9113 INJ PANTOPRAZOLE SODIUM, VIA: HCPCS | Performed by: INTERNAL MEDICINE

## 2021-03-13 PROCEDURE — 6360000002 HC RX W HCPCS: Performed by: STUDENT IN AN ORGANIZED HEALTH CARE EDUCATION/TRAINING PROGRAM

## 2021-03-13 PROCEDURE — 85014 HEMATOCRIT: CPT

## 2021-03-13 RX ORDER — NADOLOL 40 MG/1
10 TABLET ORAL NIGHTLY
Status: DISCONTINUED | OUTPATIENT
Start: 2021-03-13 | End: 2021-03-14

## 2021-03-13 RX ORDER — SPIRONOLACTONE 100 MG/1
100 TABLET, FILM COATED ORAL DAILY
Status: DISCONTINUED | OUTPATIENT
Start: 2021-03-13 | End: 2021-03-14

## 2021-03-13 RX ORDER — SODIUM CHLORIDE 0.9 % (FLUSH) 0.9 %
10 SYRINGE (ML) INJECTION EVERY 12 HOURS SCHEDULED
Status: DISCONTINUED | OUTPATIENT
Start: 2021-03-13 | End: 2021-03-15 | Stop reason: HOSPADM

## 2021-03-13 RX ORDER — FUROSEMIDE 40 MG/1
40 TABLET ORAL DAILY
Status: DISCONTINUED | OUTPATIENT
Start: 2021-03-13 | End: 2021-03-14

## 2021-03-13 RX ORDER — SODIUM CHLORIDE 0.9 % (FLUSH) 0.9 %
10 SYRINGE (ML) INJECTION PRN
Status: DISCONTINUED | OUTPATIENT
Start: 2021-03-13 | End: 2021-03-15 | Stop reason: HOSPADM

## 2021-03-13 RX ADMIN — RIFAXIMIN 550 MG: 550 TABLET ORAL at 09:15

## 2021-03-13 RX ADMIN — RIFAXIMIN 550 MG: 550 TABLET ORAL at 21:11

## 2021-03-13 RX ADMIN — OCTREOTIDE ACETATE 25 MCG/HR: 500 INJECTION, SOLUTION INTRAVENOUS; SUBCUTANEOUS at 04:25

## 2021-03-13 RX ADMIN — Medication 10 ML: at 21:12

## 2021-03-13 RX ADMIN — HEPATITIS B VACCINE (RECOMBINANT) 1 ML: 20 INJECTION, SUSPENSION INTRAMUSCULAR at 13:25

## 2021-03-13 RX ADMIN — Medication 10 ML: at 21:11

## 2021-03-13 RX ADMIN — SPIRONOLACTONE 100 MG: 100 TABLET ORAL at 09:15

## 2021-03-13 RX ADMIN — FUROSEMIDE 40 MG: 40 TABLET ORAL at 09:15

## 2021-03-13 RX ADMIN — SODIUM CHLORIDE 8 MG/HR: 9 INJECTION, SOLUTION INTRAVENOUS at 23:20

## 2021-03-13 RX ADMIN — NADOLOL 10 MG: 40 TABLET ORAL at 21:11

## 2021-03-13 RX ADMIN — LEVOTHYROXINE SODIUM 50 MCG: 50 TABLET ORAL at 06:27

## 2021-03-13 RX ADMIN — SODIUM CHLORIDE 8 MG/HR: 9 INJECTION, SOLUTION INTRAVENOUS at 04:25

## 2021-03-13 RX ADMIN — Medication 10 ML: at 09:16

## 2021-03-13 RX ADMIN — SODIUM CHLORIDE 8 MG/HR: 9 INJECTION, SOLUTION INTRAVENOUS at 13:24

## 2021-03-13 RX ADMIN — CEFTRIAXONE 1000 MG: 1 INJECTION, POWDER, FOR SOLUTION INTRAMUSCULAR; INTRAVENOUS at 02:57

## 2021-03-13 ASSESSMENT — ENCOUNTER SYMPTOMS
TROUBLE SWALLOWING: 0
WHEEZING: 0
DIARRHEA: 1
SINUS PAIN: 0
VOMITING: 0
CONSTIPATION: 0
ABDOMINAL PAIN: 1
SHORTNESS OF BREATH: 0
SORE THROAT: 0
NAUSEA: 0
PHOTOPHOBIA: 0
COUGH: 0
ABDOMINAL DISTENTION: 1
SINUS PRESSURE: 0
CHEST TIGHTNESS: 0
BACK PAIN: 0

## 2021-03-13 NOTE — PLAN OF CARE
Problem:  Bowel Function - Altered:  Goal: Absence of new skin breakdown  Description: Absence of new skin breakdown  Outcome: Ongoing   Mepilex applied

## 2021-03-13 NOTE — PROGRESS NOTES
GI Progress Note      Piyush De Leon is a 67 y.o. female patient. 1. Acute GI bleeding    2.  Acute upper GI bleed        Admit Date: 3/12/2021    Subjective:       No bleeding overnight; no abdominal pain; some chest discomfort after banding      ROS:  As per above    Scheduled Meds:   sodium chloride flush  10 mL Intravenous 2 times per day    insulin lispro  0-6 Units Subcutaneous Q6H    cefTRIAXone (ROCEPHIN) IV  1,000 mg Intravenous Q24H    levothyroxine  50 mcg Oral Daily       Continuous Infusions:   octreotide (SANDOSTATIN) infusion 25 mcg/hr (03/13/21 0425)    dextrose      sodium chloride      pantoprozole (PROTONIX) infusion 8 mg/hr (03/13/21 0425)    sodium chloride      sodium chloride         PRN Meds:  sodium chloride flush, promethazine **OR** ondansetron, acetaminophen **OR** acetaminophen, glucose, dextrose, glucagon (rDNA), dextrose, sodium chloride, polyvinyl alcohol, sodium chloride, sodium chloride      Objective:       Patient Vitals for the past 24 hrs:   BP Temp Temp src Pulse Resp SpO2 Weight   03/13/21 0700 112/76   69 27 96 %    03/13/21 0630      95 % 114 lb 13.8 oz (52.1 kg)   03/13/21 0600 116/68   74 18 92 %    03/13/21 0500 118/68   76 20 95 %    03/13/21 0400 113/65 98 °F (36.7 °C) Oral 63 15 94 %    03/13/21 0200 109/64   65 22 94 %    03/13/21 0130 113/65   68 19 92 %    03/13/21 0100 115/65   76 21 95 %    03/13/21 0030 111/61   63 18 94 %    03/13/21 0000 98/60 97.6 °F (36.4 °C) Axillary 61 14 96 %    03/12/21 2330 (!) 90/56   62 13 97 %    03/12/21 2300 101/62   67 14 94 %    03/12/21 2252 100/63 97.9 °F (36.6 °C) Axillary 72 16 98 %    03/12/21 2245 95/61   68 13 97 %    03/12/21 2230 (!) 106/56   68 15 96 %    03/12/21 2215 (!) 102/59   67 17 94 %    03/12/21 2200 101/63 97.7 °F (36.5 °C) Axillary 72 22 98 %    03/12/21 2145 (!) 98/58   66 18     03/12/21 2130 97/65   73 21     03/12/21 2120 (!) 99/55 97.6 °F tenderness/mass/nodules  CVS:  RRR, Nl s1s2  Lungs CTA Bilaterally, normal effort  Abdomen: soft, non-tender; bowel sounds normal; no masses,  no organomegaly  AAOx3, No asterixis or encephalopathy  Extremities: No edema. Recent Labs     03/11/21 2319 03/11/21 2319 03/12/21  1125 03/12/21  1745 03/13/21  0015   WBC 15.1*  --  8.5  --   --    HGB 3.3*   < > 6.8* 6.8* 9.0*   HCT 12.4*   < > 21.2* 21.1* 27.1*   MCV 80.3  --  82.4  --   --      --  130*  --   --     < > = values in this interval not displayed. Recent Labs     03/11/21 2319 03/12/21 1125 03/13/21  0502   * 135* 139   K 4.1 4.3 3.8    106 110   CO2 16* 22 20*   BUN 24* 22* 18   CREATININE 1.1 0.9 0.9     Recent Labs     03/12/21 1125 03/13/21  0502   AST 23 32   ALT 17 19   BILIDIR <0.2 <0.2   BILITOT 0.6 0.6   ALKPHOS 92 85     No results for input(s): LIPASE, AMYLASE in the last 72 hours. Recent Labs     03/11/21 2319 03/12/21  1010 03/12/21 1125 03/13/21  0502   PROT  --   --  5.1* 5.4*   INR 1.24* 1.14  --   --      No results for input(s): PTT in the last 72 hours. No results for input(s): OCCULTBLD in the last 72 hours. Assessment:       PBC cirrhosis  Ascites  Portal hypertension  Varices  Anemia    Recommendations:       Antibiotics x 7 days  Octreotide x 5 days; while CTA showed colon bleed no source identified and had red jovanni signs on the esophageal varices.  Treat as if these bled; ppi gtt x 3 days  Start low dose nadolol at night; repeat EGD in a few weeks  Advance to soft low sodium diet  Low dose diuretics and monitor renal function  xifaxan  Immune to Hep A; start immunization to Hep B  Check AFP  Start fat soluble vitamins  Outpatient DEXA  Diagnostic paracentesis today recommended    [facility]  3/13/2021  8:00 AM

## 2021-03-13 NOTE — PROCEDURES
Eugenie Doe is a 67 y.o. female patient. 1. Acute GI bleeding    2. Acute upper GI bleed      Past Medical History:   Diagnosis Date    Pneumonia     Thyroid disease      Blood pressure 114/61, pulse 76, temperature 98 °F (36.7 °C), temperature source Oral, resp. rate 19, height 5' 4\" (1.626 m), weight 114 lb 13.8 oz (52.1 kg), SpO2 100 %. Paracentesis    Date/Time: 3/13/2021 1:26 PM  Performed by: Keenan San MD  Authorized by: Keenan San MD   Consent: Verbal consent obtained. Written consent obtained. Risks and benefits: risks, benefits and alternatives were discussed  Consent given by: patient  Patient understanding: patient states understanding of the procedure being performed  Patient consent: the patient's understanding of the procedure matches consent given  Procedure consent: procedure consent matches procedure scheduled  Relevant documents: relevant documents present and verified  Test results: test results available and properly labeled  Site marked: the operative site was marked  Imaging studies: imaging studies available  Required items: required blood products, implants, devices, and special equipment available  Patient identity confirmed: verbally with patient and arm band  Time out: Immediately prior to procedure a \"time out\" was called to verify the correct patient, procedure, equipment, support staff and site/side marked as required. Initial or subsequent exam: initial  Procedure purpose: diagnostic and therapeutic  Indications: abdominal discomfort secondary to ascites  Anesthesia: local infiltration    Anesthesia:  Local Anesthetic: lidocaine 2% without epinephrine  Anesthetic total: 5 mL    Sedation:  Patient sedated: no    Preparation: Patient was prepped and draped in the usual sterile fashion.   Needle gauge: 20  Ultrasound guidance: yes  Puncture site: right lower quadrant  Fluid removed: 1000(ml)  Fluid appearance: clear  Dressing: 4x4 sterile gauze  Patient tolerance: patient tolerated the procedure well with no immediate complications  Comments: ESBL <1mL          Karlie Vargas MD  3/13/2021

## 2021-03-13 NOTE — PROGRESS NOTES
Hospitalist Progress Note      PCP: Pelon Mcdaniel MD    Date of Admission: 3/12/2021    Chief Complaint: Hematemesis    Hospital Course: Admitted for acute blood loss anemia secondary to GI bleed. Status post variceal bands placement and blood transfusion. Continue Protonix/octreotide drip and IV antibiotics. Plan for paracentesis today. GI following    Subjective: Patient complaining of mild epigastric pain. Denies nausea vomiting or further episode of hematemesis. Medications:  Reviewed    Infusion Medications    octreotide (SANDOSTATIN) infusion 25 mcg/hr (03/13/21 0425)    dextrose      sodium chloride      pantoprozole (PROTONIX) infusion 8 mg/hr (03/13/21 0425)    sodium chloride      sodium chloride       Scheduled Medications    rifaximin  550 mg Oral BID    hepatitis B vaccine (ADULT)  1 mL Intramuscular Once    spironolactone  100 mg Oral Daily    furosemide  40 mg Oral Daily    nadolol  10 mg Oral Nightly    sodium chloride flush  10 mL Intravenous 2 times per day    insulin lispro  0-6 Units Subcutaneous Q6H    cefTRIAXone (ROCEPHIN) IV  1,000 mg Intravenous Q24H    levothyroxine  50 mcg Oral Daily     PRN Meds: sodium chloride flush, promethazine **OR** ondansetron, acetaminophen **OR** acetaminophen, glucose, dextrose, glucagon (rDNA), dextrose, sodium chloride, polyvinyl alcohol, sodium chloride, sodium chloride      Intake/Output Summary (Last 24 hours) at 3/13/2021 0844  Last data filed at 3/13/2021 0600  Gross per 24 hour   Intake 3502.08 ml   Output 150 ml   Net 3352.08 ml       Physical Exam Performed:    /76   Pulse 69   Temp 98 °F (36.7 °C) (Oral)   Resp 27   Ht 5' 4\" (1.626 m)   Wt 114 lb 13.8 oz (52.1 kg)   SpO2 96%   BMI 19.72 kg/m²     General appearance: No apparent distress, appears stated age and cooperative. HEENT: Pupils equal, round, and reactive to light. Conjunctivae/corneas pallor  Neck: Supple, with full range of motion.  No jugular venous distention. Trachea midline. Respiratory:  Normal respiratory effort. Clear to auscultation, bilaterally without Rales/Wheezes/Rhonchi. Cardiovascular: Regular rate and rhythm with normal S1/S2 without murmurs, rubs or gallops. Abdomen: Soft, non-tender, distended with fluid thrill and normal bowel sounds. Musculoskeletal: No clubbing, cyanosis or edema bilaterally. Full range of motion without deformity. Neurologic:  Grossly non-focal.  Capillary Refill: Brisk,< 3 seconds   Peripheral Pulses: +2 palpable, equal bilaterally       Labs:   Recent Labs     03/11/21 2319 03/11/21 2319 03/12/21  1125 03/12/21  1745 03/13/21  0015 03/13/21  0502   WBC 15.1*  --  8.5  --   --  8.0   HGB 3.3*   < > 6.8* 6.8* 9.0* 9.2*   HCT 12.4*   < > 21.2* 21.1* 27.1* 27.7*     --  130*  --   --  99*    < > = values in this interval not displayed. Recent Labs     03/11/21 2319 03/12/21  1125 03/13/21  0502   * 135* 139   K 4.1 4.3 3.8    106 110   CO2 16* 22 20*   BUN 24* 22* 18   CREATININE 1.1 0.9 0.9   CALCIUM 8.4 7.9* 7.8*     Recent Labs     03/12/21  1125 03/13/21  0502   AST 23 32   ALT 17 19   BILIDIR <0.2 <0.2   BILITOT 0.6 0.6   ALKPHOS 92 85     Recent Labs     03/11/21 2319 03/12/21  1010   INR 1.24* 1.14     Recent Labs     03/11/21 2319   TROPONINI <0.01       Urinalysis:    No results found for: [facility], BACTERIA, RBCUA, BLOODU, SPECGRAV, GLUCOSEU    Radiology:  XR CHEST PORTABLE   Final Result      Right jugular central venous catheter tip in the low SVC without pneumothorax. Diffuse interstitial prominence particularly lung bases, most likely chronic interstitial lung disease/fibrosis. Normal cardiomediastinal silhouette.       US ABDOMEN LIMITED    (Results Pending)   US DUP ABD PEL RETRO SCROT COMPLETE    (Results Pending)           Assessment/Plan:    Acute blood loss anemia secondary to variceal bleed  Status post blood transfusion   Status post band placement x5 03/13/21  Monitor hemoglobin, transfuse if less than 7 g/dL  Continue octreotide drip along with Protonix drip  Continue prophylactic prophylactic antibiotics  GI following     Liver cirrhosis/ascites/PSC  Nadolol and diuretics started today  Continue Xifaxan  Hepatitis B immunization ordered for today  AFP pending  Plan for paracentesis today     Lactic acidosis: Resolved  Was related to hypoperfusion  Resolved after blood transfusion     Leukocytosis: Improving  On Rocephin for SBP prophylaxis    Hypothyroidism:  Continue Synthroid    DVT Prophylaxis: SCDs, on hold due to bleed  Diet: DIET CLEAR LIQUID;  Code Status: Full Code    PT/OT Eval Status: Once Stable    Dispo -pending hemodynamic stability, transition off of Protonix/octreotide drip, GI recs, PT OT    Kelsi Pink MD

## 2021-03-13 NOTE — PROGRESS NOTES
Internal Medicine ICU Progress Note    Admit: 3/12/2021    Name:  Mally Perry  Room:  6984/2865-30  MRN:    [id number]  PCP: Oneida Faria MD  CC: Hematemesis    Overnight Events     Marylen Eye, pt feeling well and hungry today. S/p EGD, Cscope, Discussed with GI, need for diagnostic and possibly therapeutic paracentesis today. RBA discussed, consent to be signed. H/H stable, HDS, on RA, no stools since 7PM yesterday, received pRBC overnight    Review of Systems  Review of Systems   Constitutional: Negative for chills, fatigue and fever. HENT: Negative for congestion, postnasal drip, sinus pressure, sinus pain, sore throat and trouble swallowing. Eyes: Negative for photophobia and visual disturbance. Respiratory: Negative for cough, chest tightness, shortness of breath and wheezing. Cardiovascular: Negative for chest pain, palpitations and leg swelling. Gastrointestinal: Positive for abdominal distention, abdominal pain (Mild) and diarrhea. Negative for constipation, nausea and vomiting. Genitourinary: Negative for difficulty urinating, dysuria, frequency, hematuria and urgency. Musculoskeletal: Negative for arthralgias, back pain, neck pain and neck stiffness. Skin: Negative. Neurological: Negative for dizziness, seizures, weakness and numbness. Psychiatric/Behavioral: Negative for confusion, decreased concentration and dysphoric mood. ICU/Hospital Course   Zia Freedman is a 66 yo F w/ PMH of liver cirrhosis, PBC, and hypothyroidism who presented to [facility] with hematemesis and melena. She reports experiencing 2 episodes of both red bloody emesis and black diarrhea yesterday around 1300 and 2200. Prior to that she reports 2 weeks of sinus drainage, occasional headaches, fatigue and weakness. She saw a PCP 2 weeks ago, reports she started Z-pack and prednisone but did not complete courses. States she may have taken 2 doses of ibuprofen over the past 2 weeks. Denies AC or ASA use. In October 2019 she was referred to gastroenterologist for elevated alkaline phosphatase, was under treatment for primary biliary cholangitis and also diagnosed with hepatic fibrosis.     At [facility] her labs were significant for Hgb 3.3, WBC 15.1, BUN 24, Lactic Acid 8.8. EKG revealed sinus tachycardia of 115. CT revealed cirrhotic appearance to liver, multiple gastric varices, large volume ascites, findings concerning with oozing of contrast/blood from colonic mucosa with no discrete bleeding site identified, colonic wall thickening possibly due to liver disease or infection or ischemia or colitis, multiple gallstones, and large pericholecystic fluid difficult to r/o cholecystitis. PRB transfusions and octreotide gtt were started. Patient was transferred to [facility], reportedly for TIPS consideration.      Upon arrival to [facility] patient was receiving reportedly her 2nd PRBC with 2 PIVs for access. She was afebrile, hemodynamically stable with BP in 110s/60s and HR in 100s. Her only complaints were dry mouth and eye soreness. She denied any additional emesis or BMs since arriving to [facility]. She denied light-headedness, dizziness, abdominal pain, nausea, CP, SOB. She denied history of IVDU or EtOH abuse. She believed she had been diagnosed with cirrhosis before but was unsure.  She denied a history of hepatitis           Medications:   rifaximin  550 mg Oral BID    hepatitis B vaccine (ADULT)  1 mL Intramuscular Once    spironolactone  100 mg Oral Daily    furosemide  40 mg Oral Daily    nadolol  10 mg Oral Nightly    sodium chloride flush  10 mL Intravenous 2 times per day    insulin lispro  0-6 Units Subcutaneous Q6H    cefTRIAXone (ROCEPHIN) IV  1,000 mg Intravenous Q24H    levothyroxine  50 mcg Oral Daily     Infusions:   octreotide (SANDOSTATIN) infusion 25 mcg/hr (03/13/21 0425)    dextrose      sodium chloride      pantoprozole (PROTONIX) infusion 8 mg/hr (03/13/21 0425)    sodium chloride      sodium chloride       PRN Medications:  sodium chloride flush, promethazine **OR** ondansetron, acetaminophen **OR** acetaminophen, glucose, dextrose, glucagon (rDNA), dextrose, sodium chloride, polyvinyl alcohol, sodium chloride, sodium chloride    Vent Settings:    / / /       Physical Exam     Temp  Av.9 °F (36.6 °C)  Min: 97.5 °F (36.4 °C)  Max: 98.7 °F (37.1 °C)  Pulse  Av.6  Min: 61  Max: 89  BP  Min: 81/53  Max: 140/64  SpO2  Av.2 %  Min: 76 %  Max: 100 %  Patient Vitals for the past 4 hrs:   BP Pulse Resp SpO2 Weight   21 0900 114/71 74 17     21 0830  72 25 95 %    21 0800 105/87 70 20 96 %    21 0730  71 19 96 %    21 0700 112/76 69 27 96 %    21 0630    95 % 114 lb 13.8 oz (52.1 kg)   21 0600 116/68 74 18 92 %        Intake/Output Summary (Last 24 hours) at 3/13/2021 0954  Last data filed at 3/13/2021 0600  Gross per 24 hour   Intake 3502.08 ml   Output 150 ml   Net 3352.08 ml       Physical Examination:   Physical Exam  Vitals signs reviewed. Constitutional:       General: She is not in acute distress. Appearance: Normal appearance. She is well-developed. She is not diaphoretic. HENT:      Head: Normocephalic and atraumatic. Mouth/Throat:      Mouth: Mucous membranes are moist.      Pharynx: Oropharynx is clear. No oropharyngeal exudate. Eyes:      General: No scleral icterus. Extraocular Movements: Extraocular movements intact. Neck:      Musculoskeletal: Normal range of motion. Trachea: No tracheal deviation. Cardiovascular:      Rate and Rhythm: Normal rate and regular rhythm. Pulses: Normal pulses. Heart sounds: Normal heart sounds. Pulmonary:      Effort: Pulmonary effort is normal. No respiratory distress. Breath sounds: Normal breath sounds. No wheezing or rales. Abdominal:      General: Bowel sounds are normal. There is distension. Palpations: Abdomen is soft. Tenderness:  There is abdominal tenderness (Mild diffuse). Musculoskeletal: Normal range of motion. General: No swelling or tenderness. Skin:     General: Skin is warm and dry. Coloration: Skin is pale. Findings: No erythema or rash. Neurological:      Mental Status: She is alert and oriented to person, place, and time. Mental status is at baseline. Cranial Nerves: No cranial nerve deficit. Sensory: No sensory deficit. Psychiatric:         Mood and Affect: Mood normal.         Behavior: Behavior normal.         Thought Content: Thought content normal.         Judgment: Judgment normal.         Labs and Imaging     Metabolic Panels:  Recent Labs     03/11/21 2319 03/12/21  1125 03/13/21  0502   BUN 24* 22* 18   CREATININE 1.1 0.9 0.9   * 135* 139   K 4.1 4.3 3.8   CO2 16* 22 20*    106 110   MG  --  1.90 1.80   AST  --  23 32   ALT  --  17 19   PROT  --  5.1* 5.4*       Recent Labs     03/12/21 1125 03/13/21  0502   ALKPHOS 92 85   ALT 17 19   AST 23 32   PROT 5.1* 5.4*   BILITOT 0.6 0.6   BILIDIR <0.2 <0.2   LABALBU 2.7* 2.7*        Recent Labs     03/11/21 2319 03/12/21  0752 03/12/21  1125 03/12/21  1303 03/12/21  1823 03/13/21  0502 03/13/21  0835   GLUCOSE 277*  --  179*  --   --  122*  --    POCGLU  --  253*  --  156* 118*  --  113*        No results for input(s): LABA1C in the last 72 hours.     Hematology:  Recent Labs     03/11/21 2319 03/11/21  2357 03/12/21  1125 03/12/21  1745 03/13/21  0015 03/13/21  0502   WBC 15.1*  --  8.5  --   --  8.0   HGB 3.3* 3.2* 6.8* 6.8* 9.0* 9.2*   HCT 12.4* 11.7* 21.2* 21.1* 27.1* 27.7*     --  130*  --   --  99*   MCV 80.3  --  82.4  --   --  83.5   MCH 21.5*  --  26.6  --   --  27.6   MCHC 26.8*  --  32.2  --   --  33.0   RDW 19.0*  --  18.1*  --   --  16.4*   EOSABS 0.3  --  0.0  --   --  0.3       Lab Results   Component Value Date    IRON 21 (L) 03/12/2021    TIBC 316 03/12/2021    FERRITIN 9.6 (L) 03/12/2021    [id number] >2000 pending  LDSSI, poct glucose ac/qhs, LDSSI    Hypothyroid on levothyroxine 50    Code: Full Code  FEN: DIET CLEAR LIQUID;  PPX: No chem ppx d/t GIB  Dispo: ICU    I will discuss the patient with Dr. Swapna Rodriguez  ------    Lucho Charles MD PGY3  3/13/2021 9:54 AM     Pulm/CC     Patient seen and examined. I agree with Dr. Emmanuelle Abrams history, physical, lab findings, assessment and plan and edited as needed.     No clinical signs of ongoing GI blood loss. Pulse of 76 with a blood pressure 114/61. Hemoglobin this morning is 9.2 and platelets are 99. She is required no more blood since the initial 4 units of packed RBCs.   MIRANDA has resolved    Esophagogastroduodenoscopy was performed yesterday and 6 bands were placed  Continue octreotide drip, Protonix drip and Rocephin  Large-volume paracentesis today  AFP and abdominal ultrasound pending  Check hemoglobin daily    Potentially can be transferred to the floor if ICU bed needed     Goldy Dwyer MD

## 2021-03-14 LAB
ALBUMIN SERPL-MCNC: 2.7 G/DL (ref 3.4–5)
ALP BLD-CCNC: 84 U/L (ref 40–129)
ALT SERPL-CCNC: 17 U/L (ref 10–40)
ANION GAP SERPL CALCULATED.3IONS-SCNC: 8 MMOL/L (ref 3–16)
AST SERPL-CCNC: 28 U/L (ref 15–37)
BASOPHILS ABSOLUTE: 0.1 K/UL (ref 0–0.2)
BASOPHILS RELATIVE PERCENT: 1.1 %
BILIRUB SERPL-MCNC: 0.6 MG/DL (ref 0–1)
BILIRUBIN DIRECT: <0.2 MG/DL (ref 0–0.3)
BILIRUBIN, INDIRECT: ABNORMAL MG/DL (ref 0–1)
BUN BLDV-MCNC: 15 MG/DL (ref 7–20)
CALCIUM SERPL-MCNC: 7.9 MG/DL (ref 8.3–10.6)
CHLORIDE BLD-SCNC: 104 MMOL/L (ref 99–110)
CO2: 26 MMOL/L (ref 21–32)
CREAT SERPL-MCNC: 1 MG/DL (ref 0.6–1.2)
EOSINOPHILS ABSOLUTE: 0.7 K/UL (ref 0–0.6)
EOSINOPHILS RELATIVE PERCENT: 7.7 %
GFR AFRICAN AMERICAN: >60
GFR NON-AFRICAN AMERICAN: 54
GLUCOSE BLD-MCNC: 130 MG/DL (ref 70–99)
GLUCOSE BLD-MCNC: 137 MG/DL (ref 70–99)
GLUCOSE BLD-MCNC: 179 MG/DL (ref 70–99)
GLUCOSE BLD-MCNC: 184 MG/DL (ref 70–99)
HCT VFR BLD CALC: 29.4 % (ref 36–48)
HCT VFR BLD CALC: 30.5 % (ref 36–48)
HCT VFR BLD CALC: 31 % (ref 36–48)
HCT VFR BLD CALC: 31.4 % (ref 36–48)
HEMOGLOBIN: 10 G/DL (ref 12–16)
HEMOGLOBIN: 9.6 G/DL (ref 12–16)
HEMOGLOBIN: 9.9 G/DL (ref 12–16)
HEMOGLOBIN: 9.9 G/DL (ref 12–16)
INR BLD: 1.14 (ref 0.86–1.14)
LYMPHOCYTES ABSOLUTE: 1 K/UL (ref 1–5.1)
LYMPHOCYTES RELATIVE PERCENT: 11 %
MAGNESIUM: 1.6 MG/DL (ref 1.8–2.4)
MCH RBC QN AUTO: 27.6 PG (ref 26–34)
MCHC RBC AUTO-ENTMCNC: 32.5 G/DL (ref 31–36)
MCV RBC AUTO: 84.9 FL (ref 80–100)
MONOCYTES ABSOLUTE: 0.7 K/UL (ref 0–1.3)
MONOCYTES RELATIVE PERCENT: 7 %
NEUTROPHILS ABSOLUTE: 6.9 K/UL (ref 1.7–7.7)
NEUTROPHILS RELATIVE PERCENT: 73.2 %
PDW BLD-RTO: 16.8 % (ref 12.4–15.4)
PERFORMED ON: ABNORMAL
PLATELET # BLD: 95 K/UL (ref 135–450)
PMV BLD AUTO: 8.1 FL (ref 5–10.5)
POTASSIUM SERPL-SCNC: 3.3 MMOL/L (ref 3.5–5.1)
PROTHROMBIN TIME: 13.2 SEC (ref 10–13.2)
RBC # BLD: 3.59 M/UL (ref 4–5.2)
SODIUM BLD-SCNC: 138 MMOL/L (ref 136–145)
TOTAL PROTEIN: 5.4 G/DL (ref 6.4–8.2)
TSH REFLEX: 2.33 UIU/ML (ref 0.27–4.2)
VITAMIN D 25-HYDROXY: 29.6 NG/ML
WBC # BLD: 9.5 K/UL (ref 4–11)

## 2021-03-14 PROCEDURE — 2580000003 HC RX 258: Performed by: STUDENT IN AN ORGANIZED HEALTH CARE EDUCATION/TRAINING PROGRAM

## 2021-03-14 PROCEDURE — 1200000000 HC SEMI PRIVATE

## 2021-03-14 PROCEDURE — 6370000000 HC RX 637 (ALT 250 FOR IP): Performed by: STUDENT IN AN ORGANIZED HEALTH CARE EDUCATION/TRAINING PROGRAM

## 2021-03-14 PROCEDURE — 82306 VITAMIN D 25 HYDROXY: CPT

## 2021-03-14 PROCEDURE — 85025 COMPLETE CBC W/AUTO DIFF WBC: CPT

## 2021-03-14 PROCEDURE — 6360000002 HC RX W HCPCS: Performed by: SURGERY

## 2021-03-14 PROCEDURE — 80076 HEPATIC FUNCTION PANEL: CPT

## 2021-03-14 PROCEDURE — 6360000002 HC RX W HCPCS: Performed by: INTERNAL MEDICINE

## 2021-03-14 PROCEDURE — 6360000002 HC RX W HCPCS: Performed by: STUDENT IN AN ORGANIZED HEALTH CARE EDUCATION/TRAINING PROGRAM

## 2021-03-14 PROCEDURE — C9113 INJ PANTOPRAZOLE SODIUM, VIA: HCPCS | Performed by: STUDENT IN AN ORGANIZED HEALTH CARE EDUCATION/TRAINING PROGRAM

## 2021-03-14 PROCEDURE — C9113 INJ PANTOPRAZOLE SODIUM, VIA: HCPCS | Performed by: INTERNAL MEDICINE

## 2021-03-14 PROCEDURE — 80048 BASIC METABOLIC PNL TOTAL CA: CPT

## 2021-03-14 PROCEDURE — 6370000000 HC RX 637 (ALT 250 FOR IP): Performed by: INTERNAL MEDICINE

## 2021-03-14 PROCEDURE — 2580000003 HC RX 258: Performed by: INTERNAL MEDICINE

## 2021-03-14 PROCEDURE — 85014 HEMATOCRIT: CPT

## 2021-03-14 PROCEDURE — 99233 SBSQ HOSP IP/OBS HIGH 50: CPT | Performed by: INTERNAL MEDICINE

## 2021-03-14 PROCEDURE — 84590 ASSAY OF VITAMIN A: CPT

## 2021-03-14 PROCEDURE — 83735 ASSAY OF MAGNESIUM: CPT

## 2021-03-14 PROCEDURE — 36415 COLL VENOUS BLD VENIPUNCTURE: CPT

## 2021-03-14 PROCEDURE — 36592 COLLECT BLOOD FROM PICC: CPT

## 2021-03-14 PROCEDURE — 85018 HEMOGLOBIN: CPT

## 2021-03-14 PROCEDURE — 85610 PROTHROMBIN TIME: CPT

## 2021-03-14 PROCEDURE — 84443 ASSAY THYROID STIM HORMONE: CPT

## 2021-03-14 RX ORDER — PANTOPRAZOLE SODIUM 40 MG/10ML
40 INJECTION, POWDER, LYOPHILIZED, FOR SOLUTION INTRAVENOUS 2 TIMES DAILY
Status: DISCONTINUED | OUTPATIENT
Start: 2021-03-15 | End: 2021-03-15

## 2021-03-14 RX ORDER — MAGNESIUM SULFATE IN WATER 40 MG/ML
2000 INJECTION, SOLUTION INTRAVENOUS ONCE
Status: COMPLETED | OUTPATIENT
Start: 2021-03-14 | End: 2021-03-14

## 2021-03-14 RX ORDER — M-VIT,TX,IRON,MINS/CALC/FOLIC 27MG-0.4MG
1 TABLET ORAL DAILY
Status: DISCONTINUED | OUTPATIENT
Start: 2021-03-14 | End: 2021-03-15 | Stop reason: HOSPADM

## 2021-03-14 RX ORDER — NADOLOL 40 MG/1
10 TABLET ORAL NIGHTLY
Status: DISCONTINUED | OUTPATIENT
Start: 2021-03-14 | End: 2021-03-15

## 2021-03-14 RX ORDER — POTASSIUM CHLORIDE 29.8 MG/ML
20 INJECTION INTRAVENOUS
Status: COMPLETED | OUTPATIENT
Start: 2021-03-14 | End: 2021-03-14

## 2021-03-14 RX ADMIN — SODIUM CHLORIDE 8 MG/HR: 9 INJECTION, SOLUTION INTRAVENOUS at 22:54

## 2021-03-14 RX ADMIN — POTASSIUM CHLORIDE 20 MEQ: 29.8 INJECTION, SOLUTION INTRAVENOUS at 09:44

## 2021-03-14 RX ADMIN — MULTIPLE VITAMINS W/ MINERALS TAB 1 TABLET: TAB at 09:44

## 2021-03-14 RX ADMIN — RIFAXIMIN 550 MG: 550 TABLET ORAL at 21:30

## 2021-03-14 RX ADMIN — SODIUM CHLORIDE 8 MG/HR: 9 INJECTION, SOLUTION INTRAVENOUS at 10:17

## 2021-03-14 RX ADMIN — LEVOTHYROXINE SODIUM 50 MCG: 50 TABLET ORAL at 07:09

## 2021-03-14 RX ADMIN — RIFAXIMIN 550 MG: 550 TABLET ORAL at 09:44

## 2021-03-14 RX ADMIN — Medication 10 ML: at 08:26

## 2021-03-14 RX ADMIN — INSULIN LISPRO 1 UNITS: 100 INJECTION, SOLUTION INTRAVENOUS; SUBCUTANEOUS at 19:02

## 2021-03-14 RX ADMIN — Medication 10 ML: at 20:11

## 2021-03-14 RX ADMIN — CEFTRIAXONE 1000 MG: 1 INJECTION, POWDER, FOR SOLUTION INTRAMUSCULAR; INTRAVENOUS at 03:49

## 2021-03-14 RX ADMIN — POTASSIUM CHLORIDE 20 MEQ: 29.8 INJECTION, SOLUTION INTRAVENOUS at 08:25

## 2021-03-14 RX ADMIN — Medication 10 ML: at 20:09

## 2021-03-14 RX ADMIN — NADOLOL 10 MG: 40 TABLET ORAL at 20:09

## 2021-03-14 RX ADMIN — MAGNESIUM SULFATE HEPTAHYDRATE 2000 MG: 40 INJECTION, SOLUTION INTRAVENOUS at 07:09

## 2021-03-14 RX ADMIN — POTASSIUM CHLORIDE 20 MEQ: 29.8 INJECTION, SOLUTION INTRAVENOUS at 07:09

## 2021-03-14 ASSESSMENT — PAIN SCALES - GENERAL
PAINLEVEL_OUTOF10: 0

## 2021-03-14 NOTE — PROGRESS NOTES
ICU Progress Note        PGY1    Hospital Day: 3                                                         Code:Full Code  Admit Date: 3/12/2021                                 PCP: Keith Murillo MD    ICU Day: 3  Vent Day: None   Vent Settings: None  IV Access:  CVC RIJ, PIV LFA, LAC Central Line: yes  Duration:  3/12 - **   IV Fluids:  None  CVP:  None  Vasopressors:  None   Antibiotics: Rocephin   Indication: Cirrhosis/SBP prophy   Duration:  3/12 - **  Est Stop Date:  3/19      Perez Catheter:  N/A Indication: N/A Duration: N/A  Diet: DIET CLEAR LIQUID;    Daily Plan:  3/14/2021    Subjective:   Teo Vaca is a 67 y.o. female with PMH of liver cirrhosis, PBC, and hypothyroidism who presented with hematemesis and melena. Ms. Arnaud Lock reports doing well this morning. Denies chest pain, shortness of breath, abdominal pain (feels much better after paracentesis). No bleeding overnight. No bowel movement. H/H grossly stable. HPI:  She presented to [facility] with hematemesis and melena. She reports experiencing 2 episodes of both red bloody emesis and black diarrhea yesterday around 1300 and 2200. Prior to that she reports 2 weeks of sinus drainage, occasional headaches, fatigue and weakness. She saw a PCP 2 weeks ago, reports she started Z-pack and prednisone but did not complete courses. States she may have taken 2 doses of ibuprofen over the past 2 weeks. Denies AC or ASA use. In October 2019 she was referred to gastroenterologist for elevated alkaline phosphatase, was under treatment for primary biliary cholangitis and also diagnosed with hepatic fibrosis.     At [facility] her labs were significant for Hgb 3.3, WBC 15.1, BUN 24, Lactic Acid 8.8. EKG revealed sinus tachycardia of 115.  CT revealed cirrhotic appearance to liver, multiple gastric varices, large volume ascites, findings concerning with oozing of contrast/blood from colonic mucosa with no discrete bleeding site identified, colonic wall thickening possibly due to liver disease or infection or ischemia or colitis, multiple gallstones, and large pericholecystic fluid difficult to r/o cholecystitis. PRB transfusions and octreotide gtt were started. Patient was transferred to [facility], reportedly for TIPS consideration.      Upon arrival to [facility] patient was receiving reportedly her 2nd PRBC with 2 PIVs for access. She was afebrile, hemodynamically stable with BP in 110s/60s and HR in 100s. Her only complaints were dry mouth and eye soreness. She denied any additional emesis or BMs since arriving to [facility]. She denied light-headedness, dizziness, abdominal pain, nausea, CP, SOB. She denied history of IVDU or EtOH abuse. She believed she had been diagnosed with cirrhosis before but was unsure. She denied a history of hepatitis. Patient underwent EGD and colonoscopy which revealed varices with six bands placed. CTA was suggestive of colonic bleed.     Medications:     Scheduled Meds:   magnesium sulfate  2,000 mg Intravenous Once    potassium chloride  20 mEq Intravenous Q1H    rifaximin  550 mg Oral BID    spironolactone  100 mg Oral Daily    furosemide  40 mg Oral Daily    nadolol  10 mg Oral Nightly    sodium chloride flush  10 mL Intravenous 2 times per day    sodium chloride flush  10 mL Intravenous 2 times per day    insulin lispro  0-6 Units Subcutaneous Q6H    cefTRIAXone (ROCEPHIN) IV  1,000 mg Intravenous Q24H    levothyroxine  50 mcg Oral Daily     Continuous Infusions:   octreotide (SANDOSTATIN) infusion 25 mcg/hr (03/13/21 2219)    dextrose      sodium chloride      pantoprozole (PROTONIX) infusion 8 mg/hr (03/13/21 2238)    sodium chloride      sodium chloride       PRN Meds:sodium chloride flush, sodium chloride flush, promethazine **OR** ondansetron, acetaminophen **OR** acetaminophen, glucose, dextrose, glucagon (rDNA), dextrose, sodium chloride, polyvinyl alcohol, sodium chloride, sodium chloride    Objective:   Vitals  T-max: Temp (24hrs), Av.7 °F (36.5 °C), Min:97.4 °F (36.3 °C), Max:98 °F (36.7 °C)    Patient Vitals for the past 8 hrs:   BP Temp Temp src Pulse Resp SpO2   21 0600 (!) 90/55   65 17 90 %   21 0500 (!) 87/51   59 15 92 %   21 0400 (!) 92/53 98 °F (36.7 °C) Oral 54 14 92 %   21 0300    57 15 93 %   21 0100 (!) 95/56   56 14 91 %   21 0000 (!) 104/53 97.8 °F (36.6 °C) Axillary 57 15 93 %   21 2300 (!) 96/58   63 15 93 %       Intake/Output Summary (Last 24 hours) at 3/14/2021 0645  Last data filed at 3/13/2021 1738  Gross per 24 hour   Intake 168.93 ml   Output 1100 ml   Net -931.07 ml     I/O last 3 completed shifts: In: 401.3 [I.V.:240; IV Piggyback:161.3]  Out: 2150 [Urine:2150]    Physical Exam  Constitutional:       General: She is not in acute distress. Eyes:      General: Scleral icterus present. Extraocular Movements: Extraocular movements intact. Conjunctiva/sclera: Conjunctivae normal.      Pupils: Pupils are equal, round, and reactive to light. Cardiovascular:      Rate and Rhythm: Normal rate and regular rhythm. Pulmonary:      Effort: No respiratory distress. Breath sounds: No wheezing, rhonchi or rales. Abdominal:      General: Bowel sounds are normal. There is distension (mild). Palpations: Abdomen is soft. Tenderness: There is no abdominal tenderness. There is no guarding. Hernia: No hernia is present. Musculoskeletal:      Right lower leg: No edema. Left lower leg: No edema. Skin:     General: Skin is warm and dry. Coloration: Skin is pale (unclear if at baseline). Skin is not jaundiced. Neurological:      General: No focal deficit present. Mental Status: She is alert.        LABS    CBC:   Recent Labs     21  2319 21  2319 21  1125 21  1125 21  0502 21  1440 21  1820 21  0615   WBC 15.1* --  8.5  --  8.0  --   --   --    HGB 3.3*   < > 6.8*   < > 9.2* 10.1* 10.6* 9.6*   HCT 12.4*   < > 21.2*   < > 27.7* 31.0* 31.9* 29.4*     --  130*  --  99*  --   --   --    MCV 80.3  --  82.4  --  83.5  --   --   --     < > = values in this interval not displayed. Renal:   Recent Labs     03/12/21  1125 03/13/21  0502 03/14/21  0440   * 139 138   K 4.3 3.8 3.3*    110 104   CO2 22 20* 26   BUN 22* 18 15   CREATININE 0.9 0.9 1.0   GLUCOSE 179* 122* 130*   CALCIUM 7.9* 7.8* 7.9*   MG 1.90 1.80 1.60*   ANIONGAP 7 9 8     Hepatic:   Recent Labs     03/12/21 1125 03/13/21  0502 03/14/21  0440   AST 23 32 28   ALT 17 19 17   BILITOT 0.6 0.6 0.6   BILIDIR <0.2 <0.2 <0.2   PROT 5.1* 5.4* 5.4*   LABALBU 2.7* 2.7* 2.7*   ALKPHOS 92 85 84     Troponin:   Recent Labs     03/11/21 2319   TROPONINI <0.01     BNP: No results for input(s): BNP in the last 72 hours. Lipids: No results for input(s): CHOL, HDL in the last 72 hours. Invalid input(s): LDLCALCU, TRIGLYCERIDE  ABGs:  No results for input(s): PHART, ARO0SOF, PO2ART, KQQ4NPJ, BEART, THGBART, S7ETYKZW, YZM4NLZ in the last 72 hours. INR:   Recent Labs     03/11/21  2319 03/12/21  1010   INR 1.24* 1.14     Lactate: No results for input(s): LACTATE in the last 72 hours. -----------------------------------------------------------------  Imaging:  XR CHEST PORTABLE   Final Result      Right jugular central venous catheter tip in the low SVC without pneumothorax. Diffuse interstitial prominence particularly lung bases, most likely chronic interstitial lung disease/fibrosis. Normal cardiomediastinal silhouette. US ABDOMEN LIMITED    (Results Pending)   US DUP ABD PEL RETRO SCROT COMPLETE    (Results Pending)       Assessment/Plan:   Kamryn Cooney is a 67 y.o. female who presented with variceal bleeding and possible colonic bleed.      Decompensated Liver Cirrhosis 2/2 to PBC: EGD w/varices with red jovanni signs; CTA with colonic bleed. Diagnostic paracentesis. - GI consulted; appreciate recs   - Rocephin [3/12 - **; 3/7 days]   - d/c octreotide   - PPI gtt 3d [3/12 - 3/14], switch to BID PPI tomorrow   - low dose nadolol (EGD in a few weeks)   - soft low sodium diet   - held diuretics in the setting of low edema and low pressures/HR   - rifaximin 550 mg BID   - HepB vaccine, AFP, fat soluble vitamins, otpt DEXA   - f/u Mg/Phos/K (nutritional parameters)  - Paracentesis w/1L fluid removed (3/14)    Acute Blood Loss Anemia 2/2 GIB: patient grossly HDS. EGD w/varices with red jovanni signs; CTA with colonic bleed. - H/H q6H    MIRANDA, resolved -  Likely prerenal  - monitor UOP and I/Os    Hyperglycemia:    - A1c pending  - LDSSI  - POCT glucose AC + qHS    Code Status: Full Code  FEN:  DIET CLEAR LIQUID;  PPX: PPI gtt  DISPO: ICU, transfer to the floor    This patient has been staffed and discussed with Dr. Beckie Soriano    W/D/W/A  -----------------------------  Sabino Dean MD  PGY-1, Internal Medicine  Contact via [address] [place] [address]  3/14/2021  6:45 AM    Pulm/CC     Patient seen and examined. I agree with Dr. Muniz's history, physical, lab findings, assessment and plan and edited as needed.     No clinical signs of ongoing GI blood loss and has received no blood products in the last 24 hours  Esophagogastroduodenoscopy was performed Friday and 6 bands were placed  Octreotide drip stopped today. Continue Protonix drip through tomorrow and then transition to twice daily. Finish 7-day course of Rocephin  Large-volume paracentesis yesterday showed no SBP  AFP and abdominal ultrasound pending  Lasix discontinued today given borderline creatinine  Follow blood pressure closely given low normal readings with recently started nadolol  Continue rifaximin  Appreciate GI assistance     Transfer to floor.   Will sign off     Chris Hernadez MD

## 2021-03-14 NOTE — PROGRESS NOTES
GI Progress Note      Neri Gomez is a 67 y.o. female patient. 1. Acute GI bleeding    2. Acute upper GI bleed    3. Primary biliary cirrhosis ([address]        Admit Date: 3/12/2021    Subjective:       No bleeding.  No pain      ROS:  As per above    Scheduled Meds:   magnesium sulfate  2,000 mg Intravenous Once    potassium chloride  20 mEq Intravenous Q1H    rifaximin  550 mg Oral BID    spironolactone  100 mg Oral Daily    furosemide  40 mg Oral Daily    nadolol  10 mg Oral Nightly    sodium chloride flush  10 mL Intravenous 2 times per day    sodium chloride flush  10 mL Intravenous 2 times per day    insulin lispro  0-6 Units Subcutaneous Q6H    cefTRIAXone (ROCEPHIN) IV  1,000 mg Intravenous Q24H    levothyroxine  50 mcg Oral Daily       Continuous Infusions:   octreotide (SANDOSTATIN) infusion 25 mcg/hr (03/13/21 0425)    dextrose      sodium chloride      pantoprozole (PROTONIX) infusion 8 mg/hr (03/13/21 2320)    sodium chloride      sodium chloride         PRN Meds:  sodium chloride flush, sodium chloride flush, promethazine **OR** ondansetron, acetaminophen **OR** acetaminophen, glucose, dextrose, glucagon (rDNA), dextrose, sodium chloride, polyvinyl alcohol, sodium chloride, sodium chloride      Objective:       Patient Vitals for the past 24 hrs:   BP Temp Temp src Pulse Resp SpO2   03/14/21 0600 (!) 90/55   65 17 90 %   03/14/21 0500 (!) 87/51   59 15 92 %   03/14/21 0400 (!) 92/53 98 °F (36.7 °C) Oral 54 14 92 %   03/14/21 0300    57 15 93 %   03/14/21 0100 (!) 95/56   56 14 91 %   03/14/21 0000 (!) 104/53 97.8 °F (36.6 °C) Axillary 57 15 93 %   03/13/21 2300 (!) 96/58   63 15 93 %   03/13/21 2200 109/64   65 21 93 %   03/13/21 2100 111/66   69 20 93 %   03/13/21 2000 114/70 97.4 °F (36.3 °C) Oral 69 18 95 %   03/13/21 1900 109/64   67 23 92 %   03/13/21 1800 121/74   81 22    03/13/21 1700 103/62   68 18 99 %   03/13/21 1630    68 21 91 %   03/13/21 1611  97.7 °F (36.5 °C) Oral      21 1600 116/63   78 18 98 %   21 1530    79 17 (!) 81 %   21 1519    70 20    21 1518    71 22    21 1517    72 16    21 1516    69 25    21 1500 (!) 107/39   79 25 (!) 79 %   21 1430    79 25 (!) 86 %   21 1400 137/72   85 26    21 1330    73 26 (!) 81 %   21 1300 101/65   78 29 90 %   21 1230  97.6 °F (36.4 °C) Oral 79 22 93 %   21 1200 107/72   71 23 (!) 88 %   21 1130    76 19 100 %   21 1100 114/61   67 24 97 %   21 1030    67 26    21 1000 112/67   69 22    21 0930    79 23    21 0900 114/71   74 17    21 0830  97.8 °F (36.6 °C) Oral 72 25 95 %   21 0800 105/87   70 20 96 %       Exam:  VITALS:  BP (!) 90/55   Pulse 65   Temp 98 °F (36.7 °C) (Oral)   Resp 17   Ht 5' 4\" (1.626 m)   Wt 114 lb 13.8 oz (52.1 kg)   SpO2 90%   BMI 19.72 kg/m²   TEMPERATURE:  Current - Temp: 98 °F (36.7 °C); Max - Temp  Av.7 °F (36.5 °C)  Min: 97.4 °F (36.3 °C)  Max: 98 °F (36.7 °C)      General appearance: alert, appears stated age, cooperative and no distress  Head: Normocephalic, without obvious abnormality, atraumatic  Neck: supple, symmetrical, trachea midline and thyroid not enlarged, symmetric, no tenderness/mass/nodules  CVS:  RRR, Nl s1s2  Lungs CTA Bilaterally, normal effort  Abdomen: soft, non-tender; bowel sounds normal; no masses,  no organomegaly  AAOx3, No asterixis or encephalopathy  Extremities: No edema.       Recent Labs     21  2319 21  2319 21  1125 21  1125 21  0502 21  1440 21  1820 21  0615   WBC 15.1*  --  8.5  --  8.0  --   --   --    HGB 3.3*   < > 6.8*   < > 9.2* 10.1* 10.6* 9.6*   HCT 12.4*   < > 21.2*   < > 27.7* 31.0* 31.9* 29.4*   MCV 80.3  --  82.4  --  83.5  --   --   --      --  130*  --  99*  --   --   --     < > = values in this interval not displayed. Recent Labs     03/12/21  1125 03/13/21  0502 03/14/21  0440   * 139 138   K 4.3 3.8 3.3*    110 104   CO2 22 20* 26   BUN 22* 18 15   CREATININE 0.9 0.9 1.0     Recent Labs     03/12/21  1125 03/13/21  0502 03/14/21  0440   AST 23 32 28   ALT 17 19 17   BILIDIR <0.2 <0.2 <0.2   BILITOT 0.6 0.6 0.6   ALKPHOS 92 85 84     No results for input(s): LIPASE, AMYLASE in the last 72 hours. Recent Labs     03/11/21  2319 03/12/21  1010 03/12/21  1125 03/13/21  0502 03/14/21  0440   PROT  --   --  5.1* 5.4* 5.4*   INR 1.24* 1.14  --   --   --      No results for input(s): PTT in the last 72 hours. No results for input(s): OCCULTBLD in the last 72 hours. Assessment:       Cirrhosis  Minimal HE  Ascites  Varices  Gi bleed  anemia    Recommendations:       Watch creat with diuretics; going to stop these now as not much peripheral edema; will just try to treat with low Na+ diet  May not be able to tolerate even low dose nadolol with low BP and HR in the 50s.  Will monitor but may need to stop  Check nutritional parameters  Unclear if kenya will help her given decompensated cirrhosis  Start Hep B series  Immune to Hep A  F/u AFP  Stop octreotide, ppi gtt tomorrowl bid ppi x 4 weeks; repeat EGD in 4 weeks; antibiotics for 7 days    Cristi Willis  3/14/2021  7:35 AM

## 2021-03-14 NOTE — PROGRESS NOTES
Hospitalist Progress Note      PCP: Leah Reddy MD    Date of Admission: 3/12/2021    Chief Complaint: Hematemesis    Hospital Course: Admitted for acute blood loss anemia secondary to GI bleed. Status post variceal bands placement and blood transfusion and paracentesis. Continue Protonix drip and IV antibiotics. Octreotide drip and diuretic discontinued today. Continue nadolol with caution. GI following    Subjective: Patient states she is doing well. Felt some relief in abdomen after paracentesis. Denies nausea vomiting or further episode of hematemesis.       Medications:  Reviewed    Infusion Medications    octreotide (SANDOSTATIN) infusion 25 mcg/hr (03/13/21 0425)    dextrose      sodium chloride      pantoprozole (PROTONIX) infusion 8 mg/hr (03/13/21 2020)    sodium chloride      sodium chloride       Scheduled Medications    potassium chloride  20 mEq Intravenous Q1H    therapeutic multivitamin-minerals  1 tablet Oral Daily    rifaximin  550 mg Oral BID    nadolol  10 mg Oral Nightly    sodium chloride flush  10 mL Intravenous 2 times per day    sodium chloride flush  10 mL Intravenous 2 times per day    insulin lispro  0-6 Units Subcutaneous Q6H    cefTRIAXone (ROCEPHIN) IV  1,000 mg Intravenous Q24H    levothyroxine  50 mcg Oral Daily     PRN Meds: sodium chloride flush, sodium chloride flush, promethazine **OR** ondansetron, acetaminophen **OR** acetaminophen, glucose, dextrose, glucagon (rDNA), dextrose, sodium chloride, polyvinyl alcohol, sodium chloride, sodium chloride      Intake/Output Summary (Last 24 hours) at 3/14/2021 0912  Last data filed at 3/14/2021 0600  Gross per 24 hour   Intake 401.27 ml   Output 2150 ml   Net -1748.73 ml       Physical Exam Performed:    /74   Pulse 56   Temp 98 °F (36.7 °C) (Oral)   Resp 19   Ht 5' 4\" (1.626 m)   Wt 114 lb 13.8 oz (52.1 kg)   SpO2 98%   BMI 19.72 kg/m²     General appearance: No apparent distress, appears stated cardiomediastinal silhouette. US ABDOMEN LIMITED    (Results Pending)   US DUP ABD PEL RETRO SCROT COMPLETE    (Results Pending)           Assessment/Plan:    Acute blood loss anemia secondary to variceal bleed  Status post blood transfusion   Status post band placement x6 03/13/21  Hemoglobin stable, 9.9 today  Monitor hemoglobin, transfuse if less than 7 g/dL  Octreotide drip discontinued today  Continue Protonix drip today. Plan to transition to twice daily PPI tomorrow  Continue prophylactic antibiotics  GI following     Liver cirrhosis/ascites/PSC  Immune to hepatitis A, hepatitis B vaccine given 03/13/21  S/p paracentesis 03/13/2021.  1 L of fluid removed. Diuretics were started but discontinued due to low blood pressure   Continue nadolol with hold parameters as patient has low blood pressure and has had heart rate in high 50s.   Continue Xifaxan  AFP pending  GI following     Lactic acidosis: Resolved  Was related to hypoperfusion  Resolved after blood transfusion     Leukocytosis: Improving  On Rocephin for SBP prophylaxis    Hypothyroidism:  Continue Synthroid    Electrolyte abnormalities:  Potassium and magnesium replaced  Continue to monitor and replace as needed    DVT Prophylaxis: SCDs, on hold due to bleed  Diet: DIET LOW SODIUM 2 GM;  Code Status: Full Code    PT/OT Eval Status: Once Stable    Dispo -pending transition off of Protonix drip, GI recs, PT OT    Kajal Vergara MD

## 2021-03-14 NOTE — PROGRESS NOTES
Internal Medicine PGY-1 Transfer Note        PCP: Little Escalante MD    Date of Admission: 3/12/2021    Chief Complaint: No chief complaint on file. Interval History:   Pt was transferred to [facility] in stable condition. Pt seen and examined at bedside. Is oxygenating on 2L of O2. Resting comfortably in bed. Denies any nausea, vomiting, chest pain, fevers, chills. Hospital Course:   Wilfrido Gates is a 68 yo F w/ PMH of liver cirrhosis, PBC, and hypothyroidism who presented to [facility] with hematemesis and melena. She reports experiencing 2 episodes of both red bloody emesis and black diarrhea yesterday around 1300 and 2200. Prior to that she reports 2 weeks of sinus drainage, occasional headaches, fatigue and weakness. She saw a PCP 2 weeks ago, reports she started Z-pack and prednisone but did not complete courses. States she may have taken 2 doses of ibuprofen over the past 2 weeks. Denies AC or ASA use. In October 2019 she was referred to gastroenterologist for elevated alkaline phosphatase, was under treatment for primary biliary cholangitis and also diagnosed with hepatic fibrosis.     At [facility] her labs were significant for Hgb 3.3, WBC 15.1, BUN 24, Lactic Acid 8.8. EKG revealed sinus tachycardia of 115. CT revealed cirrhotic appearance to liver, multiple gastric varices, large volume ascites, findings concerning with oozing of contrast/blood from colonic mucosa with no discrete bleeding site identified, colonic wall thickening possibly due to liver disease or infection or ischemia or colitis, multiple gallstones, and large pericholecystic fluid difficult to r/o cholecystitis. PRB transfusions and octreotide gtt were started. Patient was transferred to [facility], reportedly for TIPS consideration.      Upon arrival to [facility] patient was receiving reportedly her 2nd PRBC with 2 PIVs for access. She was afebrile, hemodynamically stable with BP in 110s/60s and HR in 100s.  Her only complaints were dry mouth and eye soreness. She denied any additional emesis or BMs since arriving to [facility]. She denied light-headedness, dizziness, abdominal pain, nausea, CP, SOB. She denied history of IVDU or EtOH abuse. She believed she had been diagnosed with cirrhosis before but was unsure. She denied a history of hepatitis. Medications:  Reviewed    Infusion Medications    dextrose      sodium chloride      pantoprozole (PROTONIX) infusion 8 mg/hr (03/14/21 1017)    sodium chloride      sodium chloride       Scheduled Medications    therapeutic multivitamin-minerals  1 tablet Oral Daily    nadolol  10 mg Oral Nightly    [START ON 3/15/2021] pantoprazole  40 mg Intravenous BID    rifaximin  550 mg Oral BID    sodium chloride flush  10 mL Intravenous 2 times per day    sodium chloride flush  10 mL Intravenous 2 times per day    insulin lispro  0-6 Units Subcutaneous Q6H    cefTRIAXone (ROCEPHIN) IV  1,000 mg Intravenous Q24H    levothyroxine  50 mcg Oral Daily     PRN Meds: sodium chloride flush, sodium chloride flush, promethazine **OR** ondansetron, acetaminophen **OR** acetaminophen, glucose, dextrose, glucagon (rDNA), dextrose, sodium chloride, polyvinyl alcohol, sodium chloride, sodium chloride      Intake/Output Summary (Last 24 hours) at 3/14/2021 1304  Last data filed at 3/14/2021 0600  Gross per 24 hour   Intake 401.27 ml   Output 2150 ml   Net -1748.73 ml       Physical Exam Performed:    BP (!) 109/54   Pulse 51   Temp 98 °F (36.7 °C) (Oral)   Resp 15   Ht 5' 4\" (1.626 m)   Wt 114 lb 13.8 oz (52.1 kg)   SpO2 100%   BMI 19.72 kg/m²     General appearance: Pale. No apparent distress, appears stated age and cooperative. HEENT: Scleral icterus. Pupils equal, round, and reactive to light. Neck: Supple, with full range of motion. No jugular venous distention. Trachea midline. Respiratory:  Normal respiratory effort. Clear to auscultation, bilaterally without Rales/Wheezes/Rhonchi.   Cardiovascular: Pending)           Assessment/Plan:    Kamryn Cooney, 67 y.o. female who presented with No chief complaint on file. Active Hospital Problems    Diagnosis Date Noted    Acute GI bleeding [K92.2] 03/12/2021       Decompensated Liver Cirrhosis 2/2 to PBC: EGD w/varices with red jovanni signs; CTA with colonic bleed. Diagnostic paracentesis. - GI consulted; appreciate recs              - Rocephin [3/12 - **; 3/7 days]              - d/c octreotide              - PPI gtt 3d [3/12 - 3/14], switch to BID PPI tomorrow              - low dose nadolol (EGD in a few weeks)              - soft low sodium diet              - held diuretics in the setting of low edema and low pressures/HR              - rifaximin 550 mg BID              - HepB vaccine, AFP, fat soluble vitamins, otpt DEXA              - f/u Mg/Phos/K (nutritional parameters)  - Paracentesis w/1L fluid removed (3/14)     Acute Blood Loss Anemia 2/2 GIB: patient grossly HDS. EGD w/varices with red jovanin signs; CTA with colonic bleed.    - H/H q6H     MIRANDA, resolved -  Likely prerenal  - monitor UOP and I/Os     Hyperglycemia:    - A1c pending  - LDSSI  - POCT glucose AC + qHS     Code Status: Full Code  FEN:  DIET CLEAR LIQUID;  PPX: PPI gtt  DISPO: ICU, transfer to the floor    DVT Prophylaxis: none due to GI bleeding  Diet: DIET LOW SODIUM 2 GM;  Code Status: Full Code  PT/OT Eval Status: pending  Gilbert Bolivar MD   Internal Medicine Resident PGY-1  03/14/21

## 2021-03-14 NOTE — PLAN OF CARE
Problem: Falls - Risk of:  Goal: Will remain free from falls  Description: Will remain free from falls  Outcome: Ongoing   Bed alrm on  Problem:  Bowel Function - Altered:  Goal: Absence of new skin breakdown  Description: Absence of new skin breakdown  Outcome: Ongoing   No diarrhea this shift

## 2021-03-15 ENCOUNTER — APPOINTMENT (OUTPATIENT)
Dept: MRI IMAGING | Age: 76
DRG: 432 | End: 2021-03-15
Attending: INTERNAL MEDICINE
Payer: MEDICARE

## 2021-03-15 ENCOUNTER — APPOINTMENT (OUTPATIENT)
Dept: ULTRASOUND IMAGING | Age: 76
DRG: 432 | End: 2021-03-15
Attending: INTERNAL MEDICINE
Payer: MEDICARE

## 2021-03-15 VITALS
DIASTOLIC BLOOD PRESSURE: 77 MMHG | HEART RATE: 56 BPM | HEIGHT: 64 IN | RESPIRATION RATE: 14 BRPM | BODY MASS INDEX: 19.61 KG/M2 | SYSTOLIC BLOOD PRESSURE: 115 MMHG | WEIGHT: 114.86 LBS | OXYGEN SATURATION: 93 % | TEMPERATURE: 98.8 F

## 2021-03-15 LAB
ALBUMIN SERPL-MCNC: 2.5 G/DL (ref 3.4–5)
ALP BLD-CCNC: 88 U/L (ref 40–129)
ALT SERPL-CCNC: 16 U/L (ref 10–40)
ANION GAP SERPL CALCULATED.3IONS-SCNC: 5 MMOL/L (ref 3–16)
AST SERPL-CCNC: 38 U/L (ref 15–37)
BASOPHILS ABSOLUTE: 0.1 K/UL (ref 0–0.2)
BASOPHILS RELATIVE PERCENT: 0.6 %
BILIRUB SERPL-MCNC: 0.5 MG/DL (ref 0–1)
BILIRUBIN DIRECT: <0.2 MG/DL (ref 0–0.3)
BILIRUBIN, INDIRECT: ABNORMAL MG/DL (ref 0–1)
BUN BLDV-MCNC: 16 MG/DL (ref 7–20)
CALCIUM SERPL-MCNC: 8 MG/DL (ref 8.3–10.6)
CHLORIDE BLD-SCNC: 105 MMOL/L (ref 99–110)
CO2: 24 MMOL/L (ref 21–32)
CREAT SERPL-MCNC: 0.9 MG/DL (ref 0.6–1.2)
EOSINOPHILS ABSOLUTE: 0.8 K/UL (ref 0–0.6)
EOSINOPHILS RELATIVE PERCENT: 9.1 %
GFR AFRICAN AMERICAN: >60
GFR NON-AFRICAN AMERICAN: >60
GLUCOSE BLD-MCNC: 114 MG/DL (ref 70–99)
GLUCOSE BLD-MCNC: 122 MG/DL (ref 70–99)
GLUCOSE BLD-MCNC: 123 MG/DL (ref 70–99)
GLUCOSE BLD-MCNC: 124 MG/DL (ref 70–99)
HCT VFR BLD CALC: 30 % (ref 36–48)
HCT VFR BLD CALC: 30.8 % (ref 36–48)
HEMOGLOBIN: 9.7 G/DL (ref 12–16)
HEMOGLOBIN: 9.7 G/DL (ref 12–16)
LYMPHOCYTES ABSOLUTE: 1.5 K/UL (ref 1–5.1)
LYMPHOCYTES RELATIVE PERCENT: 17.9 %
MAGNESIUM: 1.9 MG/DL (ref 1.8–2.4)
MCH RBC QN AUTO: 27.2 PG (ref 26–34)
MCHC RBC AUTO-ENTMCNC: 32.2 G/DL (ref 31–36)
MCV RBC AUTO: 84.3 FL (ref 80–100)
MONOCYTES ABSOLUTE: 0.6 K/UL (ref 0–1.3)
MONOCYTES RELATIVE PERCENT: 7.2 %
NEUTROPHILS ABSOLUTE: 5.5 K/UL (ref 1.7–7.7)
NEUTROPHILS RELATIVE PERCENT: 65.2 %
PDW BLD-RTO: 16.9 % (ref 12.4–15.4)
PERFORMED ON: ABNORMAL
PLATELET # BLD: 88 K/UL (ref 135–450)
PMV BLD AUTO: 8.2 FL (ref 5–10.5)
POTASSIUM SERPL-SCNC: 4.7 MMOL/L (ref 3.5–5.1)
RBC # BLD: 3.56 M/UL (ref 4–5.2)
SODIUM BLD-SCNC: 134 MMOL/L (ref 136–145)
TOTAL PROTEIN: 5.5 G/DL (ref 6.4–8.2)
WBC # BLD: 8.5 K/UL (ref 4–11)

## 2021-03-15 PROCEDURE — 80076 HEPATIC FUNCTION PANEL: CPT

## 2021-03-15 PROCEDURE — 97165 OT EVAL LOW COMPLEX 30 MIN: CPT

## 2021-03-15 PROCEDURE — 88305 TISSUE EXAM BY PATHOLOGIST: CPT

## 2021-03-15 PROCEDURE — 6360000002 HC RX W HCPCS: Performed by: STUDENT IN AN ORGANIZED HEALTH CARE EDUCATION/TRAINING PROGRAM

## 2021-03-15 PROCEDURE — 2580000003 HC RX 258: Performed by: STUDENT IN AN ORGANIZED HEALTH CARE EDUCATION/TRAINING PROGRAM

## 2021-03-15 PROCEDURE — 74183 MRI ABD W/O CNTR FLWD CNTR: CPT

## 2021-03-15 PROCEDURE — C9113 INJ PANTOPRAZOLE SODIUM, VIA: HCPCS | Performed by: STUDENT IN AN ORGANIZED HEALTH CARE EDUCATION/TRAINING PROGRAM

## 2021-03-15 PROCEDURE — A9581 GADOXETATE DISODIUM INJ: HCPCS | Performed by: RADIOLOGY

## 2021-03-15 PROCEDURE — 6370000000 HC RX 637 (ALT 250 FOR IP): Performed by: STUDENT IN AN ORGANIZED HEALTH CARE EDUCATION/TRAINING PROGRAM

## 2021-03-15 PROCEDURE — 85025 COMPLETE CBC W/AUTO DIFF WBC: CPT

## 2021-03-15 PROCEDURE — 6360000004 HC RX CONTRAST MEDICATION: Performed by: RADIOLOGY

## 2021-03-15 PROCEDURE — 83735 ASSAY OF MAGNESIUM: CPT

## 2021-03-15 PROCEDURE — 76705 ECHO EXAM OF ABDOMEN: CPT

## 2021-03-15 PROCEDURE — 6370000000 HC RX 637 (ALT 250 FOR IP): Performed by: INTERNAL MEDICINE

## 2021-03-15 PROCEDURE — 36592 COLLECT BLOOD FROM PICC: CPT

## 2021-03-15 PROCEDURE — 85018 HEMOGLOBIN: CPT

## 2021-03-15 PROCEDURE — 97162 PT EVAL MOD COMPLEX 30 MIN: CPT

## 2021-03-15 PROCEDURE — 80048 BASIC METABOLIC PNL TOTAL CA: CPT

## 2021-03-15 PROCEDURE — 93975 VASCULAR STUDY: CPT

## 2021-03-15 PROCEDURE — 85014 HEMATOCRIT: CPT

## 2021-03-15 PROCEDURE — 97535 SELF CARE MNGMENT TRAINING: CPT

## 2021-03-15 PROCEDURE — 36415 COLL VENOUS BLD VENIPUNCTURE: CPT

## 2021-03-15 PROCEDURE — 88112 CYTOPATH CELL ENHANCE TECH: CPT

## 2021-03-15 RX ORDER — PANTOPRAZOLE SODIUM 40 MG/1
40 TABLET, DELAYED RELEASE ORAL
Qty: 60 TABLET | Refills: 5 | Status: SHIPPED | OUTPATIENT
Start: 2021-03-15

## 2021-03-15 RX ORDER — CIPROFLOXACIN 500 MG/1
500 TABLET, FILM COATED ORAL 2 TIMES DAILY
Qty: 8 TABLET | Refills: 0 | Status: SHIPPED | OUTPATIENT
Start: 2021-03-15 | End: 2021-03-19

## 2021-03-15 RX ORDER — PANTOPRAZOLE SODIUM 40 MG/1
40 TABLET, DELAYED RELEASE ORAL
Status: DISCONTINUED | OUTPATIENT
Start: 2021-03-15 | End: 2021-03-15 | Stop reason: HOSPADM

## 2021-03-15 RX ADMIN — MULTIPLE VITAMINS W/ MINERALS TAB 1 TABLET: TAB at 09:11

## 2021-03-15 RX ADMIN — PANTOPRAZOLE SODIUM 40 MG: 40 TABLET, DELAYED RELEASE ORAL at 16:13

## 2021-03-15 RX ADMIN — GADOXETATE DISODIUM 7 ML: 181.43 INJECTION, SOLUTION INTRAVENOUS at 14:07

## 2021-03-15 RX ADMIN — SODIUM CHLORIDE 8 MG/HR: 9 INJECTION, SOLUTION INTRAVENOUS at 06:25

## 2021-03-15 RX ADMIN — LEVOTHYROXINE SODIUM 50 MCG: 50 TABLET ORAL at 07:45

## 2021-03-15 RX ADMIN — RIFAXIMIN 550 MG: 550 TABLET ORAL at 09:11

## 2021-03-15 RX ADMIN — PANTOPRAZOLE SODIUM 40 MG: 40 INJECTION, POWDER, FOR SOLUTION INTRAVENOUS at 07:46

## 2021-03-15 RX ADMIN — CEFTRIAXONE 1000 MG: 1 INJECTION, POWDER, FOR SOLUTION INTRAMUSCULAR; INTRAVENOUS at 01:40

## 2021-03-15 RX ADMIN — Medication 10 ML: at 07:49

## 2021-03-15 ASSESSMENT — PAIN SCALES - GENERAL
PAINLEVEL_OUTOF10: 0

## 2021-03-15 NOTE — CONSULTS
Nutrition Note    Subjective:  RD consult for low Na diet edu for aid w/mgt of ascites and cirrhosis. Pt currently out of room at testing. RD left low Na nutrition hanouts at bedside.      Electronically signed by Ace Bui RD, LYRIC on 3/15/21 at 1:54 PM EDT    Contact: [phone]

## 2021-03-15 NOTE — PROGRESS NOTES
Physical Therapy    Facility/Department: [facility] ICU  Initial Assessment and Treatment    NAME: Hubert Spring  : 1945  MRN: [id number]    Date of Service: 3/15/2021    Discharge Recommendations:  Hubert Spring scored a 20/24 on the AM-PAC short mobility form. Current research shows that an AM-PAC score of 18 or greater is typically associated with a discharge to the patient's home setting. Based on the patient's AM-PAC score and their current functional mobility deficits, it is recommended that the patient have 2-3 sessions per week of Physical Therapy at d/c to increase the patient's independence. At this time, this patient demonstrates the endurance and safety to discharge home. Please see assessment section for further patient specific details. PT Equipment Recommendations  Equipment Needed: Yes(Rolling walker- case management notified)    Assessment   Assessment: Pt from home with acute GI bleed. Pt demonstrates generalized weakness from 3 day ICU admission, but moving well overall. Gait is slightly unsteady, which improved using a rolling walker. Recommend walker for home and pt in agreement with plan. Pt has family support at home. Treatment Diagnosis: Decreased gait secondary to acute GI bleed. Decision Making: Medium Complexity  Patient Education: Role of PT and rolling walker recommendation. Pt verbalized understanding. REQUIRES PT FOLLOW UP: Yes       Restrictions  Position Activity Restriction  Other position/activity restrictions: Up with assist     Vision/Hearing  Vision: Within Functional Limits(wears glasses)  Hearing: Within functional limits       Subjective  Chart Reviewed: Yes  Additional Pertinent Hx: Pt to [facility] ED 3/12 with hemetasis. Hgb 3.2. Transferred to [facility] for GI bleed. GI consults. EGD on 3/13 and colonoscopy on 3/14. PMH:  thyroid disease, PNA  Diagnosis: Acute GI bleed    Subjective  Subjective: Pt found supine in bed.   \"I'm just weak because I have been in bed for 3 days. \"  Pain Screening  Patient Currently in Pain: Denies    Orientation  Within Functional Limits    Social/Functional History  Lives With: (granddaughter & great grandson)  Type of Home: House  Home Layout: Two level, Performs ADL's on one level, Able to Live on Main level with bedroom/bathroom  Home Access: Stairs to enter without rails  Entrance Stairs - Number of Steps: 1  Bathroom Shower/Tub: Tub/Shower unit  Bathroom Toilet: Standard  Receives Help From: (none pta)  ADL Assistance: Independent  Homemaking Responsibilities: Yes(splits responsibilities w/ granddaughter)  Ambulation Assistance: Independent(no AD)  Transfer Assistance: Independent  Active : Yes  Occupation: Part time employment  Type of occupation: cleaning houses      Objective  Strength  [facility]  Strength LLE: [facility]    Bed mobility  Supine to Sit: Modified independent(HOB raised)     Transfers  Sit to Stand: Stand by assistance  Stand to sit: Stand by assistance     Ambulation  Device: No Device  Assistance: Contact guard assistance  Quality of Gait: pt with multiple miss-steps, but no overt loss of balance. Gait Deviations: Slow Love;Decreased step length;Decreased step height;Deviated path  Distance: 100ft  Comments: Pt also ambulated 50ft with rolling walker and SBA- steadier gait.     Balance  Sitting - Static: Good  Sitting - Dynamic: Good  Standing - Static: Fair  Standing - Dynamic: Fair(using rolling walker for mobility)    Treatment included gait and transfer training with patient education     Plan   Times per week: 2-5  Current Treatment Recommendations: Transfer Training, Gait Training, Strengthening    Safety Devices  Type of devices: Left in bed(EOB with OT in room for assessment)      AM-PAC Score  AM-PAC Inpatient Mobility Raw Score : 20 (03/15/21 1453)  AM-PAC Inpatient T-Scale Score : 47.67 (03/15/21 1453)  Mobility Inpatient CMS 0-100% Score: 35.83 (03/15/21 1453)  Mobility Inpatient CMS G-Code Modifier : [place] (03/15/21 5969)       Goals  Short term goals  Time Frame for Short term goals: Discharge  Short term goal 1: sit <> stand modified independent  Short term goal 2: ambulate >200ft with/without assistive device supervision  Patient Goals   Patient goals : return home       Therapy Time   Individual Concurrent Group Co-treatment   Time In 1440         Time Out 1455         Minutes 15         Timed Code Treatment Minutes:  0  Total Treatment Minutes:  [address], PT

## 2021-03-15 NOTE — PROGRESS NOTES
Occupational Therapy   Occupational Therapy Initial Assessment & Tx  Date: 3/15/2021   Patient Name: Teo Vaca  MRN: [id number]     : 1945    Date of Service: 3/15/2021    Discharge Recommendations: Teo Vaca scored a 20/24 on the AM-PAC ADL Inpatient form. Current research shows that an AM-PAC score of 18 or greater is typically associated with a discharge to the patient's home setting. OT Equipment Recommendations  Equipment Needed: No    Assessment   Performance deficits / Impairments: Decreased functional mobility ; Decreased balance;Decreased strength;Decreased coordination  Assessment: Pt was initially slightly unsteady during functional mobility out of bed, requiring CGA; with RW, she progressed to SBA for all ADLs & transfers. She would benefit from cont. OT services if she does not DC today to provide RW education for functional transfers & ADLs; otherwise, no further OT services indicated after DC, pt is close to baseline. She plans to return home w/ 24 hr physical assistance available from family - this therapist in agreement with plan. Treatment Diagnosis: Impaired balance & strength for ADLs & transfers  Prognosis: Good  Decision Making: Low Complexity  OT Education: OT Role;Plan of Care;Transfer Training(using RW)  Patient Education: pt verb understanding to all - cont. to educate  REQUIRES OT FOLLOW UP: Yes  Activity Tolerance  Activity Tolerance: Patient Tolerated treatment well  Activity Tolerance: Minimal dizziness reported after initial supine>sit, which subsided. Safety Devices  Safety Devices in place: Yes  Type of devices: Call light within reach;Nurse notified; Left in bed;Bed alarm in place(left sitting EOB, grandson in room)           Patient Diagnosis(es): The primary encounter diagnosis was Acute GI bleeding. Diagnoses of Acute upper GI bleed and Primary biliary cirrhosis (HCC) were also pertinent to this visit.      has a past medical history of Pneumonia and Thyroid disease. has a past surgical history that includes Hysterectomy; Colonoscopy (N/A, 3/12/2021); and Upper gastrointestinal endoscopy (N/A, 3/12/2021). Treatment Diagnosis: Impaired balance & strength for ADLs & transfers      Restrictions  Position Activity Restriction  Other position/activity restrictions: Up with assist    Subjective   General  Chart Reviewed: Yes  Patient assessed for rehabilitation services?: Yes  Additional Pertinent Hx: Hx = minimal  Family / Caregiver Present: Yes(grandson present end of session)  Referring Practitioner: Mary Hammer MD  Diagnosis: 3/12 admit for fatigue & bloody emesis. Had an EGD colonoscopy later that day, results came back normal.  Subjective  Subjective: Pt in bed on arrival, pleasant, making jokes and agreeable to OT.   Patient Currently in Pain: Denies  Social/Functional History  Social/Functional History  Lives With: (granddaughter & great grandson)  Type of Home: House  Home Layout: Two level, Performs ADL's on one level, Able to Live on Main level with bedroom/bathroom  Home Access: Stairs to enter without rails  Entrance Stairs - Number of Steps: 1  Bathroom Shower/Tub: Tub/Shower unit  Bathroom Toilet: Standard  Receives Help From: (none pta)  ADL Assistance: Independent  Homemaking Responsibilities: Yes(splits responsibilities w/ granddaughter)  Ambulation Assistance: Independent(no AD)  Transfer Assistance: Independent  Active : Yes  Occupation: Part time employment  Type of occupation: cleaning houses  Additional Comments: reports no recent falls       Objective   Vision: Within Functional Limits(had cataracts removed)  Hearing: Within functional limits    Orientation  Overall Orientation Status: Within Normal Limits     Balance  Sitting Balance: Supervision  Standing Balance: Stand by assistance  Standing Balance  Time: up to 2 min  Activity: hallway ambulation; bathroom ADLs  Functional Mobility  Functional - Mobility Device: (initially no device; benefitted from RW)  Activity: To/from bathroom(& 1 medium lap around hallway)  Assist Level: (initially CGA, minimal unsteadiness & veering L/R; improved steadiness w/ use of RW)  Toilet Transfers  Toilet - Technique: Ambulating  Equipment Used: Standard toilet  Toilet Transfer: Stand by assistance(pt appeared slightly more confused on technique with 1st time using RW)  ADL  Grooming: Stand by assistance(washing hands sinkside in stance)  UE Dressing: Stand by assistance(don shirt)  LE Dressing: Stand by assistance(don underwear & pants)  Toileting: Stand by assistance(urine output on toilet, w/ pants management)        Bed mobility  Supine to Sit: Modified independent(HOB raised)  Sit to Supine: Modified independent  Transfers  Sit to stand: Stand by assistance  Stand to sit: Stand by assistance  Transfer Comments: req. VC for education on hand placement w/ RW; minimal carryover  Vision - Basic Assessment  Prior Vision: No visual deficits  Visual History: Cataracts; Corrective eye surgery  Cognition  Overall Cognitive Status: WNL  Cognition Comment: intermittent VC for problem-solving  Perception  Overall Perceptual Status: WFL     Sensation  Overall Sensation Status: WFL        LUE AROM (degrees)  LUE AROM : WFL  RUE AROM (degrees)  RUE AROM : WFL        Hand Assessment Comment  Hand Assessment Comment: pt has RA at baseline; WFL to complete ADLs             Plan   Plan  Times per week: 2-5x  Specific instructions for Next Treatment: RW education for ADLs & transfers    G-Code     OutComes Score                                                  AM-PAC Score        AM-PAC Inpatient Daily Activity Raw Score: 20 (03/15/21 1512)  AM-PAC Inpatient ADL T-Scale Score : 42.03 (03/15/21 1512)  ADL Inpatient CMS 0-100% Score: 38.32 (03/15/21 1512)  ADL Inpatient CMS G-Code Modifier : Maia Nelson (03/15/21 1512)    Goals  Short term goals  Time Frame for Short term goals: go home  Short term goal 1: Pt will complete 3 functional transfers at SUP level w/ no VC for hand placement  Short term goal 2: Pt will complete LE dressing task at SUP level  Patient Goals   Patient goals : none stated       Therapy Time   Individual Concurrent Group Co-treatment   Time In 1433         Time Out 1503         Minutes 30              Timed Code Treatment Minutes:  15     Total Treatment Minutes:  [address] Elaine Solares, s/OT  Arslan Mood OTR/L #[id number]  Therapist was present, directed the patient's care, made skilled judgement, and was responsible for assessment and treatment of the patient.

## 2021-03-15 NOTE — PROGRESS NOTES
D/C order received. Pt seen by PT/OT and walker was delivered. D/C instructions reviewed with pt & her grandson. Questions were answered and pt was able to teach back d/c instructions. Pt is aware she needs to make f/u appointments with PCP and her [facility] gastro[facility]ologist.  Pt instructed to avoid alcohol and NSAIDs and follow a low sodium diet. IVs removed prior to d/c.

## 2021-03-15 NOTE — PROGRESS NOTES
Internal Medicine MS3 Progress Note        PCP: Romayne Aid, MD    Date of Admission: 3/12/2021    Chief Complaint: hematemesis and melena    Hospital Course: Day 4    Subjective: Interval History:  Patient seen at bedside. Currently eating breakfast. She endorses no complaints and appears in good spirits. Denies any headache, chest pain, SOB, abdominal pain or swelling, changes in bowel habits, or loss of appetite. On 1L NC. Medications:  Reviewed    Infusion Medications    dextrose      sodium chloride      sodium chloride      sodium chloride       Scheduled Medications    pantoprazole  40 mg Oral BID AC    therapeutic multivitamin-minerals  1 tablet Oral Daily    rifaximin  550 mg Oral BID    sodium chloride flush  10 mL Intravenous 2 times per day    sodium chloride flush  10 mL Intravenous 2 times per day    insulin lispro  0-6 Units Subcutaneous Q6H    cefTRIAXone (ROCEPHIN) IV  1,000 mg Intravenous Q24H    levothyroxine  50 mcg Oral Daily     PRN Meds: sodium chloride flush, sodium chloride flush, promethazine **OR** ondansetron, acetaminophen **OR** acetaminophen, glucose, dextrose, glucagon (rDNA), dextrose, sodium chloride, polyvinyl alcohol, sodium chloride, sodium chloride      Intake/Output Summary (Last 24 hours) at 3/15/2021 0900  Last data filed at 3/15/2021 0753  Gross per 24 hour   Intake 623.94 ml   Output 1350 ml   Net -726.06 ml       Physical Exam Performed:    BP (!) 112/56   Pulse 54   Temp 98.7 °F (37.1 °C) (Oral)   Resp 20   Ht 5' 4\" (1.626 m)   Wt 114 lb 13.8 oz (52.1 kg)   SpO2 94%   BMI 19.72 kg/m²     General appearance: No apparent distress, appears stated age and cooperative. HEENT: Pupils equal, round, and reactive to light. Conjunctivae/corneas clear. Neck: Supple, with full range of motion. No jugular venous distention. Trachea midline. Respiratory:  Normal respiratory effort. Clear to auscultation, bilaterally without Rales/Wheezes/Rhonchi. Cardiovascular: Regular rate and rhythm with normal S1/S2 without murmurs, rubs or gallops. Abdomen: Soft, non-tender, non-distended with normal bowel sounds. Musculoskeletal: No clubbing, cyanosis or edema bilaterally. Full range of motion without deformity. Skin: Skin color, texture, turgor normal.  No rashes or lesions. Neurologic:  Neurovascularly intact without any focal sensory/motor deficits. Cranial nerves: II-XII intact, grossly non-focal.  Psychiatric: Alert and oriented, thought content appropriate, normal insight  Capillary Refill: Brisk,< 3 seconds   Peripheral Pulses: +2 palpable, equal bilaterally       Labs:   Recent Labs     03/13/21  0502 03/13/21  0502 03/14/21  0815 03/14/21  0815 03/14/21  2000 03/15/21  0433 03/15/21  0434   WBC 8.0  --  9.5  --   --   --  8.5   HGB 9.2*   < > 9.9*   < > 9.9* 9.7* 9.7*   HCT 27.7*   < > 30.5*   < > 31.0* 30.8* 30.0*   PLT 99*  --  95*  --   --   --  88*    < > = values in this interval not displayed. Recent Labs     03/13/21  0502 03/14/21  0440 03/15/21  0433    138 134*   K 3.8 3.3* 4.7    104 105   CO2 20* 26 24   BUN 18 15 16   CREATININE 0.9 1.0 0.9   CALCIUM 7.8* 7.9* 8.0*     Recent Labs     03/13/21  0502 03/14/21  0440 03/15/21  0433   AST 32 28 38*   ALT 19 17 16   BILIDIR <0.2 <0.2 <0.2   BILITOT 0.6 0.6 0.5   ALKPHOS 85 84 88     Recent Labs     03/12/21  1010 03/14/21  0815   INR 1.14 1.14     No results for input(s): Hermelinda Older in the last 72 hours. Urinalysis:    No results found for: Adriana Leal, BACTERIA, RBCUA, BLOODU, [address], [place] [address]    Radiology:  XR CHEST PORTABLE   Final Result      Right jugular central venous catheter tip in the low SVC without pneumothorax. Diffuse interstitial prominence particularly lung bases, most likely chronic interstitial lung disease/fibrosis. Normal cardiomediastinal silhouette.       US ABDOMEN LIMITED    (Results Pending)   US DUP ABD PEL RETRO SCROT COMPLETE (Results Pending)           Assessment/Plan:    Claudette Ali, 67 y.o. female w/ PMHx of liver cirrhosis, PBC, and hypothyroidism. Admitted for acute blood loss anemia 2/2 to GI bleed. Active Hospital Problems    Diagnosis Date Noted    Acute GI bleeding [K92.2] 03/12/2021     1. Acute blood loss anemia 2/2 variceal bleed:   Hgb 3.3 on presentation. EGD (3/12) showed varices with red whale signs. CT Abdomen pelvis (3/12) showed colonic bleed. - s/p blood transfusion  - s/p band placement x 6 (3/13)  - Hgb stable at 9.7  - H&H q6hrs, transfuse if less than 7  - discontinued octreotide yesterday  - s/p protonix drip, started on PO protonix 40 mg BID this morning  - continue IV rocephin 1000mg q24hrs  - GI following    2. Decompensated liver cirrhosis 2/2 PBC  - GI consulted, appropriated recs    - Continue rocephin for 3-7 days (started 3/12)   - d/c octreotide   - continue rifaximin 550 mg PO 2 x daily    - currently holding nadolol 2/2 to low BP and HR   - currently holding diuretics    - Hep B vaccine given 3/13, Hep A immune   - AFP pending   - monitor electrolytes   - Diagnostic Paracentesis (3/14): 1L fluid removed   -Fluid analysis: glucose, protein, LDH, albumin wnl   -Fluid culture: NGTD    3. MIRANDA: Resolved  Likely prerenal  - monitor UOP and I's/O's    4.  Hyperglycemia  - A1c 5.3%  - LDSSI  - POCT glucose AC and qHS            DVT Prophylaxis: ***  Diet: DIET LOW SODIUM 2 GM;  Code Status: Full Code  PT/OT Eval Status: ***  Dispo - ***    I will discuss the patient with the senior resident and MD Miranda Engel, MS3

## 2021-03-15 NOTE — PROGRESS NOTES
(36.6 °C) Axillary 89 25 (!) 88 %   21 1600 120/76 97.6 °F (36.4 °C) Oral 57 17 93 %   21 1500 108/70   55 20 (!) 79 %   21 1400 (!) 113/59   54 24 100 %   21 1300 123/69   56 16    21 1218  97.7 °F (36.5 °C) Oral      21 1200 (!) 109/54   51 15 100 %       Exam:  VITALS:  /61   Pulse 59   Temp 98.7 °F (37.1 °C) (Oral)   Resp 20   Ht 5' 4\" (1.626 m)   Wt 114 lb 13.8 oz (52.1 kg)   SpO2 92%   BMI 19.72 kg/m²   TEMPERATURE:  Current - Temp: 98.7 °F (37.1 °C); Max - Temp  Av.9 °F (36.6 °C)  Min: 97.6 °F (36.4 °C)  Max: 98.7 °F (37.1 °C)    NAD  General appearance: alert, appears stated age, cooperative and no distress  Head: Normocephalic, without obvious abnormality, atraumatic  Neck: supple, symmetrical, trachea midline and thyroid not enlarged, symmetric, no tenderness/mass/nodules  CVS:  RRR, Nl s1s2  Lungs CTA Bilaterally, normal effort  Abdomen: soft, non tender, distended, +BS  AAOx3, No asterixis or encephalopathy  Extremities: No edema      Recent Labs     21  0502 21  05021  0815 21  0815 03/14/21  2000 03/15/21  0433 03/15/21  0434   WBC 8.0  --  9.5  --   --   --  8.5   HGB 9.2*   < > 9.9*   < > 9.9* 9.7* 9.7*   HCT 27.7*   < > 30.5*   < > 31.0* 30.8* 30.0*   MCV 83.5  --  84.9  --   --   --  84.3   PLT 99*  --  95*  --   --   --  88*    < > = values in this interval not displayed. Recent Labs     21  0502 21  0440 03/15/21  0433    138 134*   K 3.8 3.3* 4.7    104 105   CO2 20* 26 24   BUN 18 15 16   CREATININE 0.9 1.0 0.9     Recent Labs     21  0502 21  0440 03/15/21  0433   AST 32 28 38*   ALT 19 17 16   BILIDIR <0.2 <0.2 <0.2   BILITOT 0.6 0.6 0.5   ALKPHOS 85 84 88     No results for input(s): LIPASE, AMYLASE in the last 72 hours.   Recent Labs     21  1010 21  1010 21  0502 21  0440 21  0815 03/15/21  0433   PROT  --    < > 5.4* 5.4*  -- 5.5*   INR 1.14  --   --   --  1.14  --     < > = values in this interval not displayed. No results for input(s): PTT in the last 72 hours. No results for input(s): OCCULTBLD in the last 72 hours. Radiology review: as noted previously    Assessment:       Active Problems:    Acute GI bleeding  Resolved Problems:    * No resolved hospital problems.  *        Recommendations:       - Continue low salt diet  - Ascitic fluid analysis reveals SAAG >1.1 consistent with ascites from portal HTN  - US with doppler did not report any thrombus in portal vein but showed gallstones in GB  - Needs surgical follow up as well as MRI liver protocol outpatient to evaluate for any mass lesions in liver  - Received one dose of hepB vaccine inpatient, will need to follow up on next dose outpatient  - Will need DEXA scan outpatient  - Continue lasix and aldactone, rifaximin 550 BID  - Can change ceftriaxone to po norfloxacin and complete 7 days of abx total  - Follow up with primary GI at [facility] one week after discharge, ok to dc from GI standpoint      Serena Knight, PGY-2  3/15/2021  11:09 AM    Will discuss with Dr. Shanna Chandra

## 2021-03-15 NOTE — CARE COORDINATION
Spoke with [employer] and pt is off O2, off protonix drip and has had her US ABD. She may be d/c'd to home today. I stopped by and she denies needs for home. Her grand-daughter lives with her and will be there 24/7 and she will have transport to home.      Electronically signed by Mike Freire RN on 3/15/2021 at 11:40 AM
Completed: Not Indicated    Notification completed in HENS/PAS?:  Not Applicable    IMM Completed:   Yes, Case management has presented and reviewed IMM letter #2 to the patient and/or family/ POA. Patient and/or family/POA verbalized understanding of their medicare rights and appeal process if needed. Patient and/or family/POA has signed, initialed and placed today's date (03/15/2021) and time ([address]) on IMM letter #2 on the the appropriate lines. Patient and/or family/POA, copy of letter offered and they are aware that this original copy of IMM letter #2 is available prior to discharge from the paper chart on the unit. Electronic documentation has been entered into epic for IMM letter #2 and original paper copy has been added to the paper chart at the nurses station. Transportation:  Transportation PLAN for discharge: family   Mode of Transport: Private Car  The Plan for Transition of Care is related to the following treatment goals of Acute GI bleeding [K92.2]    The Patient and/or patient representative Jos Hampton and her family were provided with a choice of provider and agrees with the discharge plan Not Indicated    Freedom of choice list was provided with basic dialogue that supports the patient's individualized plan of care/goals and shares the quality data associated with the providers.  Not Indicated    Care Transitions patient: No    Clearance MARA Cerda  [facility]  Case Management Department  Ph: [phone]  Fax: [phone]
drug card and patient face sheet must be sent along with the prescription(s)  4. Cost of Rx cannot be added to hospital bill. If financial assistance is needed, please contact unit  or ;  or  CANNOT provide pharmacy voucher for patients co-pays  5. Patients can then  the prescription on their way out of the hospital at discharge, or pharmacy can deliver to the bedside if staff is available. (payment due at time of pick-up or delivery - cash, check, or card accepted)     Able to afford home medications/ co-pay costs: Yes    ADLS  Support Systems:      PT AM-PAC:   /24  OT AM-PAC:   /24    [place]: house  Steps: unsure    Plans to RETURN to current housing: Yes  Barriers to RETURNING to current housing: none noted    DISCHARGE PLAN:  Disposition: Home- No Services Needed    Transportation PLAN for discharge: family     Factors facilitating achievement of predicted outcomes: Family support, Cooperative and Pleasant    Barriers to discharge: EGD and colonoscopy today    Additional Case Management Notes: NA    The Plan for Transition of Care is related to the following treatment goals of Acute GI bleeding [K92.2]    The Patient and/or patient representative Dulce Bhatti and her family were provided with a choice of provider and agrees with the discharge plan Not Indicated    Freedom of choice list was provided with basic dialogue that supports the patient's individualized plan of care/goals and shares the quality data associated with the providers.  Not Indicated    Care Transition patient: No    Shahida Chacon RN  [facility]  Case Management Department  Ph: [phone]   Fax: [phone]

## 2021-03-15 NOTE — DISCHARGE SUMMARY
Hospital Medicine Discharge Summary    Patient ID: Aida Mohs   Gender: female  : 1945   Age: 67 y.o. MRN: [id number]  Code Status: Full Code    Patient's PCP: Camille Stinson MD    Admit Date: 3/12/2021     Discharge Date:   3.15/2021    Admitting Physician: Luli Bhatia MD     Discharge Physician: Bj Castro MD     Discharge Diagnoses:  - Decompensated Liver Cirrhosis 2/2 to PBC  - Acute Blood Loss Anemia 2/2 GIB  - MIRANDA  - Hyperglycemia       Active Hospital Problems    Diagnosis Date Noted    Acute GI bleeding [K92.2] 2021       The patient was seen and examined on day of discharge and this discharge summary is in conjunction with any daily progress note from day of discharge. Hospital Course:   Abigail Benitez is a 68 yo F w/ PMH of liver cirrhosis, PBC, and hypothyroidism who presented to [facility] with hematemesis and melena. She reports experiencing 2 episodes of both red bloody emesis and black diarrhea yesterday around 1300 and 2200. Prior to that she reports 2 weeks of sinus drainage, occasional headaches, fatigue and weakness. She saw a PCP 2 weeks ago, reports she started Z-pack and prednisone but did not complete courses. States she may have taken 2 doses of ibuprofen over the past 2 weeks. Denies AC or ASA use. In 2019 she was referred to gastroenterologist for elevated alkaline phosphatase, was under treatment for primary biliary cholangitis and also diagnosed with hepatic fibrosis.     At [facility] her labs were significant for Hgb 3.3, WBC 15.1, BUN 24, Lactic Acid 8.8. EKG revealed sinus tachycardia of 115.  CT revealed cirrhotic appearance to liver, multiple gastric varices, large volume ascites, findings concerning with oozing of contrast/blood from colonic mucosa with no discrete bleeding site identified, colonic wall thickening possibly due to liver disease or infection or ischemia or colitis, multiple gallstones, and large pericholecystic fluid difficult to r/o cholecystitis. PRB transfusions and octreotide gtt were started. Patient was transferred to [facility], reportedly for TIPS consideration.      Upon arrival to [facility] patient was receiving reportedly her 2nd PRBC with 2 PIVs for access. She was afebrile, hemodynamically stable with BP in 110s/60s and HR in 100s. Her only complaints were dry mouth and eye soreness. She denied any additional emesis or BMs since arriving to [facility]. She denied light-headedness, dizziness, abdominal pain, nausea, CP, SOB. She denied history of IVDU or EtOH abuse. She believed she had been diagnosed with cirrhosis before but was unsure. She denied a history of hepatitis. The GI team was consulted to evaluate pt. Pt had EGD and colonoscopy performed on 3/12. The esophagus had grade II varices with red jovanni signs. There were placed 6 bands starting at the [facility] and working proximal in a circumferential manner. Pt also had paracentesis performed for the ascites. Recommended to continue low salt diet. Ascitic fluid analysis reveals SAAG >1.1 consistent with ascites from portal HTN. US with doppler did not report any thrombus in portal vein but showed gallstones in GB. Needs surgical follow up - Received one dose of hepB vaccine inpatient, will need to follow up on next dose outpatient. Will need DEXA scan outpatient. Continue lasix and aldactone, rifaximin 550 BID. Pt is to complete 7 days of abx total. She completed 3 days in hospital of ceftriaxone and is to complete 4 more days of antibiotics at home after discharge. Pt had MRI abdomen to evaluate for any mass lesions in liver today. Pt is to follow up with primary GI at [address] [place] [address] one week after discharge    On day of discharge pt is stable and resting comfortably in bed without any complaints. Oxygenating well on room air. Denies nausea, vomiting, chest pain, shortness of breath, fevers, chills, lightheadedness, dizziness.       Disposition:  Home    Physical Exam Performed: compatible cirrhosis. Underlying mass lesions are not excluded. There appear to be areas of focal fatty alteration or fatty sparing. Follow-up MRI of the liver with and without contrast is    recommended. 5. Duplex Doppler evaluation demonstrates no significant vascular abnormality. US DUP ABD PEL RETRO SCROT COMPLETE   Final Result   1. Extensive cholelithiasis with gallbladder wall thickening which is nonspecific in the setting of ascites. Cholecystitis is not excluded. 2. Moderate ascites. 3. No biliary ductal dilation. 4. Heterogeneous appearance the liver with nodular contour compatible cirrhosis. Underlying mass lesions are not excluded. There appear to be areas of focal fatty alteration or fatty sparing. Follow-up MRI of the liver with and without contrast is    recommended. 5. Duplex Doppler evaluation demonstrates no significant vascular abnormality. XR CHEST PORTABLE   Final Result      Right jugular central venous catheter tip in the low SVC without pneumothorax. Diffuse interstitial prominence particularly lung bases, most likely chronic interstitial lung disease/fibrosis. Normal cardiomediastinal silhouette. MRI ABDOMEN W WO CONTRAST    (Results Pending)          Consults:     IP CONSULT TO GI  IP CONSULT TO CRITICAL CARE  IP CONSULT TO DIETITIAN    Disposition:  Home     Condition at Discharge: Stable    Discharge Instructions/Follow-up:  Please make an appointment with your PCP for the hospital visit follow up as soon as possible. Please follow instructions as follows:  - Continue low salt diet.   - follow up on the next two doses of Hep B  - obtain DEXA scan outpatient  - Continue rifaximin 550 BID  - Complete 4 more days of ciprofloxacin and complete the full course  - Follow up with primary GI Dr. Amanda Larios at [address] [place] [address] one week after discharge  - have EGD performed in 4 weeks as outpatient      Code Status:  Full Code     Activity: activity as tolerated    Diet:

## 2021-03-16 ENCOUNTER — CARE COORDINATION (OUTPATIENT)
Dept: CASE MANAGEMENT | Age: 76
End: 2021-03-16

## 2021-03-16 LAB
AFP: 2.8 UG/L
AFP: 4.1 UG/L
BLOOD BANK DISPENSE STATUS: NORMAL
BLOOD BANK PRODUCT CODE: NORMAL
BODY FLUID CULTURE, STERILE: NORMAL
BPU ID: NORMAL
DESCRIPTION BLOOD BANK: NORMAL
GRAM STAIN RESULT: NORMAL

## 2021-03-16 NOTE — CARE COORDINATION
Patient contacted regarding Roshan Varma. Care Transition Nurse contacted the patient by telephone to perform post discharge assessment. Call within 2 business days of discharge: Yes. Verified name and  with patient as identifiers. Provided introduction to self, and explanation of the CTN/ACM role, and reason for call due to risk factors for infection and/or exposure to COVID-19. Symptoms reviewed with patient who verbalized the following symptoms: no new symptoms and no worsening symptoms. Due to no new or worsening symptoms encounter was not routed to provider for escalation. Discussed follow-up appointments. If no appointment was previously scheduled, appointment scheduling offered: Yes and No  [facility] follow up appointment(s): No future appointments. Non-face-to-face services provided:  Obtained and reviewed discharge summary and/or continuity of care documents  Education of patient/family/caregiver/guardian to [address] [place] [address]. Assessment and support for treatment adherence and medication management-. .     Advance Care Planning:   Does patient have an Advance Directive:  not on file. Patient has following risk factors of: pneumonia. CTN reviewed discharge instructions, medical action plan and red flags such as increased shortness of breath, increasing fever and signs of decompensation with patient who verbalized understanding. Discussed exposure protocols and quarantine with CDC Guidelines What to do if you are sick with coronavirus disease .  Patient was given an opportunity for questions and concerns. The patient agrees to contact the [facility] exposure line [phone], local [facility] department [place] [facility]: ([phone]) and PCP office for questions related to their healthcare. CTN provided contact information for future needs.     Reviewed and educated patient on any new and changed medications related to discharge diagnosis     Was patient discharged with a pulse oximeter? No Discussed and confirmed pulse oximeter discharge instructions and when to notify provider or seek emergency care. CTN spoke with patient this am for initial COVID -19 call. Patient states she is doing well, reports no more bleeding, no fever, chills, nausea, vomiting, chest pain, SOB or cough. Patient instructed to continue to monitor for any of the above s/s, as well as anymore bleeding, reporting to MD immediately. Patient states she was able to  Cipro and Pantoprazole, but did not get Xifaxan, needs prior Auth. Patient states she would like to have medication filled at [facility], CTN will follow up with [facility]. to see if that is possible. No other issues or concerns at this time. CTN spoke with [facility]. Pharmacist, states patient can have medication filled at pharmacy, will need to bring in script as she was given a paper script. CTN contacted patient again, made her aware of the above. Plan for follow-up call in 3-5 days based on severity of symptoms and risk factors.     Thank Emily Lanza RN  Care Transition Coordinator  Contact DANNY:[phone]

## 2021-03-17 LAB
RETINYL PALMITATE: <0.02 MG/L (ref 0–0.1)
VITAMIN A LEVEL: 0.27 MG/L (ref 0.3–1.2)
VITAMIN A, INTERP: ABNORMAL

## 2021-03-24 ENCOUNTER — CARE COORDINATION (OUTPATIENT)
Dept: CASE MANAGEMENT | Age: 76
End: 2021-03-24

## 2021-03-24 NOTE — CARE COORDINATION
[facility] Transitions Initial Follow Up Call    3/24/21    Patient:  Shahbaz Loss Patient :  1945  MRN:  [id number]   Reason for Admission:  covid 19   Discharge Date:  3/15/21  RARS:       CTC attempt to reach Pt regarding recent hospital discharge. CTC left voice recording with call back number requesting a call back. Follow up appointments:    No future appointments. ALEX He, RN  Care Transition Coordinator  Contact Number:  [phone]

## 2021-03-31 ENCOUNTER — CARE COORDINATION (OUTPATIENT)
Dept: CASE MANAGEMENT | Age: 76
End: 2021-03-31

## 2021-03-31 NOTE — CARE COORDINATION
[facility] Transitions Initial Follow Up Call    3/31/21    Patient:  Brigitte Mendoza Patient :  1945  MRN:  [id number]   Reason for Admission:  covid 19   Discharge Date:  3/15/21  RARS:       CTC attempt to reach Pt regarding recent hospital discharge. CTC left voice recording with call back number requesting a call back. Follow up appointments:    No future appointments. ALEX Lopes, RN  Care Transition Coordinator  Contact Number:  [phone]

## 2021-07-12 ENCOUNTER — HOSPITAL ENCOUNTER (OUTPATIENT)
Dept: WOMENS IMAGING | Age: 76
Discharge: HOME | End: 2021-07-12
Payer: MEDICARE

## 2021-07-12 VITALS — BODY MASS INDEX: 18.78 KG/M2 | HEIGHT: 64 IN | WEIGHT: 110 LBS

## 2021-07-12 DIAGNOSIS — Z12.31 VISIT FOR SCREENING MAMMOGRAM: ICD-10-CM

## 2021-07-12 PROCEDURE — 77063 BREAST TOMOSYNTHESIS BI: CPT

## 2021-11-16 NOTE — PROGRESS NOTES
Linda Fury    Age 67 y.o.    female    1945    MRN [id number]    11/24/2021  Arrival Time_____________  OR Time____________[facility]     Procedure(s):  EGD                      General    Surgeon(s):  [facility], MD       Phone [phone] (home) [phone] (work)    [address] [place]  Cell         Work  _____________________________________________________________________  _____________________________________________________________________  _____________________________________________________________________  _____________________________________________________________________  _____________________________________________________________________    PCP _____________________________ Phone_________________     H&P__________________Bringing      Chart            Epic   DOS      Called________  EKG__________________Bringing      Chart            Epic   DOS      Called________  LAB__________________ Bringing      Chart            Epic   DOS      Called________  Cardiac Clearance_______Bringing      Chart            Epic      DOS      Called________    Cardiologist________________________ Phone___________________________    ? Islam concerns / Waiver on Chart            PAT Communications________________  ? Pre-op Instructions Given [place]          _________________________________  ? Directions to Surgery Center                          _________________________________  ? Transportation Home_______________      __________________________________  ?  Crutches/Walker__________________        __________________________________    ________Pre-op Orders   _______Transcribed    _______Wt.  ________Pharmacy          _______SCD  ______VTE     ______TED Idelia Pill  _______  Surgery Consent    _______  Anesthesia Consent         COVID DATE______________LOCATION________________ RESULT__________

## 2021-11-22 ENCOUNTER — HOSPITAL ENCOUNTER (OUTPATIENT)
Age: 76
Discharge: HOME | End: 2021-11-26
Payer: MEDICARE

## 2021-11-22 PROCEDURE — U0005 INFEC AGEN DETEC AMPLI PROBE: HCPCS

## 2021-11-22 PROCEDURE — U0003 INFECTIOUS AGENT DETECTION BY NUCLEIC ACID (DNA OR RNA); SEVERE ACUTE RESPIRATORY SYNDROME CORONAVIRUS 2 (SARS-COV-2) (CORONAVIRUS DISEASE [COVID-19]), AMPLIFIED PROBE TECHNIQUE, MAKING USE OF HIGH THROUGHPUT TECHNOLOGIES AS DESCRIBED BY CMS-2020-01-R: HCPCS

## 2021-11-23 LAB — SARS-COV-2: NOT DETECTED

## 2021-11-23 RX ORDER — URSODIOL 500 MG/1
750 TABLET, FILM COATED ORAL DAILY
COMMUNITY

## 2021-11-23 NOTE — PROGRESS NOTES
Obstructive Sleep Apnea (LINDA) Screening     Patient:  Raymundo Schwab    YOB: 1945      Medical Record #:  [id number]                     Date:  11/23/2021     1. Are you a loud and/or regular snorer? []  Yes       [x] No    2. Have you been observed to gasp or stop breathing during sleep? []  Yes       [x] No    3. Do you feel tired or groggy upon awakening or do you awaken with a headache?           []  Yes       [] No    4. Are you often tired or fatigued during the wake time hours? []  Yes       [] No    5. Do you fall asleep sitting, reading, watching TV or driving? []  Yes       [] No    6. Do you often have problems with memory or concentration? []  Yes       [] No    **If patient's score is ? 3 they are considered high risk for LINDA. An Anesthesia provider will evaluate the patient and develop a plan of care the day of surgery. Note:  If the patient's BMI is more than 35 kg m¯² , has neck circumference > 40 cm, and/or high blood pressure the risk is greater (© American Sleep Apnea Association, 2006).

## 2021-11-23 NOTE — PROGRESS NOTES
Preoperative Screening for Elective Surgery/Invasive Procedures While COVID-19 present in the community     Have you had any of the following symptoms? No  o Fever, chills  o Cough  o Shortness of breath  o Muscle aches/pain  o Diarrhea  o Abdominal pain, nausea, vomiting  o Loss or decrease in taste and / or smell   Risk of Exposure  o Have you recently been hospitalized for COVID-19 or flu-like illness, if so when? No  o Recently diagnosed with COVID-19, if so when? No  o Recently tested for COVID-19, if so when? 11/22/21  o Have you been in close contact with a person or family member who currently has or recently had COVID-23? If yes, when and in what context? No  o Do you live with anybody who in the last 14 days has had fever, chills, shortness of breath, muscle aches, flu-like illness? No  o Do you have any close contacts or family members who are currently in the hospital for COVID-19 or flu-like illness? No  If yes, assess recent close contact with this person. Indicate if the patient has a positive screen by answering yes to one or more of the above questions. Patients who test positive or screen positive prior to surgery or on the day of surgery should be evaluated in conjunction with the surgeon/proceduralist/anesthesiologist to determine the urgency of the procedure.

## 2021-11-24 ENCOUNTER — ANESTHESIA (OUTPATIENT)
Dept: ENDOSCOPY | Age: 76
End: 2021-11-24
Payer: MEDICARE

## 2021-11-24 ENCOUNTER — ANESTHESIA EVENT (OUTPATIENT)
Dept: ENDOSCOPY | Age: 76
End: 2021-11-24
Payer: MEDICARE

## 2021-11-24 ENCOUNTER — HOSPITAL ENCOUNTER (OUTPATIENT)
Age: 76
Setting detail: OUTPATIENT SURGERY
Discharge: HOME | End: 2021-11-24
Attending: INTERNAL MEDICINE | Admitting: INTERNAL MEDICINE
Payer: MEDICARE

## 2021-11-24 VITALS
SYSTOLIC BLOOD PRESSURE: 132 MMHG | HEART RATE: 68 BPM | TEMPERATURE: 97.7 F | HEIGHT: 64 IN | DIASTOLIC BLOOD PRESSURE: 64 MMHG | OXYGEN SATURATION: 99 % | BODY MASS INDEX: 18.44 KG/M2 | RESPIRATION RATE: 16 BRPM | WEIGHT: 108 LBS

## 2021-11-24 VITALS — DIASTOLIC BLOOD PRESSURE: 63 MMHG | SYSTOLIC BLOOD PRESSURE: 111 MMHG | OXYGEN SATURATION: 97 %

## 2021-11-24 PROCEDURE — 2580000003 HC RX 258: Performed by: INTERNAL MEDICINE

## 2021-11-24 PROCEDURE — 2500000003 HC RX 250 WO HCPCS: Performed by: NURSE ANESTHETIST, CERTIFIED REGISTERED

## 2021-11-24 PROCEDURE — 7100000010 HC PHASE II RECOVERY - FIRST 15 MIN: Performed by: INTERNAL MEDICINE

## 2021-11-24 PROCEDURE — 2709999900 HC NON-CHARGEABLE SUPPLY: Performed by: INTERNAL MEDICINE

## 2021-11-24 PROCEDURE — 2580000003 HC RX 258: Performed by: NURSE ANESTHETIST, CERTIFIED REGISTERED

## 2021-11-24 PROCEDURE — 2720000010 HC SURG SUPPLY STERILE: Performed by: INTERNAL MEDICINE

## 2021-11-24 PROCEDURE — 3609012300 HC EGD BAND LIGATION ESOPHGEAL/GASTRIC VARICES: Performed by: INTERNAL MEDICINE

## 2021-11-24 PROCEDURE — 6360000002 HC RX W HCPCS: Performed by: NURSE ANESTHETIST, CERTIFIED REGISTERED

## 2021-11-24 PROCEDURE — 3700000000 HC ANESTHESIA ATTENDED CARE: Performed by: INTERNAL MEDICINE

## 2021-11-24 PROCEDURE — 7100000011 HC PHASE II RECOVERY - ADDTL 15 MIN: Performed by: INTERNAL MEDICINE

## 2021-11-24 PROCEDURE — 3700000001 HC ADD 15 MINUTES (ANESTHESIA): Performed by: INTERNAL MEDICINE

## 2021-11-24 RX ORDER — SODIUM CHLORIDE, SODIUM LACTATE, POTASSIUM CHLORIDE, CALCIUM CHLORIDE 600; 310; 30; 20 MG/100ML; MG/100ML; MG/100ML; MG/100ML
INJECTION, SOLUTION INTRAVENOUS CONTINUOUS PRN
Status: DISCONTINUED | OUTPATIENT
Start: 2021-11-24 | End: 2021-11-24 | Stop reason: SDUPTHER

## 2021-11-24 RX ORDER — LIDOCAINE HYDROCHLORIDE 10 MG/ML
0.1 INJECTION, SOLUTION EPIDURAL; INFILTRATION; INTRACAUDAL; PERINEURAL
Status: DISCONTINUED | OUTPATIENT
Start: 2021-11-24 | End: 2021-11-24 | Stop reason: HOSPADM

## 2021-11-24 RX ORDER — PROPOFOL 10 MG/ML
INJECTION, EMULSION INTRAVENOUS PRN
Status: DISCONTINUED | OUTPATIENT
Start: 2021-11-24 | End: 2021-11-24 | Stop reason: SDUPTHER

## 2021-11-24 RX ORDER — SODIUM CHLORIDE, SODIUM LACTATE, POTASSIUM CHLORIDE, CALCIUM CHLORIDE 600; 310; 30; 20 MG/100ML; MG/100ML; MG/100ML; MG/100ML
INJECTION, SOLUTION INTRAVENOUS ONCE
Status: COMPLETED | OUTPATIENT
Start: 2021-11-24 | End: 2021-11-24

## 2021-11-24 RX ORDER — LIDOCAINE HYDROCHLORIDE 20 MG/ML
INJECTION, SOLUTION INFILTRATION; PERINEURAL PRN
Status: DISCONTINUED | OUTPATIENT
Start: 2021-11-24 | End: 2021-11-24 | Stop reason: SDUPTHER

## 2021-11-24 RX ADMIN — PROPOFOL 150 MG: 10 INJECTION, EMULSION INTRAVENOUS at 10:41

## 2021-11-24 RX ADMIN — SODIUM CHLORIDE, SODIUM LACTATE, POTASSIUM CHLORIDE, AND CALCIUM CHLORIDE: .6; .31; .03; .02 INJECTION, SOLUTION INTRAVENOUS at 10:30

## 2021-11-24 RX ADMIN — SODIUM CHLORIDE, POTASSIUM CHLORIDE, SODIUM LACTATE AND CALCIUM CHLORIDE: 600; 310; 30; 20 INJECTION, SOLUTION INTRAVENOUS at 09:56

## 2021-11-24 RX ADMIN — LIDOCAINE HYDROCHLORIDE 100 MG: 20 INJECTION, SOLUTION INFILTRATION; PERINEURAL at 10:41

## 2021-11-24 ASSESSMENT — PAIN - FUNCTIONAL ASSESSMENT: PAIN_FUNCTIONAL_ASSESSMENT: 0-10

## 2021-11-24 ASSESSMENT — PAIN SCALES - GENERAL
PAINLEVEL_OUTOF10: 0

## 2021-11-24 NOTE — ANESTHESIA PRE PROCEDURE
Department of Anesthesiology  Preprocedure Note       Name:  Snow Magaña   Age:  67 y.o.  :  1945                                          MRN:  [id number]         Date:  2021      Surgeon: Erick Solomon):  Vivi Gordon MD    Procedure: Procedure(s):  EGD    Medications prior to admission:   Prior to Admission medications    Medication Sig Start Date End Date Taking? Authorizing Provider   Multiple Vitamin (MULTIVITAMIN PO) Take by mouth daily   Yes Historical Provider, MD   ursodiol (ACTIGALL) 500 MG tablet Take 750 mg by mouth daily   Yes Historical Provider, MD   pantoprazole (PROTONIX) 40 MG tablet Take 1 tablet by mouth 2 times daily (before meals)  Patient taking differently: Take 40 mg by mouth as needed  3/15/21  Yes Jen Edwards MD   levothyroxine (SYNTHROID) 50 MCG tablet TK 1 T PO QD 18  Yes Historical Provider, MD   vitamin B-12 (CYANOCOBALAMIN) 1000 MCG tablet Take 1,000 mcg by mouth daily    Yes Historical Provider, MD   vitamin D (CHOLECALCIFEROL) 1000 UNIT TABS tablet Take 1,000 Units by mouth daily    Yes Historical Provider, MD   guaifenesin-dextromethorphan (ROBITUSSIN DM) 100-10 MG/5ML syrup Take 10 mLs by mouth 3 times daily as needed for Cough. 12  Yes Mona Polk MD   albuterol (PROVENTIL HFA) 108 (90 BASE) MCG/ACT inhaler Inhale 2 puffs into the lungs every 4 hours as needed for Shortness of Breath.  12  Yes Mona Polk MD       Current medications:    Current Facility-Administered Medications   Medication Dose Route Frequency Provider Last Rate Last Admin    lidocaine PF 1 % injection 0.1 mL  0.1 mL IntraDERmal Once PRN Vivi Gordon MD           Allergies:  No Known Allergies    Problem List:    Patient Active Problem List   Diagnosis Code    CAP (community acquired pneumonia) J18.9    PNA (pneumonia) J18.9    History of tobacco abuse Z87.891    Sepsis ([facility] [address] A41.9    Acute upper GI bleed K92.2    Acute GI bleeding K92.2       Past Medical History:        Diagnosis Date    Arthritis     Pneumonia     Thyroid disease     hypothyroidism       Past Surgical History:        Procedure Laterality Date    COLONOSCOPY N/A 3/12/2021    COLONOSCOPY DIAGNOSTIC performed by José Umanzor MD at [facility] ENDOSCOPY N/A 3/12/2021    EGD BAND LIGATION performed by José Umanzor MD at [address] History:    Social History     Tobacco Use    Smoking status: Former Smoker     Packs/day: 0.25     Quit date:      Years since quittin.9    Smokeless tobacco: Never Used   Substance Use Topics    Alcohol use: Not Currently                                Counseling given: Not Answered      Vital Signs (Current):   Vitals:    21 0830 21 0949   BP:  (!) 118/58   Pulse:  84   Resp:  16   Temp:  97.7 °F (36.5 °C)   SpO2:  99%   Weight: 108 lb (49 kg)    Height: 5' 4\" (1.626 m)                                               BP Readings from Last 3 Encounters:   21 (!) 118/58   03/15/21 115/77   21 97/65       NPO Status: Time of last liquid consumption:                         Time of last solid consumption:                         Date of last liquid consumption: 21                        Date of last solid food consumption: 21    BMI:   Wt Readings from Last 3 Encounters:   21 108 lb (49 kg)   21 110 lb (49.9 kg)   21 114 lb 13.8 oz (52.1 kg)     Body mass index is 18.54 kg/m².     CBC:   Lab Results   Component Value Date    WBC 8.5 03/15/2021    RBC 3.56 03/15/2021    HGB 9.7 03/15/2021    HCT 30.0 03/15/2021    MCV 84.3 03/15/2021    RDW 16.9 03/15/2021    PLT 88 03/15/2021       CMP:   Lab Results   Component Value Date     03/15/2021    K 4.7 03/15/2021    K 4.1 2021     03/15/2021    CO2 24 03/15/2021    BUN 16 03/15/2021    CREATININE 0.9 03/15/2021    GFRAA >60 03/15/2021    GFRAA >60 10/28/2010 AGRATIO 0.5 03/30/2019    LABGLOM >60 03/15/2021    GLUCOSE 123 03/15/2021    PROT 5.5 03/15/2021    PROT 7.9 10/28/2010    CALCIUM 8.0 03/15/2021    BILITOT 0.5 03/15/2021    ALKPHOS 88 03/15/2021    AST 38 03/15/2021    ALT 16 03/15/2021       POC Tests: No results for input(s): POCGLU, POCNA, POCK, POCCL, POCBUN, POCHEMO, POCHCT in the last 72 hours. Coags:   Lab Results   Component Value Date    PROTIME 13.2 03/14/2021    INR 1.14 03/14/2021    APTT 25.5 03/12/2021       HCG (If Applicable): No results found for: PREGTESTUR, PREGSERUM, HCG, HCGQUANT     ABGs: No results found for: PHART, PO2ART, DPW3EJQ, XBA1TTA, BEART, Q7RJQTTQ     Type & Screen (If Applicable):  No results found for: LABABO, LABRH    Drug/Infectious Status (If Applicable):  No results found for: HIV, HEPCAB    COVID-19 Screening (If Applicable):   Lab Results   Component Value Date    COVID19 Not Detected 11/22/2021           Anesthesia Evaluation  Patient summary reviewed and Nursing notes reviewed  Airway: Mallampati: II  TM distance: >3 FB   Neck ROM: full  Mouth opening: < 3 FB Dental:    (+) edentulous      Pulmonary:normal exam  breath sounds clear to auscultation  (+) pneumonia: resolved,                             Cardiovascular:Negative CV ROS            Rhythm: regular  Rate: normal                    Neuro/Psych:   Negative Neuro/Psych ROS              GI/Hepatic/Renal: Neg GI/Hepatic/Renal ROS            Endo/Other: Negative Endo/Other ROS                    Abdominal:             Vascular: negative vascular ROS. Other Findings:             Anesthesia Plan      MAC     ASA 2       Induction: intravenous. Anesthetic plan and risks discussed with patient. Plan discussed with CRNA.                   Antonieta Terrell MD   11/24/2021

## 2021-11-24 NOTE — PROCEDURES
Endoscopy Note    Patient: Rach Dunn  : 1945  CSN:     Procedure: Esophagogastroduodenoscopy with banding of varices    Date:  2021     Surgeon:  Thuy Putnam MD     Referring Physician:  Dr. Cosme Miles    Preoperative Diagnosis:  Cirrhosis related to [facility]    Postoperative Diagnosis:  Esophageal varices banded x 4, portal gastropathy    Anesthesia:  Propofol    EBL: <5 mL    Indications: This is a 67y.o. year old female who presents today with a history of esophageal varices, cirrhosis secondary to [facility]    Description of Procedure:  Informed consent was obtained from the patient after explanation of indications, benefits and possible risks and complications of the procedure. The patient was then taken to the endoscopy suite, placed in the left lateral decubitus position and the above IV sedation was administrered. The Olympus videoendoscope was passed through the hypopharynx into the esophagus. The scope was advanced all the way to the duodenum. The mucosa in the duodenal bulb, post bulbar region in the descending duodenum appeared normal. Portal gastropathy was seen in the stomach. The GE junction was at around 35 cms. Today moderate sized varices were seen in the esophagus which did not flatten with complete insufflation, these were banded x 4. The scope was withdrawn and the procedure was terminated. Gastric or Duodenal ulcer present: No      The patient tolerated the procedure well and was taken to the post anesthesia care unit in good condition. Impression: Portal gastropathy, esophageal varices banded      Recommendations: Repeat egd in 4 weeks, office will arrange.     Thuy Putnam MD, MD  [facility]  [phone]

## 2021-11-24 NOTE — H&P
Gastroenterology Note             Pre-operative History and Physical    Patient: Soledad Molina  : 1945  CSN:     History Obtained From:  patient and/or guardian. HISTORY OF PRESENT ILLNESS:    The patient is a 67 y.o. female  here for EGD with mac    Past Medical History:    Past Medical History:   Diagnosis Date    Arthritis     Pneumonia     Thyroid disease     hypothyroidism     Past Surgical History:    Past Surgical History:   Procedure Laterality Date    COLONOSCOPY N/A 3/12/2021    COLONOSCOPY DIAGNOSTIC performed by Mary Oswald MD at [address] [place] [address] Aux Carats N/A 3/12/2021    EGD BAND LIGATION performed by Mary Oswald MD at [facility]OS[facility]     Medications Prior to Admission:   No current facility-administered medications on file prior to encounter. Current Outpatient Medications on File Prior to Encounter   Medication Sig Dispense Refill    Multiple Vitamin (MULTIVITAMIN PO) Take by mouth daily      ursodiol (ACTIGALL) 500 MG tablet Take 750 mg by mouth daily      pantoprazole (PROTONIX) 40 MG tablet Take 1 tablet by mouth 2 times daily (before meals) (Patient taking differently: Take 40 mg by mouth as needed ) 60 tablet 5    levothyroxine (SYNTHROID) 50 MCG tablet TK 1 T PO QD  1    vitamin B-12 (CYANOCOBALAMIN) 1000 MCG tablet Take 1,000 mcg by mouth daily       vitamin D (CHOLECALCIFEROL) 1000 UNIT TABS tablet Take 1,000 Units by mouth daily       guaifenesin-dextromethorphan (ROBITUSSIN DM) 100-10 MG/5ML syrup Take 10 mLs by mouth 3 times daily as needed for Cough. 120 mL 0    albuterol (PROVENTIL HFA) 108 (90 BASE) MCG/ACT inhaler Inhale 2 puffs into the lungs every 4 hours as needed for Shortness of Breath. 1 Inhaler 0        Allergies:  Patient has no known allergies.       Social History:   Social History     Tobacco Use    Smoking status: Former Smoker     Packs/day: 0.25     Quit date: 2000     Years

## 2021-11-24 NOTE — ANESTHESIA POSTPROCEDURE EVALUATION
Department of Anesthesiology  Postprocedure Note    Patient: Cleo Langford  MRN: [id number]  YOB: 1945  Date of evaluation: 11/24/2021  Time:  11:15 AM     Procedure Summary     Date: 11/24/21 Room / Location: [facility]    Anesthesia Start: 1267 Anesthesia Stop: 1691    Procedure: EGD BAND LIGATION (N/A ) Diagnosis:       Secondary esophageal varices without bleeding ([address]      (Secondary esophageal varices without bleeding ([address] [I85.10])    Surgeons: Felicia Moyer MD Responsible Provider: Ashley Krishnamurthy MD    Anesthesia Type: MAC ASA Status: 2          Anesthesia Type: MAC    Edita Phase I: Edita Score: 10    Edita Phase II: Edita Score: 9    Last vitals: Reviewed and per EMR flowsheets.        Anesthesia Post Evaluation    Patient location during evaluation: PACU  Patient participation: complete - patient participated  Level of consciousness: awake and alert  Pain score: 0  Airway patency: patent  Nausea & Vomiting: no nausea and no vomiting  Complications: no  Cardiovascular status: blood pressure returned to baseline  Respiratory status: acceptable  Hydration status: stable

## 2021-12-17 ENCOUNTER — HOSPITAL ENCOUNTER (OUTPATIENT)
Age: 76
Discharge: HOME | End: 2021-12-17
Payer: MEDICARE

## 2021-12-17 PROCEDURE — U0005 INFEC AGEN DETEC AMPLI PROBE: HCPCS

## 2021-12-17 PROCEDURE — U0003 INFECTIOUS AGENT DETECTION BY NUCLEIC ACID (DNA OR RNA); SEVERE ACUTE RESPIRATORY SYNDROME CORONAVIRUS 2 (SARS-COV-2) (CORONAVIRUS DISEASE [COVID-19]), AMPLIFIED PROBE TECHNIQUE, MAKING USE OF HIGH THROUGHPUT TECHNOLOGIES AS DESCRIBED BY CMS-2020-01-R: HCPCS

## 2021-12-17 RX ORDER — DOXYCYCLINE HYCLATE 50 MG/1
50 CAPSULE ORAL 2 TIMES DAILY
COMMUNITY

## 2021-12-17 NOTE — PROGRESS NOTES
Preoperative Screening for Elective Surgery/Invasive Procedures While COVID-19 present in the community     Have you had any of the following symptoms? No  o Fever, chills  o Cough  o Shortness of breath  o Muscle aches/pain  o Diarrhea  o Abdominal pain, nausea, vomiting  o Loss or decrease in taste and / or smell   Risk of Exposure  o Have you recently been hospitalized for COVID-19 or flu-like illness, if so when? No  o Recently diagnosed with COVID-19, if so when? No  o Recently tested for COVID-19, if so when? Nov 2021  o Have you been in close contact with a person or family member who currently has or recently had COVID-23? If yes, when and in what context? No  o Do you live with anybody who in the last 14 days has had fever, chills, shortness of breath, muscle aches, flu-like illness? No  o Do you have any close contacts or family members who are currently in the hospital for COVID-19 or flu-like illness? No  If yes, assess recent close contact with this person. Indicate if the patient has a positive screen by answering yes to one or more of the above questions. Patients who test positive or screen positive prior to surgery or on the day of surgery should be evaluated in conjunction with the surgeon/proceduralist/anesthesiologist to determine the urgency of the procedure.

## 2021-12-17 NOTE — PROGRESS NOTES
Obstructive Sleep Apnea (LINDA) Screening     Patient:  Azra Nunez    YOB: 1945      Medical Record #:  [id number]                     Date:  12/17/2021     1. Are you a loud and/or regular snorer? []  Yes       [x] No    2. Have you been observed to gasp or stop breathing during sleep? []  Yes       [x] No    3. Do you feel tired or groggy upon awakening or do you awaken with a headache?           []  Yes       [] No    4. Are you often tired or fatigued during the wake time hours? []  Yes       [] No    5. Do you fall asleep sitting, reading, watching TV or driving? []  Yes       [] No    6. Do you often have problems with memory or concentration? []  Yes       [] No    **If patient's score is ? 3 they are considered high risk for LINDA. An Anesthesia provider will evaluate the patient and develop a plan of care the day of surgery. Note:  If the patient's BMI is more than 35 kg m¯² , has neck circumference > 40 cm, and/or high blood pressure the risk is greater (© American Sleep Apnea Association, 2006).

## 2021-12-18 LAB — SARS-COV-2: NOT DETECTED

## 2021-12-21 ENCOUNTER — ANESTHESIA (OUTPATIENT)
Dept: ENDOSCOPY | Age: 76
End: 2021-12-21
Payer: MEDICARE

## 2021-12-21 ENCOUNTER — HOSPITAL ENCOUNTER (OUTPATIENT)
Age: 76
Setting detail: OUTPATIENT SURGERY
Discharge: HOME | End: 2021-12-21
Attending: INTERNAL MEDICINE | Admitting: INTERNAL MEDICINE
Payer: MEDICARE

## 2021-12-21 ENCOUNTER — ANESTHESIA EVENT (OUTPATIENT)
Dept: ENDOSCOPY | Age: 76
End: 2021-12-21
Payer: MEDICARE

## 2021-12-21 VITALS
DIASTOLIC BLOOD PRESSURE: 62 MMHG | WEIGHT: 108 LBS | HEART RATE: 76 BPM | SYSTOLIC BLOOD PRESSURE: 105 MMHG | BODY MASS INDEX: 18.44 KG/M2 | HEIGHT: 64 IN | RESPIRATION RATE: 16 BRPM | OXYGEN SATURATION: 94 % | TEMPERATURE: 97.5 F

## 2021-12-21 VITALS — SYSTOLIC BLOOD PRESSURE: 105 MMHG | OXYGEN SATURATION: 100 % | DIASTOLIC BLOOD PRESSURE: 62 MMHG

## 2021-12-21 PROCEDURE — 3700000000 HC ANESTHESIA ATTENDED CARE: Performed by: INTERNAL MEDICINE

## 2021-12-21 PROCEDURE — 2500000003 HC RX 250 WO HCPCS: Performed by: NURSE ANESTHETIST, CERTIFIED REGISTERED

## 2021-12-21 PROCEDURE — 7100000011 HC PHASE II RECOVERY - ADDTL 15 MIN: Performed by: INTERNAL MEDICINE

## 2021-12-21 PROCEDURE — 3700000001 HC ADD 15 MINUTES (ANESTHESIA): Performed by: INTERNAL MEDICINE

## 2021-12-21 PROCEDURE — 2580000003 HC RX 258: Performed by: INTERNAL MEDICINE

## 2021-12-21 PROCEDURE — 7100000010 HC PHASE II RECOVERY - FIRST 15 MIN: Performed by: INTERNAL MEDICINE

## 2021-12-21 PROCEDURE — 3609012300 HC EGD BAND LIGATION ESOPHGEAL/GASTRIC VARICES: Performed by: INTERNAL MEDICINE

## 2021-12-21 PROCEDURE — 3609017100 HC EGD: Performed by: INTERNAL MEDICINE

## 2021-12-21 PROCEDURE — 2709999900 HC NON-CHARGEABLE SUPPLY: Performed by: INTERNAL MEDICINE

## 2021-12-21 PROCEDURE — 6360000002 HC RX W HCPCS: Performed by: NURSE ANESTHETIST, CERTIFIED REGISTERED

## 2021-12-21 RX ORDER — OXYCODONE HYDROCHLORIDE AND ACETAMINOPHEN 5; 325 MG/1; MG/1
1 TABLET ORAL PRN
Status: DISCONTINUED | OUTPATIENT
Start: 2021-12-21 | End: 2021-12-21 | Stop reason: HOSPADM

## 2021-12-21 RX ORDER — LIDOCAINE HYDROCHLORIDE 20 MG/ML
INJECTION, SOLUTION EPIDURAL; INFILTRATION; INTRACAUDAL; PERINEURAL PRN
Status: DISCONTINUED | OUTPATIENT
Start: 2021-12-21 | End: 2021-12-21 | Stop reason: SDUPTHER

## 2021-12-21 RX ORDER — MORPHINE SULFATE 2 MG/ML
1 INJECTION, SOLUTION INTRAMUSCULAR; INTRAVENOUS EVERY 5 MIN PRN
Status: DISCONTINUED | OUTPATIENT
Start: 2021-12-21 | End: 2021-12-21 | Stop reason: HOSPADM

## 2021-12-21 RX ORDER — OXYCODONE HYDROCHLORIDE AND ACETAMINOPHEN 5; 325 MG/1; MG/1
2 TABLET ORAL PRN
Status: DISCONTINUED | OUTPATIENT
Start: 2021-12-21 | End: 2021-12-21 | Stop reason: HOSPADM

## 2021-12-21 RX ORDER — MEPERIDINE HYDROCHLORIDE 50 MG/ML
12.5 INJECTION INTRAMUSCULAR; INTRAVENOUS; SUBCUTANEOUS EVERY 5 MIN PRN
Status: DISCONTINUED | OUTPATIENT
Start: 2021-12-21 | End: 2021-12-21 | Stop reason: HOSPADM

## 2021-12-21 RX ORDER — SODIUM CHLORIDE, SODIUM LACTATE, POTASSIUM CHLORIDE, CALCIUM CHLORIDE 600; 310; 30; 20 MG/100ML; MG/100ML; MG/100ML; MG/100ML
INJECTION, SOLUTION INTRAVENOUS ONCE
Status: COMPLETED | OUTPATIENT
Start: 2021-12-21 | End: 2021-12-21

## 2021-12-21 RX ORDER — LABETALOL HYDROCHLORIDE 5 MG/ML
5 INJECTION, SOLUTION INTRAVENOUS EVERY 10 MIN PRN
Status: DISCONTINUED | OUTPATIENT
Start: 2021-12-21 | End: 2021-12-21 | Stop reason: HOSPADM

## 2021-12-21 RX ORDER — LIDOCAINE HYDROCHLORIDE 10 MG/ML
0.1 INJECTION, SOLUTION EPIDURAL; INFILTRATION; INTRACAUDAL; PERINEURAL
Status: DISCONTINUED | OUTPATIENT
Start: 2021-12-21 | End: 2021-12-21 | Stop reason: HOSPADM

## 2021-12-21 RX ORDER — HYDRALAZINE HYDROCHLORIDE 20 MG/ML
5 INJECTION INTRAMUSCULAR; INTRAVENOUS EVERY 10 MIN PRN
Status: DISCONTINUED | OUTPATIENT
Start: 2021-12-21 | End: 2021-12-21 | Stop reason: HOSPADM

## 2021-12-21 RX ORDER — ONDANSETRON 2 MG/ML
4 INJECTION INTRAMUSCULAR; INTRAVENOUS PRN
Status: DISCONTINUED | OUTPATIENT
Start: 2021-12-21 | End: 2021-12-21 | Stop reason: HOSPADM

## 2021-12-21 RX ORDER — MORPHINE SULFATE 2 MG/ML
2 INJECTION, SOLUTION INTRAMUSCULAR; INTRAVENOUS EVERY 5 MIN PRN
Status: DISCONTINUED | OUTPATIENT
Start: 2021-12-21 | End: 2021-12-21 | Stop reason: HOSPADM

## 2021-12-21 RX ORDER — PROPOFOL 10 MG/ML
INJECTION, EMULSION INTRAVENOUS PRN
Status: DISCONTINUED | OUTPATIENT
Start: 2021-12-21 | End: 2021-12-21 | Stop reason: SDUPTHER

## 2021-12-21 RX ORDER — DIPHENHYDRAMINE HYDROCHLORIDE 50 MG/ML
12.5 INJECTION INTRAMUSCULAR; INTRAVENOUS
Status: DISCONTINUED | OUTPATIENT
Start: 2021-12-21 | End: 2021-12-21 | Stop reason: HOSPADM

## 2021-12-21 RX ORDER — PROMETHAZINE HYDROCHLORIDE 25 MG/ML
6.25 INJECTION, SOLUTION INTRAMUSCULAR; INTRAVENOUS
Status: DISCONTINUED | OUTPATIENT
Start: 2021-12-21 | End: 2021-12-21 | Stop reason: HOSPADM

## 2021-12-21 RX ADMIN — PROPOFOL 20 MG: 10 INJECTION, EMULSION INTRAVENOUS at 10:04

## 2021-12-21 RX ADMIN — LIDOCAINE HYDROCHLORIDE 60 MG: 20 INJECTION, SOLUTION EPIDURAL; INFILTRATION; INTRACAUDAL; PERINEURAL at 10:02

## 2021-12-21 RX ADMIN — SODIUM CHLORIDE, SODIUM LACTATE, POTASSIUM CHLORIDE, AND CALCIUM CHLORIDE: .6; .31; .03; .02 INJECTION, SOLUTION INTRAVENOUS at 09:58

## 2021-12-21 RX ADMIN — PROPOFOL 30 MG: 10 INJECTION, EMULSION INTRAVENOUS at 10:07

## 2021-12-21 RX ADMIN — PROPOFOL 30 MG: 10 INJECTION, EMULSION INTRAVENOUS at 10:12

## 2021-12-21 RX ADMIN — PROPOFOL 20 MG: 10 INJECTION, EMULSION INTRAVENOUS at 10:06

## 2021-12-21 RX ADMIN — PROPOFOL 20 MG: 10 INJECTION, EMULSION INTRAVENOUS at 10:09

## 2021-12-21 RX ADMIN — PROPOFOL 80 MG: 10 INJECTION, EMULSION INTRAVENOUS at 10:02

## 2021-12-21 ASSESSMENT — PAIN SCALES - GENERAL
PAINLEVEL_OUTOF10: 0

## 2021-12-21 NOTE — PROCEDURES
Endoscopy Note    Patient: Cheryl Granados  : 1945  CSN:     Procedure: Esophagogastroduodenoscopy with variceal band ligation    Date:  2021     Surgeon:  Rimma Dallas MD     Referring Physician:  Dr. Coby Goodpasture    Preoperative Diagnosis:  NARAYANAN cirrhosis    Postoperative Diagnosis:  Esophageal varices banded    Anesthesia:  propofol    EBL: <5 mL    Indications: This is a 67y.o. year old female who presents today with a history of esophageal varices    Description of Procedure:  Informed consent was obtained from the patient after explanation of indications, benefits and possible risks and complications of the procedure. The patient was then taken to the endoscopy suite, placed in the left lateral decubitus position and the above IV sedation was administrered.     The Olympus videoendoscope was passed through the hypopharynx into the esophagus. The scope was advanced all the way to the duodenum. The mucosa in the duodenal bulb, post bulbar region in the descending duodenum appeared normal. Portal gastropathy was seen in the stomach. The GE junction was at around 35 cms. Today moderate sized varices were seen in the esophagus which did not flatten with complete insufflation, these were banded x 2. The scope was withdrawn and the procedure was terminated.       Gastric or Duodenal ulcer present: No      The patient tolerated the procedure well and was taken to the post anesthesia care unit in good condition. Impression:  Esophageal varices banded x2      Recommendations: Repeat procedure in 3-4 weeks to ensure eradication of varices.     Rimma Dallas MD, MD  [facility]  [phone]

## 2021-12-21 NOTE — ANESTHESIA PRE PROCEDURE
Department of Anesthesiology  Preprocedure Note       Name:  Yue Molina   Age:  67 y.o.  :  1945                                          MRN:  [id number]         Date:  2021      Surgeon: Mohit Oglesby):  Garrick Hernandez MD    Procedure: Procedure(s):  EGD    Medications prior to admission:   Prior to Admission medications    Medication Sig Start Date End Date Taking? Authorizing Provider   doxycycline (VIBRAMYCIN) 50 MG capsule Take 50 mg by mouth 2 times daily   Yes Historical Provider, MD   Multiple Vitamin (MULTIVITAMIN PO) Take by mouth daily   Yes Historical Provider, MD   ursodiol (ACTIGALL) 500 MG tablet Take 750 mg by mouth daily   Yes Historical Provider, MD   pantoprazole (PROTONIX) 40 MG tablet Take 1 tablet by mouth 2 times daily (before meals)  Patient taking differently: Take 40 mg by mouth as needed  3/15/21  Yes Claudia Cordon MD   levothyroxine (SYNTHROID) 50 MCG tablet TK 1 T PO QD 18  Yes Historical Provider, MD   vitamin B-12 (CYANOCOBALAMIN) 1000 MCG tablet Take 1,000 mcg by mouth daily    Yes Historical Provider, MD   vitamin D (CHOLECALCIFEROL) 1000 UNIT TABS tablet Take 1,000 Units by mouth daily    Yes Historical Provider, MD   guaifenesin-dextromethorphan (ROBITUSSIN DM) 100-10 MG/5ML syrup Take 10 mLs by mouth 3 times daily as needed for Cough. 12  Yes Angelica Griffin MD   albuterol (PROVENTIL HFA) 108 (90 BASE) MCG/ACT inhaler Inhale 2 puffs into the lungs every 4 hours as needed for Shortness of Breath.  12  Yes Angelica Griffin MD       Current medications:    Current Facility-Administered Medications   Medication Dose Route Frequency Provider Last Rate Last Admin    lactated ringers infusion   IntraVENous Once Garrick Hernandez MD        lidocaine PF 1 % injection 0.1 mL  0.1 mL IntraDERmal Once PRN Garrick Hernandez MD           Allergies:  No Known Allergies    Problem List:    Patient Active Problem List   Diagnosis Code    CAP (community acquired pneumonia) J18.9    PNA (pneumonia) J18.9    History of tobacco abuse Z87.891    Sepsis ([address] A41.9    Acute upper GI bleed K92.2    Acute GI bleeding K92.2       Past Medical History:        Diagnosis Date    Arthritis     Pneumonia     Thyroid disease     hypothyroidism       Past Surgical History:        Procedure Laterality Date    COLONOSCOPY N/A 3/12/2021    COLONOSCOPY DIAGNOSTIC performed by Edelmira Zamarripa MD at [address] N/A 3/12/2021    EGD BAND LIGATION performed by Edelmira Zamarripa MD at [address] [facility] N/A 2021    EGD BAND LIGATION performed by Kalia Gongora MD at [address] [facility] History:    Social History     Tobacco Use    Smoking status: Former Smoker     Packs/day: 0.25     Quit date:      Years since quittin.9    Smokeless tobacco: Never Used   Substance Use Topics    Alcohol use: Not Currently                                Counseling given: Not Answered      Vital Signs (Current):   Vitals:    21 1240 21 0838   BP:  122/69   Pulse:  88   Resp:  16   Temp:  97.5 °F (36.4 °C)   SpO2:  93%   Weight: 108 lb (49 kg)    Height: 5' 4\" (1.626 m)                                               BP Readings from Last 3 Encounters:   21 122/69   21 132/64   21 111/63       NPO Status: Time of last liquid consumption:                         Time of last solid consumption:                         Date of last liquid consumption: 21                        Date of last solid food consumption: 21    BMI:   Wt Readings from Last 3 Encounters:   21 108 lb (49 kg)   21 108 lb (49 kg)   21 110 lb (49.9 kg)     Body mass index is 18.54 kg/m².     CBC:   Lab Results   Component Value Date    WBC 8.5 03/15/2021    RBC 3.56 03/15/2021    HGB 9.7 03/15/2021    HCT 30.0 03/15/2021    MCV 84.3 03/15/2021    RDW Pre-Operative Diagnosis: Secondary esophageal varices without bleeding (HCC) [I85.10]    67 y.o.   BMI:  Body mass index is 18.54 kg/m². Vitals:    21 1240 21 0838   BP:  122/69   Pulse:  88   Resp:  16   Temp:  97.5 °F (36.4 °C)   SpO2:  93%   Weight: 108 lb (49 kg)    Height: 5' 4\" (1.626 m)        No Known Allergies    Social History     Tobacco Use    Smoking status: Former Smoker     Packs/day: 0.25     Quit date:      Years since quittin.    Smokeless tobacco: Never Used   Substance Use Topics    Alcohol use: Not Currently       LABS:    CBC  Lab Results   Component Value Date/Time    WBC 8.5 03/15/2021 04:34 AM    HGB 9.7 (L) 03/15/2021 04:34 AM    HCT 30.0 (L) 03/15/2021 04:34 AM    PLT 88 (L) 03/15/2021 04:34 AM     RENAL  Lab Results   Component Value Date/Time     (L) 03/15/2021 04:33 AM    K 4.7 03/15/2021 04:33 AM    K 4.1 2021 11:19 PM     03/15/2021 04:33 AM    CO2 24 03/15/2021 04:33 AM    BUN 16 03/15/2021 04:33 AM    CREATININE 0.9 03/15/2021 04:33 AM    GLUCOSE 123 (H) 03/15/2021 04:33 AM     COAGS  Lab Results   Component Value Date/Time    PROTIME 13.2 2021 08:15 AM    INR 1.14 2021 08:15 AM    APTT 25.5 2021 10:10 AM          Anesthesia Plan      MAC     ASA 2     (I discussed with the patient the risks and benefits of PIV, anesthesia, IV Narcotics, PACU. All questions were answered the patient agrees with the plan and wishes to proceed)  Induction: intravenous.                           Nisha Flores MD   2021

## 2021-12-21 NOTE — ANESTHESIA POSTPROCEDURE EVALUATION
Department of Anesthesiology  Postprocedure Note    Patient: Oanh Foote  MRN: [id number]  YOB: 1945  Date of evaluation: 12/21/2021  Time:  11:40 AM     Procedure Summary     Date: 12/21/21 Room / Location: [facility]    Anesthesia Start: 4952 Anesthesia Stop: 1363    Procedures:       EGD (N/A )      EGD BAND LIGATION Diagnosis:       Secondary esophageal varices without bleeding ([address]      (Secondary esophageal varices without bleeding ([address] [I85.10])    Surgeons: Alberto Vitale MD Responsible Provider: Sheeba Verma MD    Anesthesia Type: MAC ASA Status: 2          Anesthesia Type: MAC    Edita Phase I:      Edita Phase II: Edita Score: 10    Last vitals: Reviewed and per EMR flowsheets.        Anesthesia Post Evaluation    Comments: Postoperative Anesthesia Note    Name:    Oanh Foote  MRN:      [id number]    Patient Vitals in the past 12 hrs:  12/21/21 1044, BP:105/62, Pulse:76, Resp:16, SpO2:94 %  12/21/21 1030, BP:106/64, Pulse:80, Resp:16, SpO2:94 %  12/21/21 1015, BP:96/61, Pulse:80, Resp:18, SpO2:94 %  12/21/21 0838, BP:122/69, Temp:97.5 °F (36.4 °C), Pulse:88, Resp:16, SpO2:93 %     LABS:    CBC  Lab Results       Component                Value               Date/Time                  WBC                      8.5                 03/15/2021 04:34 AM        HGB                      9.7 (L)             03/15/2021 04:34 AM        HCT                      30.0 (L)            03/15/2021 04:34 AM        PLT                      88 (L)              03/15/2021 04:34 AM   RENAL  Lab Results       Component                Value               Date/Time                  NA                       134 (L)             03/15/2021 04:33 AM        K                        4.7                 03/15/2021 04:33 AM        K                        4.1                 03/11/2021 11:19 PM        CL                       105                 03/15/2021 04:33 AM        CO2 24                  03/15/2021 04:33 AM        BUN                      16                  03/15/2021 04:33 AM        CREATININE               0.9                 03/15/2021 04:33 AM        GLUCOSE                  123 (H)             03/15/2021 04:33 AM   ROSSY  Lab Results       Component                Value               Date/Time                  PROTIME                  13.2                03/14/2021 08:15 AM        INR                      1.14                03/14/2021 08:15 AM        APTT                     25.5                03/12/2021 10:10 AM     Intake & Output:  @32HYZK@    Nausea & Vomiting:  No    Level of Consciousness:  Awake    Pain Assessment:  Adequate analgesia    Anesthesia Complications:  No apparent anesthetic complications    SUMMARY      Vital signs stable  OK to discharge from Stage I post anesthesia care.   Care transferred from Anesthesiology department on discharge from perioperative area

## 2021-12-21 NOTE — H&P
Gastroenterology Note             Pre-operative History and Physical    Patient: Leslie Jones  : 1945  CSN:     History Obtained From:  patient and/or guardian. HISTORY OF PRESENT ILLNESS:    The patient is a 67 y.o. female  here for EGD    Past Medical History:    Past Medical History:   Diagnosis Date    Arthritis     Pneumonia     Thyroid disease     hypothyroidism     Past Surgical History:    Past Surgical History:   Procedure Laterality Date    COLONOSCOPY N/A 3/12/2021    COLONOSCOPY DIAGNOSTIC performed by James Antony MD at [address] N/A 3/12/2021    EGD BAND LIGATION performed by James Antony MD at [address] [facility] 2021    EGD BAND LIGATION performed by Amparo Rae MD at [address] [place] [address]     Medications Prior to Admission:   No current facility-administered medications on file prior to encounter. Current Outpatient Medications on File Prior to Encounter   Medication Sig Dispense Refill    doxycycline (VIBRAMYCIN) 50 MG capsule Take 50 mg by mouth 2 times daily      Multiple Vitamin (MULTIVITAMIN PO) Take by mouth daily      ursodiol (ACTIGALL) 500 MG tablet Take 750 mg by mouth daily      pantoprazole (PROTONIX) 40 MG tablet Take 1 tablet by mouth 2 times daily (before meals) (Patient taking differently: Take 40 mg by mouth as needed ) 60 tablet 5    levothyroxine (SYNTHROID) 50 MCG tablet TK 1 T PO QD  1    vitamin B-12 (CYANOCOBALAMIN) 1000 MCG tablet Take 1,000 mcg by mouth daily       vitamin D (CHOLECALCIFEROL) 1000 UNIT TABS tablet Take 1,000 Units by mouth daily       guaifenesin-dextromethorphan (ROBITUSSIN DM) 100-10 MG/5ML syrup Take 10 mLs by mouth 3 times daily as needed for Cough. 120 mL 0    albuterol (PROVENTIL HFA) 108 (90 BASE) MCG/ACT inhaler Inhale 2 puffs into the lungs every 4 hours as needed for Shortness of Breath.  1 Inhaler 0        Allergies:  Patient has no known allergies. Social History:   Social History     Tobacco Use    Smoking status: Former Smoker     Packs/day: 0.25     Quit date:      Years since quittin.9    Smokeless tobacco: Never Used   Substance Use Topics    Alcohol use: Not Currently     Family History:   Family History   Problem Relation Age of Onset    Alzheimer's Disease Mother     Cancer Father        PHYSICAL EXAM:      /69   Pulse 88   Temp 97.5 °F (36.4 °C)   Resp 16   Ht 5' 4\" (1.626 m)   Wt 108 lb (49 kg)   SpO2 93%   BMI 18.54 kg/m²  I        Heart:   RRR, normal s1s2    Lungs:  CTA bilat,  Normal effort    Abdomen:   NT, ND      ASA Grade:  ASA 2 - Patient with mild systemic disease with no functional limitations    Mallampati Class: 2          ASSESSMENT AND PLAN:    1. Patient is a 67 y.o. female here for EGD with MAC.   2.  Procedure options, risks and benefits reviewed with patient. Patient expresses understanding.     Irving John MD,   [facility]  2021

## 2022-01-14 ENCOUNTER — HOSPITAL ENCOUNTER (OUTPATIENT)
Age: 77
Discharge: HOME | End: 2022-01-14
Payer: MEDICARE

## 2022-01-14 PROCEDURE — U0003 INFECTIOUS AGENT DETECTION BY NUCLEIC ACID (DNA OR RNA); SEVERE ACUTE RESPIRATORY SYNDROME CORONAVIRUS 2 (SARS-COV-2) (CORONAVIRUS DISEASE [COVID-19]), AMPLIFIED PROBE TECHNIQUE, MAKING USE OF HIGH THROUGHPUT TECHNOLOGIES AS DESCRIBED BY CMS-2020-01-R: HCPCS

## 2022-01-14 PROCEDURE — U0005 INFEC AGEN DETEC AMPLI PROBE: HCPCS

## 2022-01-15 LAB — SARS-COV-2: NOT DETECTED

## 2022-01-18 ENCOUNTER — HOSPITAL ENCOUNTER (OUTPATIENT)
Age: 77
Setting detail: OUTPATIENT SURGERY
Discharge: HOME | End: 2022-01-18
Attending: INTERNAL MEDICINE | Admitting: INTERNAL MEDICINE
Payer: MEDICARE

## 2022-01-18 ENCOUNTER — ANESTHESIA (OUTPATIENT)
Dept: ENDOSCOPY | Age: 77
End: 2022-01-18
Payer: MEDICARE

## 2022-01-18 ENCOUNTER — ANESTHESIA EVENT (OUTPATIENT)
Dept: ENDOSCOPY | Age: 77
End: 2022-01-18
Payer: MEDICARE

## 2022-01-18 VITALS
RESPIRATION RATE: 22 BRPM | SYSTOLIC BLOOD PRESSURE: 74 MMHG | DIASTOLIC BLOOD PRESSURE: 47 MMHG | OXYGEN SATURATION: 99 %

## 2022-01-18 VITALS
HEART RATE: 70 BPM | DIASTOLIC BLOOD PRESSURE: 60 MMHG | RESPIRATION RATE: 20 BRPM | TEMPERATURE: 98.9 F | OXYGEN SATURATION: 95 % | SYSTOLIC BLOOD PRESSURE: 110 MMHG

## 2022-01-18 PROCEDURE — 7100000010 HC PHASE II RECOVERY - FIRST 15 MIN: Performed by: INTERNAL MEDICINE

## 2022-01-18 PROCEDURE — 3609012900 HC EGD FOREIGN BODY REMOVAL: Performed by: INTERNAL MEDICINE

## 2022-01-18 PROCEDURE — 7100000011 HC PHASE II RECOVERY - ADDTL 15 MIN: Performed by: INTERNAL MEDICINE

## 2022-01-18 PROCEDURE — 2709999900 HC NON-CHARGEABLE SUPPLY: Performed by: INTERNAL MEDICINE

## 2022-01-18 PROCEDURE — 3700000001 HC ADD 15 MINUTES (ANESTHESIA): Performed by: INTERNAL MEDICINE

## 2022-01-18 PROCEDURE — 3700000000 HC ANESTHESIA ATTENDED CARE: Performed by: INTERNAL MEDICINE

## 2022-01-18 PROCEDURE — 3609017100 HC EGD: Performed by: INTERNAL MEDICINE

## 2022-01-18 PROCEDURE — 6360000002 HC RX W HCPCS: Performed by: NURSE ANESTHETIST, CERTIFIED REGISTERED

## 2022-01-18 PROCEDURE — 2580000003 HC RX 258: Performed by: NURSE ANESTHETIST, CERTIFIED REGISTERED

## 2022-01-18 RX ORDER — HYDRALAZINE HYDROCHLORIDE 20 MG/ML
5 INJECTION INTRAMUSCULAR; INTRAVENOUS EVERY 10 MIN PRN
Status: DISCONTINUED | OUTPATIENT
Start: 2022-01-18 | End: 2022-01-18 | Stop reason: HOSPADM

## 2022-01-18 RX ORDER — MORPHINE SULFATE 2 MG/ML
2 INJECTION, SOLUTION INTRAMUSCULAR; INTRAVENOUS EVERY 5 MIN PRN
Status: DISCONTINUED | OUTPATIENT
Start: 2022-01-18 | End: 2022-01-18 | Stop reason: HOSPADM

## 2022-01-18 RX ORDER — SODIUM CHLORIDE 0.9 % (FLUSH) 0.9 %
5-40 SYRINGE (ML) INJECTION PRN
Status: CANCELLED | OUTPATIENT
Start: 2022-01-18

## 2022-01-18 RX ORDER — MORPHINE SULFATE 2 MG/ML
1 INJECTION, SOLUTION INTRAMUSCULAR; INTRAVENOUS EVERY 5 MIN PRN
Status: DISCONTINUED | OUTPATIENT
Start: 2022-01-18 | End: 2022-01-18 | Stop reason: HOSPADM

## 2022-01-18 RX ORDER — SODIUM CHLORIDE, SODIUM LACTATE, POTASSIUM CHLORIDE, CALCIUM CHLORIDE 600; 310; 30; 20 MG/100ML; MG/100ML; MG/100ML; MG/100ML
INJECTION, SOLUTION INTRAVENOUS CONTINUOUS PRN
Status: DISCONTINUED | OUTPATIENT
Start: 2022-01-18 | End: 2022-01-18 | Stop reason: SDUPTHER

## 2022-01-18 RX ORDER — SODIUM CHLORIDE 0.9 % (FLUSH) 0.9 %
5-40 SYRINGE (ML) INJECTION EVERY 12 HOURS SCHEDULED
Status: CANCELLED | OUTPATIENT
Start: 2022-01-18

## 2022-01-18 RX ORDER — LABETALOL HYDROCHLORIDE 5 MG/ML
5 INJECTION, SOLUTION INTRAVENOUS EVERY 10 MIN PRN
Status: DISCONTINUED | OUTPATIENT
Start: 2022-01-18 | End: 2022-01-18 | Stop reason: HOSPADM

## 2022-01-18 RX ORDER — PROMETHAZINE HYDROCHLORIDE 25 MG/ML
6.25 INJECTION, SOLUTION INTRAMUSCULAR; INTRAVENOUS
Status: DISCONTINUED | OUTPATIENT
Start: 2022-01-18 | End: 2022-01-18 | Stop reason: HOSPADM

## 2022-01-18 RX ORDER — LIDOCAINE HYDROCHLORIDE 10 MG/ML
0.3 INJECTION, SOLUTION EPIDURAL; INFILTRATION; INTRACAUDAL; PERINEURAL
Status: CANCELLED | OUTPATIENT
Start: 2022-01-18 | End: 2022-01-18

## 2022-01-18 RX ORDER — PROPOFOL 10 MG/ML
INJECTION, EMULSION INTRAVENOUS PRN
Status: DISCONTINUED | OUTPATIENT
Start: 2022-01-18 | End: 2022-01-18 | Stop reason: SDUPTHER

## 2022-01-18 RX ORDER — SODIUM CHLORIDE 9 MG/ML
25 INJECTION, SOLUTION INTRAVENOUS PRN
Status: CANCELLED | OUTPATIENT
Start: 2022-01-18

## 2022-01-18 RX ORDER — OXYCODONE HYDROCHLORIDE AND ACETAMINOPHEN 5; 325 MG/1; MG/1
1 TABLET ORAL PRN
Status: DISCONTINUED | OUTPATIENT
Start: 2022-01-18 | End: 2022-01-18 | Stop reason: HOSPADM

## 2022-01-18 RX ORDER — SODIUM CHLORIDE, SODIUM LACTATE, POTASSIUM CHLORIDE, CALCIUM CHLORIDE 600; 310; 30; 20 MG/100ML; MG/100ML; MG/100ML; MG/100ML
INJECTION, SOLUTION INTRAVENOUS CONTINUOUS
Status: CANCELLED | OUTPATIENT
Start: 2022-01-18

## 2022-01-18 RX ORDER — DIPHENHYDRAMINE HYDROCHLORIDE 50 MG/ML
12.5 INJECTION INTRAMUSCULAR; INTRAVENOUS
Status: DISCONTINUED | OUTPATIENT
Start: 2022-01-18 | End: 2022-01-18 | Stop reason: HOSPADM

## 2022-01-18 RX ORDER — MEPERIDINE HYDROCHLORIDE 50 MG/ML
12.5 INJECTION INTRAMUSCULAR; INTRAVENOUS; SUBCUTANEOUS EVERY 5 MIN PRN
Status: DISCONTINUED | OUTPATIENT
Start: 2022-01-18 | End: 2022-01-18 | Stop reason: HOSPADM

## 2022-01-18 RX ORDER — ONDANSETRON 2 MG/ML
4 INJECTION INTRAMUSCULAR; INTRAVENOUS PRN
Status: DISCONTINUED | OUTPATIENT
Start: 2022-01-18 | End: 2022-01-18 | Stop reason: HOSPADM

## 2022-01-18 RX ORDER — OXYCODONE HYDROCHLORIDE AND ACETAMINOPHEN 5; 325 MG/1; MG/1
2 TABLET ORAL PRN
Status: DISCONTINUED | OUTPATIENT
Start: 2022-01-18 | End: 2022-01-18 | Stop reason: HOSPADM

## 2022-01-18 RX ADMIN — PROPOFOL 200 MG: 10 INJECTION, EMULSION INTRAVENOUS at 09:13

## 2022-01-18 RX ADMIN — SODIUM CHLORIDE, SODIUM LACTATE, POTASSIUM CHLORIDE, AND CALCIUM CHLORIDE: .6; .31; .03; .02 INJECTION, SOLUTION INTRAVENOUS at 09:06

## 2022-01-18 NOTE — PROGRESS NOTES
Discharge instructions explained to pt and pt family. All questions answered. Copy given to pt family.

## 2022-01-18 NOTE — PROGRESS NOTES
Pt is dressed, IV removed. Tolerating water. Pt walked to stand by assist to vehicle.  Family to drive home

## 2022-01-18 NOTE — H&P
Gastroenterology Note             Pre-operative History and Physical    Patient: Thony Monroy  : 1945  CSN:     History Obtained From:  patient and/or guardian. HISTORY OF PRESENT ILLNESS:    The patient is a 67 y.o. female  here for EGD    Past Medical History:    Past Medical History:   Diagnosis Date    Arthritis     Pneumonia     Thyroid disease     hypothyroidism     Past Surgical History:    Past Surgical History:   Procedure Laterality Date    COLONOSCOPY N/A 3/12/2021    COLONOSCOPY DIAGNOSTIC performed by Francisco Leonard MD at [address] N/A 3/12/2021    EGD BAND LIGATION performed by Francisco Leonard MD at [address] [place] [address] 2021    EGD BAND LIGATION performed by Rosa Champagne MD at [place] [address] 2021    EGD performed by Rosa Champagne MD at [place] [address]  2021    EGD BAND LIGATION performed by Rosa Champagne MD at [address] [place] [address]     Medications Prior to Admission:   No current facility-administered medications on file prior to encounter.      Current Outpatient Medications on File Prior to Encounter   Medication Sig Dispense Refill    doxycycline (VIBRAMYCIN) 50 MG capsule Take 50 mg by mouth 2 times daily      Multiple Vitamin (MULTIVITAMIN PO) Take by mouth daily      ursodiol (ACTIGALL) 500 MG tablet Take 750 mg by mouth daily      pantoprazole (PROTONIX) 40 MG tablet Take 1 tablet by mouth 2 times daily (before meals) (Patient taking differently: Take 40 mg by mouth as needed ) 60 tablet 5    levothyroxine (SYNTHROID) 50 MCG tablet TK 1 T PO QD  1    vitamin B-12 (CYANOCOBALAMIN) 1000 MCG tablet Take 1,000 mcg by mouth daily       vitamin D (CHOLECALCIFEROL) 1000 UNIT TABS tablet Take 1,000 Units by mouth daily       guaifenesin-dextromethorphan (ROBITUSSIN DM) 100-10 MG/5ML syrup Take 10 mLs by mouth 3 times daily as needed for Cough. 120 mL 0    albuterol (PROVENTIL HFA) 108 (90 BASE) MCG/ACT inhaler Inhale 2 puffs into the lungs every 4 hours as needed for Shortness of Breath. 1 Inhaler 0        Allergies:  Patient has no known allergies. Social History:   Social History     Tobacco Use    Smoking status: Former Smoker     Packs/day: 0.25     Quit date:      Years since quittin.0    Smokeless tobacco: Never Used   Substance Use Topics    Alcohol use: Not Currently     Family History:   Family History   Problem Relation Age of Onset    Alzheimer's Disease Mother     Cancer Father        PHYSICAL EXAM:      /70   Pulse 92   Temp 98.9 °F (37.2 °C)   Resp 16   SpO2 98%  I        Heart:   RRR, normal s1s2    Lungs:  CTA bilat,  Normal effort    Abdomen:   NT, ND      ASA Grade:  ASA 3 - Patient with moderate systemic disease with functional limitations    Mallampati Class: 2          ASSESSMENT AND PLAN:    1. Patient is a 67 y.o. female here for EGD with MAC.   2.  Procedure options, risks and benefits reviewed with patient. Patient expresses understanding.     Kirsten Sosa MD,   [address]  2022

## 2022-01-18 NOTE — ANESTHESIA PRE PROCEDURE
Department of Anesthesiology  Preprocedure Note       Name:  Danie Ching   Age:  67 y.o.  :  1945                                          MRN:  [id number]         Date:  2022      Surgeon: Patricia Pete):  Edison Burrows MD    Procedure: Procedure(s):  EGD DIAGNOSTIC ONLY    Medications prior to admission:   Prior to Admission medications    Medication Sig Start Date End Date Taking? Authorizing Provider   doxycycline (VIBRAMYCIN) 50 MG capsule Take 50 mg by mouth 2 times daily   Yes Historical Provider, MD   Multiple Vitamin (MULTIVITAMIN PO) Take by mouth daily   Yes Historical Provider, MD   ursodiol (ACTIGALL) 500 MG tablet Take 750 mg by mouth daily   Yes Historical Provider, MD   pantoprazole (PROTONIX) 40 MG tablet Take 1 tablet by mouth 2 times daily (before meals)  Patient taking differently: Take 40 mg by mouth as needed  3/15/21  Yes Ramo Ann MD   levothyroxine (SYNTHROID) 50 MCG tablet TK 1 T PO QD 18  Yes Historical Provider, MD   vitamin B-12 (CYANOCOBALAMIN) 1000 MCG tablet Take 1,000 mcg by mouth daily    Yes Historical Provider, MD   vitamin D (CHOLECALCIFEROL) 1000 UNIT TABS tablet Take 1,000 Units by mouth daily    Yes Historical Provider, MD   guaifenesin-dextromethorphan (ROBITUSSIN DM) 100-10 MG/5ML syrup Take 10 mLs by mouth 3 times daily as needed for Cough. 12  Yes Clare Parish MD   albuterol (PROVENTIL HFA) 108 (90 BASE) MCG/ACT inhaler Inhale 2 puffs into the lungs every 4 hours as needed for Shortness of Breath. 12  Yes lCare Parish MD       Current medications:    No current facility-administered medications for this encounter.        Allergies:  No Known Allergies    Problem List:    Patient Active Problem List   Diagnosis Code    CAP (community acquired pneumonia) J18.9    PNA (pneumonia) J18.9    History of tobacco abuse Z87.891    Sepsis ([facility] [address] A41.9    Acute upper GI bleed K92.2    Acute GI bleeding K92.2       Past Medical History: Diagnosis Date    Arthritis     Pneumonia     Thyroid disease     hypothyroidism       Past Surgical History:        Procedure Laterality Date    COLONOSCOPY N/A 3/12/2021    COLONOSCOPY DIAGNOSTIC performed by Sandra Yoon MD at [address] N/A 3/12/2021    [facility]GD [facility]    EGD BAND LIGATION performed by Patricia Clements MD at [address] 2021    EGD performed by Patricia Clements MD at [address]  2021    EGD BAND LIGATION performed by Patricia Clements MD at [address] History:    Social History     Tobacco Use    Smoking status: Former Smoker     Packs/day: 0.25     Quit date:      Years since quittin.0    Smokeless tobacco: Never Used   Substance Use Topics    Alcohol use: Not Currently                                Counseling given: Not Answered      Vital Signs (Current):   Vitals:    22 0846   BP: 128/70   Pulse: 92   Resp: 16   Temp: 98.9 °F (37.2 °C)   SpO2: 98%                                              BP Readings from Last 3 Encounters:   22 128/70   21 105/62   21 105/62       NPO Status: Time of last liquid consumption:                         Time of last solid consumption:                         Date of last liquid consumption: 22                        Date of last solid food consumption: 22    BMI:   Wt Readings from Last 3 Encounters:   21 108 lb (49 kg)   21 108 lb (49 kg)   21 110 lb (49.9 kg)     There is no height or weight on file to calculate BMI.    CBC:   Lab Results   Component Value Date    WBC 8.5 03/15/2021    RBC 3.56 03/15/2021    HGB 9.7 03/15/2021    HCT 30.0 03/15/2021    MCV 84.3 03/15/2021    RDW 16.9 Dysphagia, pharyngoesophageal [R13.14]    67 y.o.   BMI:  There is no height or weight on file to calculate BMI. Vitals:    22 0846   BP: 128/70   Pulse: 92   Resp: 16   Temp: 98.9 °F (37.2 °C)   SpO2: 98%       No Known Allergies    Social History     Tobacco Use    Smoking status: Former Smoker     Packs/day: 0.25     Quit date:      Years since quittin.0    Smokeless tobacco: Never Used   Substance Use Topics    Alcohol use: Not Currently       LABS:    CBC  Lab Results   Component Value Date/Time    WBC 8.5 03/15/2021 04:34 AM    HGB 9.7 (L) 03/15/2021 04:34 AM    HCT 30.0 (L) 03/15/2021 04:34 AM    PLT 88 (L) 03/15/2021 04:34 AM     RENAL  Lab Results   Component Value Date/Time     (L) 03/15/2021 04:33 AM    K 4.7 03/15/2021 04:33 AM    K 4.1 2021 11:19 PM     03/15/2021 04:33 AM    CO2 24 03/15/2021 04:33 AM    BUN 16 03/15/2021 04:33 AM    CREATININE 0.9 03/15/2021 04:33 AM    GLUCOSE 123 (H) 03/15/2021 04:33 AM     COAGS  Lab Results   Component Value Date/Time    PROTIME 13.2 2021 08:15 AM    INR 1.14 2021 08:15 AM    APTT 25.5 2021 10:10 AM          Anesthesia Plan      general     ASA 3     (I discussed with the patient the risks and benefits of PIV, anesthesia, IV Narcotics, PACU. All questions were answered the patient agrees with the plan and wishes to proceed)  Induction: intravenous.                           Attila Quick MD   2022

## 2022-01-18 NOTE — PROCEDURES
Endoscopy Note    Patient: Cara Spear  : 1945  CSN:     Procedure: Esophagogastroduodenoscopy with foreign body removal    Date:  2022     Surgeon:  Renee Negrete MD     Referring Physician:  Dr. Luli Perez    Preoperative Diagnosis:  Surveillance for esophageal varices with recent banding    Postoperative Diagnosis:  Foreign body esophagus    Anesthesia:  Propofol    EBL: <5 mL    Indications: This is a 67y.o. year old female who presents today with for surveillance of esophageal varices but ended up being Removal of foreign body. Description of Procedure:  Informed consent was obtained from the patient after explanation of indications, benefits and possible risks and complications of the procedure. The patient was then taken to the endoscopy suite, placed in the left lateral decubitus position and the above IV sedation was administrered. The Olympus videoendoscope was passed through the hypopharynx into the esophagus. Here we encountered a foreign body with green beans and meat impacting the lumen of the esophagus, this was carefully removed with parker net and alligator forceps. Eventually we could see the stomach lumen. Given the amount of food content we terminated the procedure at this point. The scope was withdrawn and the procedure was terminated. The patient tolerated the procedure well and was taken to the post anesthesia care unit in good condition. Impression:  Foreign body esophagus      Recommendations: -At this point no large esophageal varices were seen however there appeared to be esophageal stricturing from previous banding, likely accounting for the foreign body impaction. We suggest the patient consume soft foods, crush pills and remain up right after meals for 30 minutes. The patient is asked to call the office and arrange follow up in one month.     Renee Negrete MD, MD [facility]  [phone]

## 2022-01-18 NOTE — ANESTHESIA POSTPROCEDURE EVALUATION
Department of Anesthesiology  Postprocedure Note    Patient: Nayan Adams  MRN: [id number]  YOB: 1945  Date of evaluation: 1/18/2022  Time:  12:20 PM     Procedure Summary     Date: 01/18/22 Room / Location: [address] [place] [address]    Anesthesia Start: 0709 Anesthesia Stop: 3993    Procedures:       EGD DIAGNOSTIC ONLY (N/A )      EGD FOREIGN BODY REMOVAL Diagnosis:       Dysphagia, pharyngoesophageal      (Dysphagia, pharyngoesophageal [R13.14])    Surgeons: Deven Villalobos MD Responsible Provider: Alden Rodriguez MD    Anesthesia Type: general ASA Status: 3          Anesthesia Type: general    Edita Phase I: Edita Score: 10    Edita Phase II: Edita Score: 7    Last vitals: Reviewed and per EMR flowsheets.        Anesthesia Post Evaluation    Comments: Postoperative Anesthesia Note    Name:    Nayan Adams  MRN:      [id number]    Patient Vitals in the past 12 hrs:  01/18/22 1010, BP:110/60, Pulse:70, Resp:20, SpO2:95 %  01/18/22 1005, BP:(!) 115/57, Pulse:69, Resp:20, SpO2:96 %  01/18/22 0955, BP:104/63, Pulse:69, Resp:20, SpO2:96 %  01/18/22 0949, BP:(!) 102/59, Pulse:71, Resp:20, SpO2:95 %  01/18/22 0944, BP:97/61, Pulse:78, Resp:24, SpO2:97 %  01/18/22 0939, BP:(!) 89/54, Pulse:88, Resp:24, SpO2:96 %  01/18/22 0935, SpO2:92 %  01/18/22 0934, BP:(!) 86/53, Pulse:90, Resp:26, SpO2:91 %  01/18/22 0929, BP:(!) 78/52, Pulse:97, Resp:26, SpO2:94 %  01/18/22 0846, BP:128/70, Temp:98.9 °F (37.2 °C), Pulse:92, Resp:16, SpO2:98 %     LABS:    CBC  Lab Results       Component                Value               Date/Time                  WBC                      8.5                 03/15/2021 04:34 AM        HGB                      9.7 (L)             03/15/2021 04:34 AM        HCT                      30.0 (L)            03/15/2021 04:34 AM        PLT                      88 (L)              03/15/2021 04:34 AM   RENAL  Lab Results       Component                Value Date/Time                  NA                       134 (L)             03/15/2021 04:33 AM        K                        4.7                 03/15/2021 04:33 AM        K                        4.1                 03/11/2021 11:19 PM        CL                       105                 03/15/2021 04:33 AM        CO2                      24                  03/15/2021 04:33 AM        BUN                      16                  03/15/2021 04:33 AM        CREATININE               0.9                 03/15/2021 04:33 AM        GLUCOSE                  123 (H)             03/15/2021 04:33 AM   COAGS  Lab Results       Component                Value               Date/Time                  PROTIME                  13.2                03/14/2021 08:15 AM        INR                      1.14                03/14/2021 08:15 AM        APTT                     25.5                03/12/2021 10:10 AM     Intake & Output:  @82PDEH@    Nausea & Vomiting:  No    Level of Consciousness:  Awake    Pain Assessment:  Adequate analgesia    Anesthesia Complications:  No apparent anesthetic complications    SUMMARY      Vital signs stable  OK to discharge from Stage I post anesthesia care.   Care transferred from Anesthesiology department on discharge from perioperative area

## 2022-08-29 ENCOUNTER — HOSPITAL ENCOUNTER (OUTPATIENT)
Dept: WOMENS IMAGING | Age: 77
Discharge: HOME | End: 2022-08-29
Payer: MEDICARE

## 2022-08-29 VITALS — BODY MASS INDEX: 17.24 KG/M2 | HEIGHT: 64 IN | WEIGHT: 101 LBS

## 2022-08-29 DIAGNOSIS — Z12.31 VISIT FOR SCREENING MAMMOGRAM: ICD-10-CM

## 2022-08-29 PROCEDURE — 77063 BREAST TOMOSYNTHESIS BI: CPT

## 2022-08-31 ENCOUNTER — TELEPHONE (OUTPATIENT)
Dept: WOMENS IMAGING | Age: 77
End: 2022-08-31

## 2022-09-08 ENCOUNTER — HOSPITAL ENCOUNTER (OUTPATIENT)
Dept: WOMENS IMAGING | Age: 77
End: 2022-09-08
Payer: MEDICARE

## 2022-09-08 ENCOUNTER — HOSPITAL ENCOUNTER (OUTPATIENT)
Dept: WOMENS IMAGING | Age: 77
Discharge: HOME | End: 2022-09-08
Payer: MEDICARE

## 2022-09-08 DIAGNOSIS — R92.8 ABNORMAL MAMMOGRAM: ICD-10-CM

## 2022-09-08 PROCEDURE — G0279 TOMOSYNTHESIS, MAMMO: HCPCS

## 2023-01-18 NOTE — PROGRESS NOTES
Obstructive Sleep Apnea (LINDA) Screening     Patient:  Sandee Duffy    YOB: 1945      Medical Record #:  [id number]                     Date:  1/18/2023     1. Are you a loud and/or regular snorer? []  Yes       [x] No    2. Have you been observed to gasp or stop breathing during sleep? []  Yes       [x] No    3. Do you feel tired or groggy upon awakening or do you awaken with a headache?           []  Yes       [] No    4. Are you often tired or fatigued during the wake time hours? []  Yes       [] No    5. Do you fall asleep sitting, reading, watching TV or driving? []  Yes       [] No    6. Do you often have problems with memory or concentration? []  Yes       [] No    **If patient's score is ? 3 they are considered high risk for LINDA. An Anesthesia provider will evaluate the patient and develop a plan of care the day of surgery. Note:  If the patient's BMI is more than 35 kg m¯² , has neck circumference > 40 cm, and/or high blood pressure the risk is greater (© American Sleep Apnea Association, 2006).

## 2023-01-25 ENCOUNTER — HOSPITAL ENCOUNTER (OUTPATIENT)
Age: 78
Setting detail: OUTPATIENT SURGERY
Discharge: HOME | End: 2023-01-25
Attending: INTERNAL MEDICINE | Admitting: INTERNAL MEDICINE
Payer: MEDICARE

## 2023-01-25 ENCOUNTER — ANESTHESIA (OUTPATIENT)
Dept: ENDOSCOPY | Age: 78
End: 2023-01-25
Payer: MEDICARE

## 2023-01-25 ENCOUNTER — ANESTHESIA EVENT (OUTPATIENT)
Dept: ENDOSCOPY | Age: 78
End: 2023-01-25
Payer: MEDICARE

## 2023-01-25 VITALS
HEART RATE: 72 BPM | BODY MASS INDEX: 17.93 KG/M2 | HEIGHT: 64 IN | OXYGEN SATURATION: 99 % | SYSTOLIC BLOOD PRESSURE: 128 MMHG | RESPIRATION RATE: 16 BRPM | TEMPERATURE: 97 F | DIASTOLIC BLOOD PRESSURE: 63 MMHG | WEIGHT: 105 LBS

## 2023-01-25 PROCEDURE — 3700000001 HC ADD 15 MINUTES (ANESTHESIA): Performed by: INTERNAL MEDICINE

## 2023-01-25 PROCEDURE — 6360000002 HC RX W HCPCS

## 2023-01-25 PROCEDURE — 7100000010 HC PHASE II RECOVERY - FIRST 15 MIN: Performed by: INTERNAL MEDICINE

## 2023-01-25 PROCEDURE — 2580000003 HC RX 258: Performed by: INTERNAL MEDICINE

## 2023-01-25 PROCEDURE — 2709999900 HC NON-CHARGEABLE SUPPLY: Performed by: INTERNAL MEDICINE

## 2023-01-25 PROCEDURE — 2580000003 HC RX 258

## 2023-01-25 PROCEDURE — 3700000000 HC ANESTHESIA ATTENDED CARE: Performed by: INTERNAL MEDICINE

## 2023-01-25 PROCEDURE — 3609017100 HC EGD: Performed by: INTERNAL MEDICINE

## 2023-01-25 PROCEDURE — 7100000011 HC PHASE II RECOVERY - ADDTL 15 MIN: Performed by: INTERNAL MEDICINE

## 2023-01-25 PROCEDURE — 3609012300 HC EGD BAND LIGATION ESOPHGEAL/GASTRIC VARICES: Performed by: INTERNAL MEDICINE

## 2023-01-25 PROCEDURE — 2500000003 HC RX 250 WO HCPCS

## 2023-01-25 RX ORDER — SODIUM CHLORIDE, SODIUM LACTATE, POTASSIUM CHLORIDE, CALCIUM CHLORIDE 600; 310; 30; 20 MG/100ML; MG/100ML; MG/100ML; MG/100ML
INJECTION, SOLUTION INTRAVENOUS CONTINUOUS PRN
Status: DISCONTINUED | OUTPATIENT
Start: 2023-01-25 | End: 2023-01-25 | Stop reason: SDUPTHER

## 2023-01-25 RX ORDER — SODIUM CHLORIDE, SODIUM LACTATE, POTASSIUM CHLORIDE, CALCIUM CHLORIDE 600; 310; 30; 20 MG/100ML; MG/100ML; MG/100ML; MG/100ML
INJECTION, SOLUTION INTRAVENOUS CONTINUOUS
Status: DISCONTINUED | OUTPATIENT
Start: 2023-01-25 | End: 2023-01-25 | Stop reason: HOSPADM

## 2023-01-25 RX ORDER — LIDOCAINE HYDROCHLORIDE 10 MG/ML
0.1 INJECTION, SOLUTION EPIDURAL; INFILTRATION; INTRACAUDAL; PERINEURAL ONCE
Status: DISCONTINUED | OUTPATIENT
Start: 2023-01-25 | End: 2023-01-25 | Stop reason: HOSPADM

## 2023-01-25 RX ORDER — PROPOFOL 10 MG/ML
INJECTION, EMULSION INTRAVENOUS PRN
Status: DISCONTINUED | OUTPATIENT
Start: 2023-01-25 | End: 2023-01-25 | Stop reason: SDUPTHER

## 2023-01-25 RX ORDER — LIDOCAINE HYDROCHLORIDE 20 MG/ML
INJECTION, SOLUTION EPIDURAL; INFILTRATION; INTRACAUDAL; PERINEURAL PRN
Status: DISCONTINUED | OUTPATIENT
Start: 2023-01-25 | End: 2023-01-25 | Stop reason: SDUPTHER

## 2023-01-25 RX ADMIN — PROPOFOL 40 MG: 10 INJECTION, EMULSION INTRAVENOUS at 09:48

## 2023-01-25 RX ADMIN — LIDOCAINE HYDROCHLORIDE 40 MG: 20 INJECTION, SOLUTION EPIDURAL; INFILTRATION; INTRACAUDAL; PERINEURAL at 09:40

## 2023-01-25 RX ADMIN — SODIUM CHLORIDE, POTASSIUM CHLORIDE, SODIUM LACTATE AND CALCIUM CHLORIDE: 600; 310; 30; 20 INJECTION, SOLUTION INTRAVENOUS at 08:54

## 2023-01-25 RX ADMIN — SODIUM CHLORIDE, SODIUM LACTATE, POTASSIUM CHLORIDE, AND CALCIUM CHLORIDE: .6; .31; .03; .02 INJECTION, SOLUTION INTRAVENOUS at 09:40

## 2023-01-25 RX ADMIN — PROPOFOL 40 MG: 10 INJECTION, EMULSION INTRAVENOUS at 09:44

## 2023-01-25 ASSESSMENT — PAIN - FUNCTIONAL ASSESSMENT: PAIN_FUNCTIONAL_ASSESSMENT: 0-10

## 2023-01-25 ASSESSMENT — PAIN SCALES - GENERAL
PAINLEVEL_OUTOF10: 0
PAINLEVEL_OUTOF10: 0

## 2023-01-25 NOTE — PROCEDURES
Endoscopy Note    Patient: Snow Magaña  : 1945      Procedure: Esophagogastroduodenoscopy with esophageal band ligation    Date:  2023     Surgeon:  Abraham Steel MD     Referring Physician:  Makenzie Shipman MD      History:      INDICATION: Esophageal varices PBC cirrhosis     ASA: 3  SEDATION: MAC         Operative Surgeon: Abraham Steel MD  Scope Type: Gastroscope      Preoperative Diagnosis: History of esophageal varices  Postoperative Diagnosis: Esophageal varices portal hypertensive gastropathy    Procedure Performed: EGD    Procedure Details:    With the patient in left lateral position the endoscope was passed through the hypopharynx into the esophagus. The scope as then passed through the esophagus to the second portion of the duodenum. All visualized portions were carefully inspected. The gastric air was suctioned and the scope as removed. Patient tolerated the procedure well. Findings and maneuvers are discussed below. Complications:  None  Estimated Blood Loss: minimal to none    Post Operative Findings:   Esophagus: There was a large varix column extending to the GE junction which was at 33 cm. Moderate size hiatal hernia. 1 band was placed with good decompression. Evidence of prior banding scars were present the distal esophagus. Stomach: Hiatal hernia and portal hypertensive gastropathy otherwise normal    Duodenum: Normal    Plan: Follow-up in 3 months for repeat esophageal band ligation with Dr. Emily Falk. Soft diet for the next few days. Signed By: MD Abraham Anna MD,   [facility]  [phone]    Please note that some or all of this record was generated using voice recognition software. If there are any questions about the content of this document, please contact the author as some errors in translation may have occurred.

## 2023-01-25 NOTE — DISCHARGE INSTRUCTIONS
PATIENT INSTRUCTIONS  POST-SEDATION    Konstantin Juaresdoch Juan Carlosjosi          IMMEDIATELY FOLLOWING PROCEDURE:    Do not drive or operate machinery for the first twenty four hours after surgery. Do not make any important decisions for twenty four hours after surgery or while taking narcotic pain medications or sedatives. You should NOT BE LEFT UNATTENDED OR ALONE. A responsible adult should be with you for the rest of the day of your procedure and also during the night for your protection and safety. You may experience some light headedness. Rest at home with activity as tolerated. You may not need to go to bed, but it is important to rest for the next 24 hours. You should not engage in athletic sports such as basketball, volleyball, jogging, skating, or activities requiring refined motor skills for 24 hours. If you develop intractable nausea and vomiting or a severe headache please notify your doctor immediately. You are not expected to have any fever, but if you feel warm, take your temperature. If you have a fever 101 degrees or higher, call your doctor. If you have had an Endoscopy:   *Eat lightly for your first meal and gradually resume your normal / prescribed diet. DO NOT eat or drink until your gag reflex returns. *If you have a sore throat you may use lozenges, or salt water gargles. ONCE YOU ARE HOME, IF YOU SHOULD HAVE:  Difficulty in breathing, persistent nausea or vomiting, bleeding you feel is excessive, or pain that is unusual, increased abdominal bloating, or any swelling, fever / chills, call your physician. If you cannot contact your physician, but feel that your signs and symptoms need a physician's attention, go to the Emergency Department. FOLLOW-UP:    Please follow up with @PCP@ as scheduled or needed. Dr. Brody Mcrae MD will call you with the biopsy findings. Call Dr. Brody Mcrae MD if there are any GI concerns.  [phone]      You may be receiving a follow up phone call to ask about your care. Upper GI Endoscopy: What to Expect at [address] [place] [address]  After you have an endoscopy, you will stay at the hospital or clinic for 1 to 2 hours. This will allow the medicine to wear off. You will be able to go home after your doctor or nurse checks to make sure you are not having any problems. You may have to stay overnight if you had treatment during the test. You may have a sore throat for a day or two after the test.  This care sheet gives you a general idea about what to expect after the test.  How can you care for yourself at home? Activity  Rest as much as you need to after you go home. You should be able to go back to your usual activities the day after the test.  Diet  Follow your doctor's directions for eating after the test.  Drink plenty of fluids (unless your doctor has told you not to). Medications  If you have a sore throat the day after the test, use an over-the-counter spray to numb your throat. Follow-up care is a key part of your treatment and safety. Be sure to make and go to all appointments, and call your doctor if you are having problems. It's also a good idea to know your test results and keep a list of the medicines you take. When should you call for help? Call 911 anytime you think you may need emergency care. For example, call if:    You passed out (loses consciousness). You have trouble breathing. You pass maroon or bloody stools. Call your doctor now or seek immediate medical care if:    You have pain that does not get better after your take pain medicine. You have new or worse belly pain. You have blood in your stools. You are sick to your stomach and cannot keep fluids down. You have a fever. You cannot pass stools or gas. Watch closely for changes in your health, and be sure to contact your doctor if:    Your throat still hurts after a day or two. You do not get better as expected.    Where can you learn more? Go to [email or web address] and sign in to your [employer] account. Enter H332 in the [employer] box to learn more about \"Upper GI Endoscopy: What to Expect at Home. \"     If you do not have an account, please click on the \"Sign Up Now\" link. Current as of: May 12, 2017  Content Version: 11.6  © 7176-8886 [employer]. Care instructions adapted under license by [facility] ([facility]). If you have questions about a medical condition or this instruction, always ask your healthcare professional. [address] any warranty or liability for your use of this information.

## 2023-01-25 NOTE — H&P
[address]   Pre-operative History and Physical    Patient: Peggy Bailey  : 1945  Acct#:     HISTORY OF PRESENT ILLNESS:    The patient is a 65 y.o. female who presents for history of PBC and cirrhosis with esophageal varices s/p band ligation on 2 separate occasions. EGD to assess    Indications: History of PBC and cirrhosis    Past Medical History:        Diagnosis Date    Acid reflux     Arthritis     COPD (chronic obstructive pulmonary disease) ([address]     Pneumonia     Thyroid disease     hypothyroidism      Past Surgical History:        Procedure Laterality Date    COLONOSCOPY N/A 3/12/2021    COLONOSCOPY DIAGNOSTIC performed by Sascha Steiner MD at [address] [facility] ([address] [place] [address])      UPPER GASTROINTESTINAL ENDOSCOPY N/A 3/12/2021    EGD BAND LIGATION performed by Sascha Steiner MD at [address] 2021    EGD BAND LIGATION performed by Karli Montano MD at [address] [place] [address] 2021    EGD performed by Karli Montano MD at [address] [place] [address]  2021    EGD BAND LIGATION performed by Karli Montano MD at [address] [place] [address] 2022    EGD DIAGNOSTIC ONLY performed by Karli Montano MD at [address] [place] [address]  2022    EGD [address] performed by Karli Montano MD at [address] [place] [address]      Medications Prior to Admission:   No current facility-administered medications on file prior to encounter.      Current Outpatient Medications on File Prior to Encounter   Medication Sig Dispense Refill    Multiple Vitamin (MULTIVITAMIN PO) Take by mouth daily      ursodiol (ACTIGALL) 500 MG tablet Take 750 mg by mouth daily      pantoprazole (PROTONIX) 40 MG tablet Take 1 tablet by mouth 2 times daily (before meals) (Patient taking differently: Take 40 mg by mouth as needed ) 60 tablet 5    levothyroxine (SYNTHROID) 50 MCG tablet TK 1 T PO QD  1    vitamin B-12 (CYANOCOBALAMIN) 1000 MCG tablet Take 1,000 mcg by mouth daily       vitamin D (CHOLECALCIFEROL) 1000 UNIT TABS tablet Take 1,000 Units by mouth daily       guaifenesin-dextromethorphan (ROBITUSSIN DM) 100-10 MG/5ML syrup Take 10 mLs by mouth 3 times daily as needed for Cough. 120 mL 0    albuterol (PROVENTIL HFA) 108 (90 BASE) MCG/ACT inhaler Inhale 2 puffs into the lungs every 4 hours as needed for Shortness of Breath. 1 Inhaler 0        Allergies:  Patient has no known allergies.     Social History:   Social History     Socioeconomic History    Marital status:      Spouse name: Not on file    Number of children: Not on file    Years of education: Not on file    Highest education level: Not on file   Occupational History    Not on file   Tobacco Use    Smoking status: Former     Packs/day: 0.25     Types: Cigarettes     Quit date:      Years since quittin.0    Smokeless tobacco: Never   Vaping Use    Vaping Use: Never used   Substance and Sexual Activity    Alcohol use: Not Currently    Drug use: Never    Sexual activity: Not on file   Other Topics Concern    Not on file   Social History Narrative    Not on file     Social Determinants of Health     Financial Resource Strain: Not on file   Food Insecurity: Not on file   Transportation Needs: Not on file   Physical Activity: Not on file   Stress: Not on file   Social Connections: Not on file   Intimate Partner Violence: Not on file   Housing Stability: Not on file      Family History:       Problem Relation Age of Onset    Alzheimer's Disease Mother     Cancer Father         PHYSICAL EXAM:      /66   Pulse 83   Temp 97 °F (36.1 °C) (Temporal)   Resp 16   Ht 5' 4\" (1.626 m)   Wt 105 lb (47.6 kg)   SpO2 98%   BMI 18.02 kg/m²  I        Heart:  Normal apical impulse, regular rate and rhythm, normal S1 and S2, no S3 or S4, and no murmur noted    Lungs:  No increased work of breathing, good air exchange, clear to auscultation bilaterally, no crackles or wheezing    Abdomen:  No scars, normal bowel sounds, soft, non-distended, non-tender, no masses palpated, no hepatosplenomegally      ASA Class  ASA 3 - Patient with moderate systemic disease with functional limitations    Mallampati Class: 3      ASSESSMENT AND PLAN:    1.  Patient is a suitable candidate for endoscopic procedure and attendant anesthesia  2.  Risks, benefits, alternatives of procedure discussed in detail with patient including risks of bleeding, infection, perforation, risks of sedation, risks of missed lesions. The patient wishes to proceed.

## 2023-01-25 NOTE — ANESTHESIA POSTPROCEDURE EVALUATION
Department of Anesthesiology  Postprocedure Note    Patient: Leesa Gutierrez  MRN: [id number]  YOB: 1945  Date of evaluation: 1/25/2023      Procedure Summary     Date: 01/25/23 Room / Location: [facility] [address] [place] [address]    Anesthesia Start: 0940 Anesthesia Stop: 0957    Procedures:       EGD      EGD BAND LIGATION Diagnosis:       Primary biliary cholangitis ([address]      Secondary esophageal varices without bleeding ([address]      (Primary biliary cholangitis ([address] [K74.3] Secondary esophageal varices without bleeding ([address] [I85.10])    Surgeons: Nancy Ugarte MD Responsible Provider: Manuela Herrera MD    Anesthesia Type: MAC ASA Status: 3          Anesthesia Type: No value filed.     Edita Phase I: Edita Score: 10    Edita Phase II: Edita Score: 9      Anesthesia Post Evaluation    Patient location during evaluation: PACU  Patient participation: complete - patient participated  Level of consciousness: awake  Pain score: 0  Airway patency: patent  Nausea & Vomiting: no nausea  Complications: no  Cardiovascular status: blood pressure returned to baseline  Respiratory status: acceptable  Hydration status: euvolemic

## 2023-01-25 NOTE — ANESTHESIA PRE PROCEDURE
Department of Anesthesiology  Preprocedure Note       Name:  Treasure Fatima   Age:  77 y.o.  :  1945                                          MRN:  [id number]         Date:  2023      Surgeon: Surgeon(s):  Gian Gonsales MD    Procedure: Procedure(s):  EGD    Medications prior to admission:   Prior to Admission medications    Medication Sig Start Date End Date Taking? Authorizing Provider   Multiple Vitamin (MULTIVITAMIN PO) Take by mouth daily    Historical Provider, MD   ursodiol (ACTIGALL) 500 MG tablet Take 750 mg by mouth daily    Historical Provider, MD   pantoprazole (PROTONIX) 40 MG tablet Take 1 tablet by mouth 2 times daily (before meals)  Patient taking differently: Take 40 mg by mouth as needed  3/15/21   Yogesh Kimbrough MD   levothyroxine (SYNTHROID) 50 MCG tablet TK 1 T PO QD 18   Historical Provider, MD   vitamin B-12 (CYANOCOBALAMIN) 1000 MCG tablet Take 1,000 mcg by mouth daily     Historical Provider, MD   vitamin D (CHOLECALCIFEROL) 1000 UNIT TABS tablet Take 1,000 Units by mouth daily     Historical Provider, MD   guaifenesin-dextromethorphan (ROBITUSSIN DM) 100-10 MG/5ML syrup Take 10 mLs by mouth 3 times daily as needed for Cough. 12   Delmer Gutierrez MD   albuterol (PROVENTIL HFA) 108 (90 BASE) MCG/ACT inhaler Inhale 2 puffs into the lungs every 4 hours as needed for Shortness of Breath. 12   Delmer Gutierrez MD       Current medications:    Current Facility-Administered Medications   Medication Dose Route Frequency Provider Last Rate Last Admin   • lactated ringers IV soln infusion   IntraVENous Continuous Gian Gonsales MD       • lidocaine PF 1 % injection 0.1 mL  0.1 mL IntraDERmal Once Gian Gonsales MD           Allergies:  No Known Allergies    Problem List:    Patient Active Problem List   Diagnosis Code   • CAP (community acquired pneumonia) J18.9   • PNA (pneumonia) J18.9   • History of tobacco abuse Z87.891   • Sepsis (HCC) A41.9   • Acute upper GI  bleed K92.2    Acute GI bleeding K92.2       Past Medical History:        Diagnosis Date    Acid reflux     Arthritis     COPD (chronic obstructive pulmonary disease) ([facility] [address]     Pneumonia     Thyroid disease     hypothyroidism       Past Surgical History:        Procedure Laterality Date    COLONOSCOPY N/A 3/12/2021    COLONOSCOPY DIAGNOSTIC [facility] [address] [facility]      UPPER GASTROINTESTINAL ENDOSCOPY N/A 3/12/2021    EGD BAND LIGATION performed by Rylee Horn MD at [address] [facility] [address] 2021    EGD BAND LIGATION performed by January Schmitz MD at [address] [place] [address] 2021    EGD performed by January Schmitz MD at [address] [place] [address]  2021    EGD BAND LIGATION performed by January Schmitz MD at [address] [place] [address] 2022    EGD DIAGNOSTIC ONLY performed by January Schmitz MD at [address] [place] [address]  2022    EGD FOREIGN BODY REMOVAL performed by January Schmitz MD at [address] [place] [address] History:    Social History     Tobacco Use    Smoking status: Former     Packs/day: 0.25     Types: Cigarettes     Quit date: 2000     Years since quittin.0    Smokeless tobacco: Never   Substance Use Topics    Alcohol use: Not Currently                                Counseling given: Not Answered      Vital Signs (Current):   Vitals:    23 1256   Weight: 105 lb (47.6 kg)   Height: 5' 4\" (1.626 m)                                              BP Readings from Last 3 Encounters:   22 110/60   22 (!) 74/47   21 105/62       NPO Status:                                                                                 BMI:   Wt Readings from Last 3 Encounters:   23 105 lb (47.6 kg)   22 101 lb (45.8 kg)   12/17/21 108 lb (49 kg)     Body mass index is 18.02 kg/m². CBC:   Lab Results   Component Value Date/Time    WBC 8.5 03/15/2021 04:34 AM    RBC 3.56 03/15/2021 04:34 AM    HGB 9.7 03/15/2021 04:34 AM    HCT 30.0 03/15/2021 04:34 AM    MCV 84.3 03/15/2021 04:34 AM    RDW 16.9 03/15/2021 04:34 AM    PLT 88 03/15/2021 04:34 AM       CMP:   Lab Results   Component Value Date/Time     03/15/2021 04:33 AM    K 4.7 03/15/2021 04:33 AM    K 4.1 03/11/2021 11:19 PM     03/15/2021 04:33 AM    CO2 24 03/15/2021 04:33 AM    BUN 16 03/15/2021 04:33 AM    CREATININE 0.9 03/15/2021 04:33 AM    GFRAA >60 03/15/2021 04:33 AM    GFRAA >60 10/28/2010 12:45 AM    AGRATIO 0.5 03/30/2019 04:00 PM    LABGLOM >60 03/15/2021 04:33 AM    GLUCOSE 123 03/15/2021 04:33 AM    PROT 5.5 03/15/2021 04:33 AM    PROT 7.9 10/28/2010 12:45 AM    CALCIUM 8.0 03/15/2021 04:33 AM    BILITOT 0.5 03/15/2021 04:33 AM    ALKPHOS 88 03/15/2021 04:33 AM    AST 38 03/15/2021 04:33 AM    ALT 16 03/15/2021 04:33 AM       POC Tests: No results for input(s): POCGLU, POCNA, POCK, POCCL, POCBUN, POCHEMO, POCHCT in the last 72 hours.     Coags:   Lab Results   Component Value Date/Time    PROTIME 13.2 03/14/2021 08:15 AM    INR 1.14 03/14/2021 08:15 AM    APTT 25.5 03/12/2021 10:10 AM       HCG (If Applicable): No results found for: PREGTESTUR, PREGSERUM, HCG, HCGQUANT     ABGs: No results found for: PHART, PO2ART, MJI1NPC, PKQ9HXN, BEART, O6THGRKO     Type & Screen (If Applicable):  No results found for: LABABO, LABRH    Drug/Infectious Status (If Applicable):  No results found for: HIV, HEPCAB    COVID-19 Screening (If Applicable):   Lab Results   Component Value Date/Time    COVID19 Not Detected 01/14/2022 03:00 PM           Anesthesia Evaluation  Patient summary reviewed and Nursing notes reviewed  Airway: Mallampati: II          Dental: normal exam         Pulmonary:normal exam    (+) pneumonia:  COPD: Cardiovascular:Negative CV ROS                      Neuro/Psych:   Negative Neuro/Psych ROS              GI/Hepatic/Renal:   (+) GERD:,           Endo/Other:    (+) hypothyroidism::., .                 Abdominal:             Vascular: negative vascular ROS. Other Findings:           Anesthesia Plan      MAC     ASA 3       Induction: intravenous. Anesthetic plan and risks discussed with patient. Plan discussed with CRNA.     Attending anesthesiologist reviewed and agrees with Preprocedure content                RAMIREZ Whipple MD   1/25/2023

## 2023-02-22 ENCOUNTER — HOSPITAL ENCOUNTER (OUTPATIENT)
Dept: GENERAL RADIOLOGY | Age: 78
Discharge: HOME | End: 2023-02-22
Payer: MEDICARE

## 2023-02-22 ENCOUNTER — HOSPITAL ENCOUNTER (OUTPATIENT)
Age: 78
Discharge: HOME | End: 2023-02-22
Payer: MEDICARE

## 2023-02-22 DIAGNOSIS — R09.89 ABNORMAL CHEST SOUNDS: ICD-10-CM

## 2023-02-22 PROCEDURE — 71046 X-RAY EXAM CHEST 2 VIEWS: CPT

## 2023-08-02 NOTE — PROGRESS NOTES
Lilton Angie    Age 77 y.o.    female    1945    MRN [id number]    8/10/2023  Arrival Time_____________  OR Time____________[address]     Procedure(s):  ESOPHAGOGASTRODUODENOSCOPY                      General    Surgeon(s):  Natasha Cheomabel, MD       Phone [phone] ([place])     [facility]  [facility]         Work  _____________________________________________________________________  _____________________________________________________________________  _____________________________________________________________________  _____________________________________________________________________  _____________________________________________________________________    PCP _____________________________ Phone_________________     H&P__________________Bringing      Chart            Epic   DOS      Called________  EKG__________________Bringing      Chart            Epic   DOS      Called________  LAB__________________ Bringing      Chart            Epic   DOS      Called________  Cardiac Clearance_______Bringing      Chart            Epic      DOS      Called________    Cardiologist________________________ Phone___________________________    ? Shinto concerns / Waiver on Chart            PAT Communications________________  ? Pre-op Instructions Given [address]          _________________________________  ? Directions to Surgery Center                          _________________________________  ? Transportation Home_______________      __________________________________  ?  Crutches/Walker__________________        __________________________________    ________Pre-op Orders   _______Transcribed    _______Wt.  ________Pharmacy          _______SCD  ______VTE     ______[facility]  _______  Surgery Consent    _______  Anesthesia Consent         COVID DATE______________LOCATION________________ RESULT__________

## 2023-08-04 NOTE — PROGRESS NOTES
Date and time of surgery :     8/10/23 at 1100         Arrival Time:  1000     Bring Picture ID and insurance card. Please wear simple, loose fitting clothing to the hospital.   Marielle Zhu not bring valuables (money, credit cards, checkbooks, etc.)   Do not wear any makeup (including  eye makeup) and no nail polish or artificial nails on your fingers or toes. DO NOT wear any jewelry or piercings on day of surgery. All body piercing jewelry must be removed. If you have dentures, they will be removed before going to the OR; we will provide you a container. If you wear contact lenses or glasses, they will be removed; please bring a case for them. Shower the evening before or morning of surgery with antibacterial soap. Nothing to eat or drink after midnight the day before surgery. You may brush your teeth and gargle the morning of surgery. DO NOT SWALLOW WATER. Do not take any morning meds the day of your surgery. Aspirin, Ibuprofen, Advil, Naproxen, Vitamin E and other Anti-inflammatory products and supplements should be stopped for 5 -7days before surgery or as directed by your physician. Do not smoke or drink any alcoholic beverages 24 hours prior to surgery. This includes NA Beer. Refrain from the usage of any recreational drugs, including non-prescribed prescription drugs. You MUST plan for a responsible adult to stay on site while you are here and take you home after your surgery. You will not be allowed to leave alone or drive yourself home. It is strongly suggested someone stay with you the first 24 hrs. Your surgery will be cancelled if you do not have a ride home. To help prevent infection, change your sheets the night before surgery. If you  have a Living Will and Durable Power of  for Healthcare, please bring in a copy. Notify your Surgeon if you develop any illness between now and time of surgery.  Cough, cold, fever, sore throat, nausea, vomiting, etc.  Please notify your surgeon if

## 2023-08-10 ENCOUNTER — ANESTHESIA EVENT (OUTPATIENT)
Dept: ENDOSCOPY | Age: 78
End: 2023-08-10
Payer: MEDICARE

## 2023-08-10 ENCOUNTER — ANESTHESIA (OUTPATIENT)
Dept: ENDOSCOPY | Age: 78
End: 2023-08-10
Payer: MEDICARE

## 2023-08-10 ENCOUNTER — HOSPITAL ENCOUNTER (OUTPATIENT)
Age: 78
Setting detail: OUTPATIENT SURGERY
Discharge: HOME | End: 2023-08-10
Attending: INTERNAL MEDICINE | Admitting: INTERNAL MEDICINE
Payer: MEDICARE

## 2023-08-10 VITALS
SYSTOLIC BLOOD PRESSURE: 106 MMHG | TEMPERATURE: 98 F | BODY MASS INDEX: 17.58 KG/M2 | OXYGEN SATURATION: 95 % | DIASTOLIC BLOOD PRESSURE: 63 MMHG | RESPIRATION RATE: 14 BRPM | HEIGHT: 64 IN | HEART RATE: 70 BPM | WEIGHT: 103 LBS

## 2023-08-10 PROCEDURE — 2500000003 HC RX 250 WO HCPCS: Performed by: NURSE ANESTHETIST, CERTIFIED REGISTERED

## 2023-08-10 PROCEDURE — 2580000003 HC RX 258: Performed by: ANESTHESIOLOGY

## 2023-08-10 PROCEDURE — 2709999900 HC NON-CHARGEABLE SUPPLY: Performed by: INTERNAL MEDICINE

## 2023-08-10 PROCEDURE — 3609017100 HC EGD: Performed by: INTERNAL MEDICINE

## 2023-08-10 PROCEDURE — 3700000000 HC ANESTHESIA ATTENDED CARE: Performed by: INTERNAL MEDICINE

## 2023-08-10 PROCEDURE — 6360000002 HC RX W HCPCS: Performed by: NURSE ANESTHETIST, CERTIFIED REGISTERED

## 2023-08-10 PROCEDURE — 7100000010 HC PHASE II RECOVERY - FIRST 15 MIN: Performed by: INTERNAL MEDICINE

## 2023-08-10 PROCEDURE — 7100000011 HC PHASE II RECOVERY - ADDTL 15 MIN: Performed by: INTERNAL MEDICINE

## 2023-08-10 RX ORDER — SODIUM CHLORIDE 9 MG/ML
INJECTION, SOLUTION INTRAVENOUS PRN
Status: DISCONTINUED | OUTPATIENT
Start: 2023-08-10 | End: 2023-08-10 | Stop reason: HOSPADM

## 2023-08-10 RX ORDER — SODIUM CHLORIDE 0.9 % (FLUSH) 0.9 %
5-40 SYRINGE (ML) INJECTION EVERY 12 HOURS SCHEDULED
Status: DISCONTINUED | OUTPATIENT
Start: 2023-08-10 | End: 2023-08-10 | Stop reason: HOSPADM

## 2023-08-10 RX ORDER — LIDOCAINE HYDROCHLORIDE 10 MG/ML
0.3 INJECTION, SOLUTION EPIDURAL; INFILTRATION; INTRACAUDAL; PERINEURAL
Status: DISCONTINUED | OUTPATIENT
Start: 2023-08-10 | End: 2023-08-10 | Stop reason: HOSPADM

## 2023-08-10 RX ORDER — PROPOFOL 10 MG/ML
INJECTION, EMULSION INTRAVENOUS PRN
Status: DISCONTINUED | OUTPATIENT
Start: 2023-08-10 | End: 2023-08-10 | Stop reason: SDUPTHER

## 2023-08-10 RX ORDER — SODIUM CHLORIDE, SODIUM LACTATE, POTASSIUM CHLORIDE, CALCIUM CHLORIDE 600; 310; 30; 20 MG/100ML; MG/100ML; MG/100ML; MG/100ML
INJECTION, SOLUTION INTRAVENOUS CONTINUOUS
Status: DISCONTINUED | OUTPATIENT
Start: 2023-08-10 | End: 2023-08-10 | Stop reason: HOSPADM

## 2023-08-10 RX ORDER — LIDOCAINE HYDROCHLORIDE 20 MG/ML
INJECTION, SOLUTION INFILTRATION; PERINEURAL PRN
Status: DISCONTINUED | OUTPATIENT
Start: 2023-08-10 | End: 2023-08-10 | Stop reason: SDUPTHER

## 2023-08-10 RX ORDER — SODIUM CHLORIDE 0.9 % (FLUSH) 0.9 %
5-40 SYRINGE (ML) INJECTION PRN
Status: DISCONTINUED | OUTPATIENT
Start: 2023-08-10 | End: 2023-08-10 | Stop reason: HOSPADM

## 2023-08-10 RX ADMIN — SODIUM CHLORIDE, POTASSIUM CHLORIDE, SODIUM LACTATE AND CALCIUM CHLORIDE: 600; 310; 30; 20 INJECTION, SOLUTION INTRAVENOUS at 10:37

## 2023-08-10 RX ADMIN — PROPOFOL 50 MG: 10 INJECTION, EMULSION INTRAVENOUS at 12:06

## 2023-08-10 RX ADMIN — LIDOCAINE HYDROCHLORIDE 60 MG: 20 INJECTION, SOLUTION INFILTRATION; PERINEURAL at 12:06

## 2023-08-10 RX ADMIN — PROPOFOL 50 MG: 10 INJECTION, EMULSION INTRAVENOUS at 12:10

## 2023-08-10 ASSESSMENT — PAIN SCALES - GENERAL
PAINLEVEL_OUTOF10: 0

## 2023-08-10 ASSESSMENT — LIFESTYLE VARIABLES: SMOKING_STATUS: 0

## 2023-08-10 ASSESSMENT — COPD QUESTIONNAIRES: CAT_SEVERITY: MODERATE

## 2023-08-10 ASSESSMENT — PAIN - FUNCTIONAL ASSESSMENT: PAIN_FUNCTIONAL_ASSESSMENT: 0-10

## 2023-08-10 NOTE — ANESTHESIA POSTPROCEDURE EVALUATION
Department of Anesthesiology  Postprocedure Note    Patient: Davy Santoyo  MRN: [id number]  YOB: 1945  Date of evaluation: 8/10/2023      Procedure Summary     Date: 08/10/23 Room / Location: [facility] [address] [place] [address]    Anesthesia Start: 4205 Anesthesia Stop: 9056    Procedure: ESOPHAGOGASTRODUODENOSCOPY Diagnosis:       Primary biliary cholangitis ([address])      (Primary biliary cholangitis ([address]) [K74.3])    Surgeons: Tristian Miller MD Responsible Provider: Rush Douglas DO    Anesthesia Type: general ASA Status: 3          Anesthesia Type: No value filed.     Edita Phase I: Edita Score: 10    Edita Phase II: Edita Score: 10      Anesthesia Post Evaluation    Patient location during evaluation: PACU  Patient participation: complete - patient participated  Level of consciousness: awake and alert  Pain score: 0  Airway patency: patent  Nausea & Vomiting: no nausea and no vomiting  Complications: no  Cardiovascular status: blood pressure returned to baseline and hemodynamically stable  Respiratory status: acceptable and room air  Hydration status: euvolemic  Pain management: adequate

## 2023-08-10 NOTE — ANESTHESIA PRE PROCEDURE
PHART, PO2ART, EPJ6MBO, CXY3KDR, BEART, W9DMYAFN     Type & Screen (If Applicable):  No results found for: LABABO, LABRH    Drug/Infectious Status (If Applicable):  No results found for: HIV, HEPCAB    COVID-19 Screening (If Applicable):   Lab Results   Component Value Date/Time    COVID19 Not Detected 01/14/2022 03:00 PM           Anesthesia Evaluation  Patient summary reviewed  Airway: Mallampati: II  TM distance: <3 FB   Neck ROM: full  Mouth opening: < 3 FB   Dental:    (+) edentulous      Pulmonary:   (+) COPD: moderate,  rhonchi (CLEAR WITH COUGH):bilateral     (-) wheezes, rales and not a current smoker (2000)          Patient did not smoke on day of surgery. Cardiovascular:  Exercise tolerance: poor (<4 METS),       (-) hypertension, past MI and orthopnea      Rhythm: regular  Rate: normal                    Neuro/Psych:      (-) seizures, TIA and CVA           GI/Hepatic/Renal:   (+) GERD:,      (-) no morbid obesity       Endo/Other:    (+) hypothyroidism: arthritis: OA., .    (-) diabetes mellitus, hyperthyroidism               Abdominal:         (-) obese       Vascular:     - DVT and PE. Other Findings: PRODUCTIVE COUGH          Anesthesia Plan      general     ASA 3       Induction: intravenous. continuous noninvasive hemodynamic monitor    Anesthetic plan and risks discussed with patient.                         DO Aurea   8/10/2023

## 2023-08-10 NOTE — PROCEDURES
Endoscopy Note    Patient: Brandin Perez  : 1945  CSN:     Procedure: Esophagogastroduodenoscopy     Date:  8/10/2023     Surgeon:  Femi Dhaliwal MD     Referring Provider:  Dr. Saba Beatty    Preoperative Diagnosis:  Cirrhosis, esophageal varices    Postoperative Diagnosis:  Small varices flatten with insufflation, scarring seen from previous banding. Anesthesia:  Propofol    EBL: <5 mL    Indications: This is a 77y.o. year old female who presents today with Screening for esophageal varices. Description of Procedure:  Informed consent was obtained from the patient after explanation of indications, benefits and possible risks and complications of the procedure. The patient was then taken to the endoscopy suite, placed in the left lateral decubitus position and the above IV sedation was administrered. The Olympus videoendoscope was passed through the hypopharynx into the esophagus. The scope was advanced all the way to the duodenum. The mucosa in the duodenal bulb, post bulbar region in the descending duodenum appeared normal.  The mucosa in the antrum appeared consistent with portal gastropathy. The mucosa in the remaining part of the stomach and retroflexion appeared normal.  Today small esophageal varices which flatten with insufflation were seen. Scarring from previous banding was also seen. The scope was withdrawn and the procedure was terminated. Gastric or Duodenal ulcer present: No      The patient tolerated the procedure well and was taken to the post anesthesia care unit in good condition. Impression:  -Portal gastropathy, small esophogeal varices      Recommendations: Repeat in 1 year.     Femi Dhaliwal MD, MD  [facility]  [phone]

## 2023-08-10 NOTE — PROGRESS NOTES
Awake and alert with no complaints. VS stable. Granddaughter here. Discharge instructions reviewed with patient/responsible adult and understanding verbalized. Discharge instructions signed and copies given.

## 2023-08-10 NOTE — PROGRESS NOTES
Received in PACU. Report received from endo nurse and CRNA. Patient sleeping with no response to verbal stimuli.

## 2024-03-12 ENCOUNTER — HOSPITAL ENCOUNTER (OUTPATIENT)
Dept: ULTRASOUND IMAGING | Age: 79
Discharge: HOME | End: 2024-03-12
Payer: MEDICARE

## 2024-03-12 ENCOUNTER — HOSPITAL ENCOUNTER (OUTPATIENT)
Age: 79
Discharge: HOME | End: 2024-03-12
Payer: MEDICARE

## 2024-03-12 DIAGNOSIS — K74.3 PRIMARY BILIARY CIRRHOSIS (HCC): ICD-10-CM

## 2024-03-12 LAB
ALBUMIN SERPL-MCNC: 3.4 G/DL (ref 3.4–5)
ALBUMIN/GLOB SERPL: 0.7 {RATIO} (ref 1.1–2.2)
ALP SERPL-CCNC: 194 U/L (ref 40–129)
ALT SERPL-CCNC: 17 U/L (ref 10–40)
ANION GAP SERPL CALCULATED.3IONS-SCNC: 11 MMOL/L (ref 3–16)
AST SERPL-CCNC: 28 U/L (ref 15–37)
BASOPHILS # BLD: 0 K/UL (ref 0–0.2)
BASOPHILS NFR BLD: 0.6 %
BILIRUB SERPL-MCNC: 0.5 MG/DL (ref 0–1)
BUN SERPL-MCNC: 25 MG/DL (ref 7–20)
CALCIUM SERPL-MCNC: 9.3 MG/DL (ref 8.3–10.6)
CHLORIDE SERPL-SCNC: 107 MMOL/L (ref 99–110)
CO2 SERPL-SCNC: 21 MMOL/L (ref 21–32)
CREAT SERPL-MCNC: 1.1 MG/DL (ref 0.6–1.2)
DEPRECATED RDW RBC AUTO: 14.3 % (ref 12.4–15.4)
EOSINOPHIL # BLD: 0.7 K/UL (ref 0–0.6)
EOSINOPHIL NFR BLD: 11 %
GFR SERPLBLD CREATININE-BSD FMLA CKD-EPI: 51 ML/MIN/{1.73_M2}
GGT SERPL-CCNC: 19 U/L (ref 5–36)
GLUCOSE SERPL-MCNC: 157 MG/DL (ref 70–99)
HCT VFR BLD AUTO: 36.2 % (ref 36–48)
HGB BLD-MCNC: 12.3 G/DL (ref 12–16)
INR PPP: 1.08 (ref 0.84–1.16)
LYMPHOCYTES # BLD: 0.9 K/UL (ref 1–5.1)
LYMPHOCYTES NFR BLD: 13.7 %
MCH RBC QN AUTO: 30.6 PG (ref 26–34)
MCHC RBC AUTO-ENTMCNC: 34 G/DL (ref 31–36)
MCV RBC AUTO: 89.9 FL (ref 80–100)
MONOCYTES # BLD: 0.4 K/UL (ref 0–1.3)
MONOCYTES NFR BLD: 6.6 %
NEUTROPHILS # BLD: 4.6 K/UL (ref 1.7–7.7)
NEUTROPHILS NFR BLD: 68.1 %
PLATELET # BLD AUTO: 135 K/UL (ref 135–450)
PMV BLD AUTO: 8.9 FL (ref 5–10.5)
POTASSIUM SERPL-SCNC: 4.1 MMOL/L (ref 3.5–5.1)
PROT SERPL-MCNC: 8 G/DL (ref 6.4–8.2)
PROTHROMBIN TIME: 14 SEC (ref 11.5–14.8)
RBC # BLD AUTO: 4.03 M/UL (ref 4–5.2)
SODIUM SERPL-SCNC: 139 MMOL/L (ref 136–145)
WBC # BLD AUTO: 6.8 K/UL (ref 4–11)

## 2024-03-12 PROCEDURE — 85610 PROTHROMBIN TIME: CPT

## 2024-03-12 PROCEDURE — 76705 ECHO EXAM OF ABDOMEN: CPT

## 2024-03-12 PROCEDURE — 82977 ASSAY OF GGT: CPT

## 2024-03-12 PROCEDURE — 80053 COMPREHEN METABOLIC PANEL: CPT

## 2024-03-12 PROCEDURE — 36415 COLL VENOUS BLD VENIPUNCTURE: CPT

## 2024-03-12 PROCEDURE — 85025 COMPLETE CBC W/AUTO DIFF WBC: CPT

## 2024-03-12 PROCEDURE — 82105 ALPHA-FETOPROTEIN SERUM: CPT

## 2024-03-14 LAB — AFP-TM SERPL-MCNC: 5.1 UG/L

## 2024-08-17 ENCOUNTER — HOSPITAL ENCOUNTER (INPATIENT)
Age: 79
LOS: 3 days | Discharge: HOME HEALTH | DRG: 572 | End: 2024-08-20
Attending: STUDENT IN AN ORGANIZED HEALTH CARE EDUCATION/TRAINING PROGRAM
Payer: MEDICARE

## 2024-08-17 ENCOUNTER — APPOINTMENT (OUTPATIENT)
Dept: GENERAL RADIOLOGY | Age: 79
DRG: 572 | End: 2024-08-17
Payer: MEDICARE

## 2024-08-17 DIAGNOSIS — M86.9 OSTEOMYELITIS OF LEFT FOOT, UNSPECIFIED TYPE (HCC): Primary | ICD-10-CM

## 2024-08-17 PROBLEM — L03.032 CELLULITIS OF FIFTH TOE OF LEFT FOOT: Status: ACTIVE | Noted: 2024-08-17

## 2024-08-17 LAB
ALBUMIN SERPL-MCNC: 3.2 G/DL (ref 3.4–5)
ALBUMIN/GLOB SERPL: 0.6 {RATIO} (ref 1.1–2.2)
ALP SERPL-CCNC: 174 U/L (ref 40–129)
ALT SERPL-CCNC: 18 U/L (ref 10–40)
ANION GAP SERPL CALCULATED.3IONS-SCNC: 13 MMOL/L (ref 3–16)
AST SERPL-CCNC: 49 U/L (ref 15–37)
BASOPHILS # BLD: 0 K/UL (ref 0–0.2)
BASOPHILS NFR BLD: 0.8 %
BILIRUB SERPL-MCNC: 0.5 MG/DL (ref 0–1)
BUN SERPL-MCNC: 20 MG/DL (ref 7–20)
CALCIUM SERPL-MCNC: 9.2 MG/DL (ref 8.3–10.6)
CHLORIDE SERPL-SCNC: 99 MMOL/L (ref 99–110)
CO2 SERPL-SCNC: 21 MMOL/L (ref 21–32)
CREAT SERPL-MCNC: 1.2 MG/DL (ref 0.6–1.2)
DEPRECATED RDW RBC AUTO: 14.5 % (ref 12.4–15.4)
EOSINOPHIL # BLD: 0.6 K/UL (ref 0–0.6)
EOSINOPHIL NFR BLD: 10.9 %
GFR SERPLBLD CREATININE-BSD FMLA CKD-EPI: 46 ML/MIN/{1.73_M2}
GLUCOSE SERPL-MCNC: 169 MG/DL (ref 70–99)
HCT VFR BLD AUTO: 34.6 % (ref 36–48)
HGB BLD-MCNC: 11.4 G/DL (ref 12–16)
INR PPP: 1.15 (ref 0.85–1.15)
LACTATE BLDV-SCNC: 1.8 MMOL/L (ref 0.4–2)
LYMPHOCYTES # BLD: 0.8 K/UL (ref 1–5.1)
LYMPHOCYTES NFR BLD: 14.6 %
MCH RBC QN AUTO: 30 PG (ref 26–34)
MCHC RBC AUTO-ENTMCNC: 33 G/DL (ref 31–36)
MCV RBC AUTO: 91 FL (ref 80–100)
MONOCYTES # BLD: 0.5 K/UL (ref 0–1.3)
MONOCYTES NFR BLD: 8.3 %
NEUTROPHILS # BLD: 3.8 K/UL (ref 1.7–7.7)
NEUTROPHILS NFR BLD: 65.4 %
PLATELET # BLD AUTO: 105 K/UL (ref 135–450)
PMV BLD AUTO: 8 FL (ref 5–10.5)
POTASSIUM SERPL-SCNC: 4.9 MMOL/L (ref 3.5–5.1)
PROT SERPL-MCNC: 8.4 G/DL (ref 6.4–8.2)
PROTHROMBIN TIME: 14.9 SEC (ref 11.9–14.9)
RBC # BLD AUTO: 3.8 M/UL (ref 4–5.2)
SODIUM SERPL-SCNC: 133 MMOL/L (ref 136–145)
WBC # BLD AUTO: 5.8 K/UL (ref 4–11)

## 2024-08-17 PROCEDURE — 80053 COMPREHEN METABOLIC PANEL: CPT

## 2024-08-17 PROCEDURE — 87040 BLOOD CULTURE FOR BACTERIA: CPT

## 2024-08-17 PROCEDURE — 85610 PROTHROMBIN TIME: CPT

## 2024-08-17 PROCEDURE — 6360000002 HC RX W HCPCS: Performed by: NURSE PRACTITIONER

## 2024-08-17 PROCEDURE — 99285 EMERGENCY DEPT VISIT HI MDM: CPT

## 2024-08-17 PROCEDURE — 6360000002 HC RX W HCPCS: Performed by: PHYSICIAN ASSISTANT

## 2024-08-17 PROCEDURE — 2580000003 HC RX 258: Performed by: NURSE PRACTITIONER

## 2024-08-17 PROCEDURE — 73630 X-RAY EXAM OF FOOT: CPT

## 2024-08-17 PROCEDURE — 85025 COMPLETE CBC W/AUTO DIFF WBC: CPT

## 2024-08-17 PROCEDURE — 83605 ASSAY OF LACTIC ACID: CPT

## 2024-08-17 PROCEDURE — 83036 HEMOGLOBIN GLYCOSYLATED A1C: CPT

## 2024-08-17 PROCEDURE — 36415 COLL VENOUS BLD VENIPUNCTURE: CPT

## 2024-08-17 PROCEDURE — 2580000003 HC RX 258: Performed by: PHYSICIAN ASSISTANT

## 2024-08-17 PROCEDURE — 6370000000 HC RX 637 (ALT 250 FOR IP): Performed by: NURSE PRACTITIONER

## 2024-08-17 PROCEDURE — 1200000000 HC SEMI PRIVATE

## 2024-08-17 RX ORDER — PANTOPRAZOLE SODIUM 40 MG/1
40 TABLET, DELAYED RELEASE ORAL
Status: DISCONTINUED | OUTPATIENT
Start: 2024-08-18 | End: 2024-08-20 | Stop reason: HOSPADM

## 2024-08-17 RX ORDER — SODIUM CHLORIDE 9 MG/ML
INJECTION, SOLUTION INTRAVENOUS PRN
Status: DISCONTINUED | OUTPATIENT
Start: 2024-08-17 | End: 2024-08-20 | Stop reason: HOSPADM

## 2024-08-17 RX ORDER — FLUTICASONE FUROATE, UMECLIDINIUM BROMIDE AND VILANTEROL TRIFENATATE 100; 62.5; 25 UG/1; UG/1; UG/1
1 POWDER RESPIRATORY (INHALATION) DAILY
COMMUNITY

## 2024-08-17 RX ORDER — PREDNISOLONE ACETATE 10 MG/ML
2 SUSPENSION/ DROPS OPHTHALMIC 4 TIMES DAILY
COMMUNITY
Start: 2024-06-27

## 2024-08-17 RX ORDER — SODIUM CHLORIDE 9 MG/ML
INJECTION, SOLUTION INTRAVENOUS CONTINUOUS
Status: DISCONTINUED | OUTPATIENT
Start: 2024-08-17 | End: 2024-08-19

## 2024-08-17 RX ORDER — SODIUM CHLORIDE 0.9 % (FLUSH) 0.9 %
5-40 SYRINGE (ML) INJECTION EVERY 12 HOURS SCHEDULED
Status: DISCONTINUED | OUTPATIENT
Start: 2024-08-17 | End: 2024-08-20 | Stop reason: HOSPADM

## 2024-08-17 RX ORDER — URSODIOL 300 MG/1
300 CAPSULE ORAL 2 TIMES DAILY
Status: DISCONTINUED | OUTPATIENT
Start: 2024-08-17 | End: 2024-08-17

## 2024-08-17 RX ORDER — FLUTICASONE PROPIONATE 50 MCG
1 SPRAY, SUSPENSION (ML) NASAL DAILY
COMMUNITY

## 2024-08-17 RX ORDER — MORPHINE SULFATE 2 MG/ML
2 INJECTION, SOLUTION INTRAMUSCULAR; INTRAVENOUS EVERY 4 HOURS PRN
Status: DISCONTINUED | OUTPATIENT
Start: 2024-08-17 | End: 2024-08-20 | Stop reason: HOSPADM

## 2024-08-17 RX ORDER — LEVOTHYROXINE SODIUM 0.05 MG/1
50 TABLET ORAL DAILY
Status: DISCONTINUED | OUTPATIENT
Start: 2024-08-18 | End: 2024-08-20 | Stop reason: HOSPADM

## 2024-08-17 RX ORDER — SODIUM CHLORIDE 0.9 % (FLUSH) 0.9 %
5-40 SYRINGE (ML) INJECTION PRN
Status: DISCONTINUED | OUTPATIENT
Start: 2024-08-17 | End: 2024-08-20 | Stop reason: HOSPADM

## 2024-08-17 RX ORDER — ALBUTEROL SULFATE 90 UG/1
2 AEROSOL, METERED RESPIRATORY (INHALATION) EVERY 4 HOURS PRN
Status: DISCONTINUED | OUTPATIENT
Start: 2024-08-17 | End: 2024-08-20 | Stop reason: HOSPADM

## 2024-08-17 RX ORDER — PROCHLORPERAZINE EDISYLATE 5 MG/ML
10 INJECTION INTRAMUSCULAR; INTRAVENOUS EVERY 6 HOURS PRN
Status: DISCONTINUED | OUTPATIENT
Start: 2024-08-17 | End: 2024-08-20 | Stop reason: HOSPADM

## 2024-08-17 RX ORDER — URSODIOL 500 MG/1
750 TABLET, FILM COATED ORAL DAILY
Status: DISCONTINUED | OUTPATIENT
Start: 2024-08-18 | End: 2024-08-20 | Stop reason: HOSPADM

## 2024-08-17 RX ORDER — ACETAMINOPHEN 650 MG/1
650 SUPPOSITORY RECTAL EVERY 6 HOURS PRN
Status: DISCONTINUED | OUTPATIENT
Start: 2024-08-17 | End: 2024-08-20 | Stop reason: HOSPADM

## 2024-08-17 RX ORDER — POLYETHYLENE GLYCOL 3350 17 G/17G
17 POWDER, FOR SOLUTION ORAL DAILY PRN
Status: DISCONTINUED | OUTPATIENT
Start: 2024-08-17 | End: 2024-08-20 | Stop reason: HOSPADM

## 2024-08-17 RX ORDER — ACETAMINOPHEN 325 MG/1
650 TABLET ORAL EVERY 6 HOURS PRN
Status: DISCONTINUED | OUTPATIENT
Start: 2024-08-17 | End: 2024-08-20 | Stop reason: HOSPADM

## 2024-08-17 RX ADMIN — SODIUM CHLORIDE: 9 INJECTION, SOLUTION INTRAVENOUS at 21:07

## 2024-08-17 RX ADMIN — PIPERACILLIN AND TAZOBACTAM 3375 MG: 3; .375 INJECTION, POWDER, LYOPHILIZED, FOR SOLUTION INTRAVENOUS at 20:14

## 2024-08-17 RX ADMIN — SODIUM CHLORIDE, PRESERVATIVE FREE 10 ML: 5 INJECTION INTRAVENOUS at 21:11

## 2024-08-17 RX ADMIN — CEFEPIME 1000 MG: 1 INJECTION, POWDER, FOR SOLUTION INTRAMUSCULAR; INTRAVENOUS at 21:30

## 2024-08-17 ASSESSMENT — PAIN - FUNCTIONAL ASSESSMENT: PAIN_FUNCTIONAL_ASSESSMENT: 0-10

## 2024-08-17 ASSESSMENT — LIFESTYLE VARIABLES: HOW OFTEN DO YOU HAVE A DRINK CONTAINING ALCOHOL: NEVER

## 2024-08-17 ASSESSMENT — PAIN SCALES - GENERAL: PAINLEVEL_OUTOF10: 7

## 2024-08-17 NOTE — H&P
UPPER GASTROINTESTINAL ENDOSCOPY N/A 8/10/2023    ESOPHAGOGASTRODUODENOSCOPY performed by Violet Kelly MD at [facility] ENDOSCOPY       Medications Prior to Admission:   Prior to Admission medications    Medication Sig Start Date End Date Taking? Authorizing Provider   Multiple Vitamin (MULTIVITAMIN PO) Take by mouth daily    Ladi Figueroa MD   ursodiol (ACTIGALL) 500 MG tablet Take 1.5 tablets by mouth daily    Ladi Figueroa MD   pantoprazole (PROTONIX) 40 MG tablet Take 1 tablet by mouth 2 times daily (before meals) 3/15/21   Yogesh Kimbrough MD   levothyroxine (SYNTHROID) 50 MCG tablet TK 1 T PO QD 12/31/18   Ladi Figueroa MD   vitamin B-12 (CYANOCOBALAMIN) 1000 MCG tablet Take 1 tablet by mouth daily    Ladi Figueroa MD   vitamin D (CHOLECALCIFEROL) 1000 UNIT TABS tablet Take 1 tablet by mouth daily    Ladi Figueroa MD   guaifenesin-dextromethorphan (ROBITUSSIN DM) 100-10 MG/5ML syrup Take 10 mLs by mouth 3 times daily as needed for Cough. 1/1/12   Delmer Gutierrez MD   albuterol (PROVENTIL HFA) 108 (90 BASE) MCG/ACT inhaler Inhale 2 puffs into the lungs every 4 hours as needed for Shortness of Breath. 1/1/12   Delmer Gutierrez MD       Labs: Personally reviewed and interpreted for clinical significance.   Recent Labs     08/17/24  1736   WBC 5.8   HGB 11.4*   HCT 34.6*   *     Recent Labs     08/17/24  1736   *   K 4.9   CL 99   CO2 21   BUN 20   CREATININE 1.2   CALCIUM 9.2     Recent Labs     08/17/24  1736   AST 49*   ALT 18   BILITOT 0.5   ALKPHOS 174*     Recent Labs     08/17/24  1736   LACTA 1.8     Anticipated co-signer, Dr. Dennis Kent, APRN - CNP

## 2024-08-18 ENCOUNTER — APPOINTMENT (OUTPATIENT)
Dept: MRI IMAGING | Age: 79
DRG: 572 | End: 2024-08-18
Payer: MEDICARE

## 2024-08-18 LAB
ALBUMIN SERPL-MCNC: 2.6 G/DL (ref 3.4–5)
ALBUMIN/GLOB SERPL: 0.6 {RATIO} (ref 1.1–2.2)
ALP SERPL-CCNC: 150 U/L (ref 40–129)
ALT SERPL-CCNC: 14 U/L (ref 10–40)
ANION GAP SERPL CALCULATED.3IONS-SCNC: 11 MMOL/L (ref 3–16)
AST SERPL-CCNC: 24 U/L (ref 15–37)
BASOPHILS # BLD: 0 K/UL (ref 0–0.2)
BASOPHILS NFR BLD: 0.5 %
BILIRUB SERPL-MCNC: 0.5 MG/DL (ref 0–1)
BUN SERPL-MCNC: 20 MG/DL (ref 7–20)
CALCIUM SERPL-MCNC: 8.9 MG/DL (ref 8.3–10.6)
CHLORIDE SERPL-SCNC: 106 MMOL/L (ref 99–110)
CO2 SERPL-SCNC: 19 MMOL/L (ref 21–32)
CREAT SERPL-MCNC: 1 MG/DL (ref 0.6–1.2)
DEPRECATED RDW RBC AUTO: 14.6 % (ref 12.4–15.4)
EOSINOPHIL # BLD: 0.7 K/UL (ref 0–0.6)
EOSINOPHIL NFR BLD: 12.6 %
EST. AVERAGE GLUCOSE BLD GHB EST-MCNC: 142.7 MG/DL
GFR SERPLBLD CREATININE-BSD FMLA CKD-EPI: 57 ML/MIN/{1.73_M2}
GLUCOSE SERPL-MCNC: 136 MG/DL (ref 70–99)
HBA1C MFR BLD: 6.6 %
HCT VFR BLD AUTO: 33.4 % (ref 36–48)
HGB BLD-MCNC: 10.9 G/DL (ref 12–16)
LYMPHOCYTES # BLD: 0.8 K/UL (ref 1–5.1)
LYMPHOCYTES NFR BLD: 13.5 %
MCH RBC QN AUTO: 29.9 PG (ref 26–34)
MCHC RBC AUTO-ENTMCNC: 32.6 G/DL (ref 31–36)
MCV RBC AUTO: 91.5 FL (ref 80–100)
MONOCYTES # BLD: 0.4 K/UL (ref 0–1.3)
MONOCYTES NFR BLD: 7.9 %
NEUTROPHILS # BLD: 3.6 K/UL (ref 1.7–7.7)
NEUTROPHILS NFR BLD: 65.5 %
PLATELET # BLD AUTO: 103 K/UL (ref 135–450)
PMV BLD AUTO: 8.3 FL (ref 5–10.5)
POTASSIUM SERPL-SCNC: 3.8 MMOL/L (ref 3.5–5.1)
PROT SERPL-MCNC: 7.1 G/DL (ref 6.4–8.2)
RBC # BLD AUTO: 3.65 M/UL (ref 4–5.2)
SODIUM SERPL-SCNC: 136 MMOL/L (ref 136–145)
VANCOMYCIN SERPL-MCNC: 8.9 UG/ML
WBC # BLD AUTO: 5.6 K/UL (ref 4–11)

## 2024-08-18 PROCEDURE — 85025 COMPLETE CBC W/AUTO DIFF WBC: CPT

## 2024-08-18 PROCEDURE — 2580000003 HC RX 258: Performed by: STUDENT IN AN ORGANIZED HEALTH CARE EDUCATION/TRAINING PROGRAM

## 2024-08-18 PROCEDURE — 0JBR0ZZ EXCISION OF LEFT FOOT SUBCUTANEOUS TISSUE AND FASCIA, OPEN APPROACH: ICD-10-PCS | Performed by: PODIATRIST

## 2024-08-18 PROCEDURE — 36415 COLL VENOUS BLD VENIPUNCTURE: CPT

## 2024-08-18 PROCEDURE — 6370000000 HC RX 637 (ALT 250 FOR IP): Performed by: NURSE PRACTITIONER

## 2024-08-18 PROCEDURE — 73718 MRI LOWER EXTREMITY W/O DYE: CPT

## 2024-08-18 PROCEDURE — 6360000002 HC RX W HCPCS: Performed by: STUDENT IN AN ORGANIZED HEALTH CARE EDUCATION/TRAINING PROGRAM

## 2024-08-18 PROCEDURE — 80053 COMPREHEN METABOLIC PANEL: CPT

## 2024-08-18 PROCEDURE — 80202 ASSAY OF VANCOMYCIN: CPT

## 2024-08-18 PROCEDURE — 2580000003 HC RX 258: Performed by: NURSE PRACTITIONER

## 2024-08-18 PROCEDURE — 6360000002 HC RX W HCPCS: Performed by: NURSE PRACTITIONER

## 2024-08-18 PROCEDURE — 1200000000 HC SEMI PRIVATE

## 2024-08-18 RX ADMIN — POLYETHYLENE GLYCOL 3350 17 G: 17 POWDER, FOR SOLUTION ORAL at 12:33

## 2024-08-18 RX ADMIN — CEFEPIME 1000 MG: 1 INJECTION, POWDER, FOR SOLUTION INTRAMUSCULAR; INTRAVENOUS at 20:11

## 2024-08-18 RX ADMIN — PANTOPRAZOLE SODIUM 40 MG: 40 TABLET, DELAYED RELEASE ORAL at 18:54

## 2024-08-18 RX ADMIN — VANCOMYCIN HYDROCHLORIDE 750 MG: 750 INJECTION, POWDER, LYOPHILIZED, FOR SOLUTION INTRAVENOUS at 19:01

## 2024-08-18 RX ADMIN — LEVOTHYROXINE SODIUM 50 MCG: 0.05 TABLET ORAL at 09:15

## 2024-08-18 RX ADMIN — URSODIOL 750 MG: 500 TABLET, FILM COATED ORAL at 09:16

## 2024-08-18 RX ADMIN — VANCOMYCIN HYDROCHLORIDE 1000 MG: 1 INJECTION, POWDER, LYOPHILIZED, FOR SOLUTION INTRAVENOUS at 01:50

## 2024-08-18 RX ADMIN — SODIUM CHLORIDE, PRESERVATIVE FREE 10 ML: 5 INJECTION INTRAVENOUS at 09:15

## 2024-08-18 RX ADMIN — SODIUM CHLORIDE: 9 INJECTION, SOLUTION INTRAVENOUS at 09:25

## 2024-08-18 NOTE — CONSULTS
Pharmacy Note  Vancomycin Consult    Treasure Fatima is a 79 y.o. female started on Vancomycin for Skin/Soft Tissue infection for a duration of 7 days; consult received from NICK Wright to manage therapy. Also receiving the following antibiotics: cefepime.    Allergies:  Patient has no known allergies.     Tmax: 98  Recent Labs     08/17/24  1736   CREATININE 1.2     Recent Labs     08/17/24  1736   WBC 5.8     Estimated Creatinine Clearance: 25 mL/min (based on SCr of 1.2 mg/dL).  No intake or output data in the 24 hours ending 08/17/24 2111  Wt Readings from Last 1 Encounters:   08/17/24 42.2 kg (93 lb)       Body mass index is 15.96 kg/m².    Date Culture Results   8/17 Blood x2 ordered, yet to be drawn     Assessment/Plan:  Will initiate Vancomycin with a one-time loading dose of 1000 mg x1, followed by pulse dosing given reduced renal function.     Vancomycin level ordered for 8/18 at 1600.   Timing of vancomycin levels will be determined based on culture results, renal function, and clinical response.     Thank you for the consult,  Mora Amaro, PharmD 8/17/2024 9:11 PM    Vancomycin Day of Therapy 2/7  Indication: SSTI  Micro: BC pending   Current Dosing Method: Intermittent Dosing by Levels  Therapeutic Goal: Trough ~ 15 mg/L  Recent Labs     08/17/24  1736 08/18/24  0633   WBC 5.8 5.6   CREATININE 1.2 1.0   Estimated Creatinine Clearance: 29 mL/min (based on SCr of 1 mg/dL).  Current Dose / Plan:   Next Vancomycin level ordered for 8/18 at 1600.  Will continue to monitor clinical condition and make adjustments to regimen as appropriate.  Tomy De Los Santos, Sixto    8/18/2024 7:59 AM     ------------------------------------------------------------------------------------  8/18/2024 5:47 PM                                           Vancomycin Progress Note  Day: 2/7 Indication: SSTI Other Antibiotics: cefepime     Recent Labs     08/18/24  1602   VANCORANDOM 8.9     Recent Labs     08/17/24  1731 
14.9 08/17/2024 09:30 PM    INR 1.15 08/17/2024 09:30 PM     Last 3 Troponin:    Lab Results   Component Value Date/Time    TROPONINI <0.01 03/11/2021 11:19 PM     HgBA1c:    Lab Results   Component Value Date/Time    LABA1C 5.3 03/12/2021 05:45 PM       Imaging:  Three Views, Left Foot 8/17/2024:  IMPRESSION:  1. No acute fracture or traumatic malalignment involving the left foot  2. Diffuse osteopenia  3. Soft tissue swelling adjacent to the head of the 5th metatarsal, with a  questionable area of lucency within the head of the metatarsal.  If  osteomyelitis is a clinical concern, MR imaging of the foot should be  Performed    Procedure:  Bedside debridement of ulceration, left foot: I obtained consent from the patient.  I then used a number fifteen blade to sharply debride the patient's ulceration of necrotic tissue down to the level of subcutaneous tissue.  The debridement was excisional in nature.  I then applied a sterile bandage.     Impression/Recommendations:    The patient is a pleasant 79 year old woman with:  1) Ulcer to fat with associated cellulitis, left foot  - Debrided the ulceration as described above  - Antibiotics per the Primary Team  - Will order an MRI  - Heel weight-bearing only on the left  2) Pain, left foot    Thank you for the opportunity to take part in this patient's care.  Please contact me with any questions.    Damien Goldstein DPM, RADHA, FACFAS  Office: [phone]  Cell: [phone]

## 2024-08-18 NOTE — PLAN OF CARE
Problem: Pain  Goal: Verbalizes/displays adequate comfort level or baseline comfort level  Outcome: Progressing  Flowsheets (Taken 8/18/2024 1614)  Verbalizes/displays adequate comfort level or baseline comfort level:   Encourage patient to monitor pain and request assistance   Assess pain using appropriate pain scale     Problem: Safety - Adult  Goal: Free from fall injury  Outcome: Progressing  Flowsheets (Taken 8/18/2024 1614)  Free From Fall Injury: Instruct family/caregiver on patient safety     Problem: ABCDS Injury Assessment  Goal: Absence of physical injury  Outcome: Progressing

## 2024-08-18 NOTE — ED NOTES
Patient Name: Treasure Fatima  :  1945  79 y.o.  MRN:  [id number]  Preferred Name  Treasure  ED Room #:    Family/Caregiver Present no  Restraints no  Sitter no  Sepsis Risk Score      Situation  Code Status: Prior No additional code details.    Allergies: Patient has no known allergies.  Weight: Patient Vitals for the past 96 hrs (Last 3 readings):   Weight   24 1647 42.2 kg (93 lb)     Arrived from: home  Chief Complaint:   Chief Complaint   Patient presents with    Foot Pain     L foot callus causing pain. Started 2-3 months, worsening pain 24 after home tx     Hospital Problem/Diagnosis:  Principal Problem:    Cellulitis of fifth toe of left foot  Resolved Problems:    * No resolved hospital problems. *    Imaging:   XR FOOT LEFT (MIN 3 VIEWS)   Final Result   1. No acute fracture or traumatic malalignment involving the left foot   2. Diffuse osteopenia   3. Soft tissue swelling adjacent to the head of the 5th metatarsal, with a   questionable area of lucency within the head of the metatarsal.  If   osteomyelitis is a clinical concern, MR imaging of the foot should be   performed           Abnormal labs:   Abnormal Labs Reviewed   CBC WITH AUTO DIFFERENTIAL - Abnormal; Notable for the following components:       Result Value    RBC 3.80 (*)     Hemoglobin 11.4 (*)     Hematocrit 34.6 (*)     Platelets 105 (*)     Lymphocytes Absolute 0.8 (*)     All other components within normal limits   COMPREHENSIVE METABOLIC PANEL W/ REFLEX TO MG FOR LOW K - Abnormal; Notable for the following components:    Sodium 133 (*)     Glucose 169 (*)     Est, Glom Filt Rate 46 (*)     Total Protein 8.4 (*)     Albumin 3.2 (*)     Albumin/Globulin Ratio 0.6 (*)     Alkaline Phosphatase 174 (*)     AST 49 (*)     All other components within normal limits     Critical values: no  Intervention for critical value(s):       Abnormal Assessment Findings:     Background  History:   Past Medical History:   Diagnosis

## 2024-08-18 NOTE — ACP (ADVANCE CARE PLANNING)
Advance Care Planning     Advance Care Planning Inpatient Note  [facility] Department    Today's Date: 8/18/2024  Unit: [place] C3 TELE/MED SURG/ONC    Received request from HealthCare Provider.  Upon review of chart and communication with care team, patient's decision making abilities are not in question.. Patient was/were present in the room during visit.    Goals of ACP Conversation:  Discuss advance care planning documents    Health Care Decision Makers:       Primary Decision Maker: ERNESTINA REYNA - [phone]    Secondary Decision Maker: ABHIJEET LACY - Daughter-in-Law - [phone]  Summary:  No Decision Maker named by patient at this time  Treasure shared her daughter and other family members may visit later today.   Advance Care Planning Documents (Patient Wishes):  Treasure desired to review ACP documents and complete at a later time.      Assessment:  Treasure engaged in conversation with the . She desires to review ACP documents with her family prior to completion.     Interventions:  Provided education on documents for clarity and greater understanding   provided ACP documents to review with family members as requested.     Care Preferences Communicated:   No    Outcomes/Plan:  ACP Discussion: Postponed  Treasure will review ACP documents with her family and shared that she may set up an outpatient appointment to complete documentation.     Electronically signed by Chaplain AUTUMN on 8/18/2024 at 12:30 PM

## 2024-08-18 NOTE — CARE COORDINATION
Case Management Assessment  Initial Evaluation    Date/Time of Evaluation: 8/18/2024 9:55 AM  Assessment Completed by: Martha Harvey RN    If patient is discharged prior to next notation, then this note serves as note for discharge by case management.    Patient Name: Treasure Fatima                   YOB: 1945  Diagnosis: Cellulitis of fifth toe of left foot [L03.032]  Osteomyelitis of left foot, unspecified type (HCC) [M86.9]                   Date / Time: 8/17/2024  4:43 PM    Patient Admission Status: Inpatient   Readmission Risk (Low < 19, Mod (19-27), High > 27): Readmission Risk Score: 11.3    Current PCP: Keisha Tsang MD  PCP verified by CM? Yes    Chart Reviewed: Yes      History Provided by: Patient  Patient Orientation: Alert and Oriented, Person, Place, Situation, Self    Patient Cognition: Alert    Hospitalization in the last 30 days (Readmission):  No    If yes, Readmission Assessment in CM Navigator will be completed.    Advance Directives:      Code Status: Full Code   Patient's Primary Decision Maker is: Legal Next of Kin    Primary Decision Maker: ERNESTINA REYNA - [phone]    Secondary Decision Maker: ABHIJEET LACY - Daughter-in-Law - [phone]    Discharge Planning:    Patient lives with: Children, Family Members Type of Home: House  Primary Care Giver: Self  Patient Support Systems include: Children, Family Members   Current Financial resources: Medicare  Current community resources: None  Current services prior to admission: None            Current DME:              Type of Home Care services:  None    ADLS  Prior functional level: Independent in ADLs/IADLs  Current functional level: Independent in ADLs/IADLs    PT AM-PAC:   /24  OT AM-PAC:   /24    Family can provide assistance at DC: Yes  Would you like Case Management to discuss the discharge plan with any other family members/significant others, and if so, who? No  Plans to Return to Present Housing: Yes  Other

## 2024-08-18 NOTE — PLAN OF CARE
Problem: Pain  Goal: Verbalizes/displays adequate comfort level or baseline comfort level  8/18/2024 1926 by Ann Mcadams RN  Outcome: Progressing  Verbalizes/displays adequate comfort level or baseline comfort level:   Encourage patient to monitor pain and request assistance   Assess pain using appropriate pain scale     Problem: Safety - Adult  Goal: Free from fall injury  8/18/2024 1926 by Ann Mcadams RN  Outcome: Progressing  Free From Fall Injury: Instruct family/caregiver on patient safety     Problem: ABCDS Injury Assessment  Goal: Absence of physical injury  8/18/2024 1926 by Ann Mcadams RN  Outcome: Progressing

## 2024-08-19 LAB
ALBUMIN SERPL-MCNC: 2.7 G/DL (ref 3.4–5)
ALBUMIN/GLOB SERPL: 0.7 {RATIO} (ref 1.1–2.2)
ALP SERPL-CCNC: 138 U/L (ref 40–129)
ALT SERPL-CCNC: 12 U/L (ref 10–40)
ANION GAP SERPL CALCULATED.3IONS-SCNC: 8 MMOL/L (ref 3–16)
AST SERPL-CCNC: 25 U/L (ref 15–37)
BASOPHILS # BLD: 0 K/UL (ref 0–0.2)
BASOPHILS NFR BLD: 0.9 %
BILIRUB SERPL-MCNC: 0.5 MG/DL (ref 0–1)
BUN SERPL-MCNC: 21 MG/DL (ref 7–20)
CALCIUM SERPL-MCNC: 8.5 MG/DL (ref 8.3–10.6)
CHLORIDE SERPL-SCNC: 112 MMOL/L (ref 99–110)
CO2 SERPL-SCNC: 17 MMOL/L (ref 21–32)
CREAT SERPL-MCNC: 1.1 MG/DL (ref 0.6–1.2)
DEPRECATED RDW RBC AUTO: 14.6 % (ref 12.4–15.4)
EOSINOPHIL # BLD: 0.8 K/UL (ref 0–0.6)
EOSINOPHIL NFR BLD: 15 %
GFR SERPLBLD CREATININE-BSD FMLA CKD-EPI: 51 ML/MIN/{1.73_M2}
GLUCOSE SERPL-MCNC: 109 MG/DL (ref 70–99)
HCT VFR BLD AUTO: 30.7 % (ref 36–48)
HGB BLD-MCNC: 10.1 G/DL (ref 12–16)
LYMPHOCYTES # BLD: 0.7 K/UL (ref 1–5.1)
LYMPHOCYTES NFR BLD: 13.2 %
MCH RBC QN AUTO: 30.3 PG (ref 26–34)
MCHC RBC AUTO-ENTMCNC: 33.1 G/DL (ref 31–36)
MCV RBC AUTO: 91.7 FL (ref 80–100)
MONOCYTES # BLD: 0.4 K/UL (ref 0–1.3)
MONOCYTES NFR BLD: 6.8 %
NEUTROPHILS # BLD: 3.4 K/UL (ref 1.7–7.7)
NEUTROPHILS NFR BLD: 64.1 %
PLATELET # BLD AUTO: 99 K/UL (ref 135–450)
PLATELET BLD QL SMEAR: ABNORMAL
PMV BLD AUTO: 8.5 FL (ref 5–10.5)
POTASSIUM SERPL-SCNC: 4.1 MMOL/L (ref 3.5–5.1)
PROT SERPL-MCNC: 6.5 G/DL (ref 6.4–8.2)
RBC # BLD AUTO: 3.34 M/UL (ref 4–5.2)
SLIDE REVIEW: ABNORMAL
SODIUM SERPL-SCNC: 137 MMOL/L (ref 136–145)
VANCOMYCIN SERPL-MCNC: 12 UG/ML
WBC # BLD AUTO: 5.2 K/UL (ref 4–11)

## 2024-08-19 PROCEDURE — 97166 OT EVAL MOD COMPLEX 45 MIN: CPT

## 2024-08-19 PROCEDURE — 6370000000 HC RX 637 (ALT 250 FOR IP): Performed by: NURSE PRACTITIONER

## 2024-08-19 PROCEDURE — 6360000002 HC RX W HCPCS: Performed by: NURSE PRACTITIONER

## 2024-08-19 PROCEDURE — 80202 ASSAY OF VANCOMYCIN: CPT

## 2024-08-19 PROCEDURE — 97535 SELF CARE MNGMENT TRAINING: CPT

## 2024-08-19 PROCEDURE — 2580000003 HC RX 258: Performed by: NURSE PRACTITIONER

## 2024-08-19 PROCEDURE — 36415 COLL VENOUS BLD VENIPUNCTURE: CPT

## 2024-08-19 PROCEDURE — 2580000003 HC RX 258: Performed by: STUDENT IN AN ORGANIZED HEALTH CARE EDUCATION/TRAINING PROGRAM

## 2024-08-19 PROCEDURE — 6360000002 HC RX W HCPCS: Performed by: STUDENT IN AN ORGANIZED HEALTH CARE EDUCATION/TRAINING PROGRAM

## 2024-08-19 PROCEDURE — 1200000000 HC SEMI PRIVATE

## 2024-08-19 PROCEDURE — 80053 COMPREHEN METABOLIC PANEL: CPT

## 2024-08-19 PROCEDURE — 85025 COMPLETE CBC W/AUTO DIFF WBC: CPT

## 2024-08-19 PROCEDURE — 97530 THERAPEUTIC ACTIVITIES: CPT

## 2024-08-19 PROCEDURE — 97161 PT EVAL LOW COMPLEX 20 MIN: CPT

## 2024-08-19 RX ADMIN — SODIUM CHLORIDE, PRESERVATIVE FREE 10 ML: 5 INJECTION INTRAVENOUS at 09:37

## 2024-08-19 RX ADMIN — SODIUM CHLORIDE: 9 INJECTION, SOLUTION INTRAVENOUS at 14:49

## 2024-08-19 RX ADMIN — VANCOMYCIN HYDROCHLORIDE 750 MG: 750 INJECTION, POWDER, LYOPHILIZED, FOR SOLUTION INTRAVENOUS at 20:20

## 2024-08-19 RX ADMIN — URSODIOL 750 MG: 500 TABLET, FILM COATED ORAL at 09:38

## 2024-08-19 RX ADMIN — CEFEPIME 1000 MG: 1 INJECTION, POWDER, FOR SOLUTION INTRAMUSCULAR; INTRAVENOUS at 22:28

## 2024-08-19 RX ADMIN — LEVOTHYROXINE SODIUM 50 MCG: 0.05 TABLET ORAL at 09:37

## 2024-08-19 RX ADMIN — SODIUM CHLORIDE, PRESERVATIVE FREE 10 ML: 5 INJECTION INTRAVENOUS at 22:29

## 2024-08-19 RX ADMIN — PANTOPRAZOLE SODIUM 40 MG: 40 TABLET, DELAYED RELEASE ORAL at 20:13

## 2024-08-19 NOTE — ED PROVIDER NOTES
[facility] Emergency Department    CHIEF COMPLAINT  Foot Pain (L foot callus causing pain. Started 2-3 months, worsening pain 8-16-24 after home tx)      SHARED SERVICE VISIT  I have seen and evaluated this patient with my supervising physician, Dr. Connor Leonard.    HISTORY OF PRESENT ILLNESS  Treasure Fatima is a 79 y.o. female who presents to the ED complaining of left foot pain.  She had a callus on the bottom of her left foot for quite some time, however recently noted that there has been some redness that has developed over the top of her foot that is worsening over the past 24 hours.  Patient's daughter-in-law is bedside and states that patient used Dr. Castro's foot treatment last night when symptoms worsened.  She denies any trauma to the foot.  Denies any fevers or chills at home.  Is not a diabetic.  Denies any injuries to her legs or any other chronic medical conditions. Denies any headache, body ache, fevers or chills.  Denies any coughing or sneezing.  Denies any sore throat or congestion.  Denies any vision changes or dizziness.  Denies any chest pain, shortness of breath, or dyspnea on exertion.  Denies any nausea, vomiting, or abdominal pain.  Denies any urinary symptoms.  Denies any diarrhea or bloody stools.  Denies any new onset back pain.  Denies any recent travel or sick contacts.    No other complaints, modifying factors or associated symptoms.     Nursing notes reviewed.   Past Medical History:   Diagnosis Date    Acid reflux     Arthritis     COPD (chronic obstructive pulmonary disease) (HCC)     Pneumonia     Primary biliary cirrhosis (HCC)     Thyroid disease     hypothyroidism     Past Surgical History:   Procedure Laterality Date    COLONOSCOPY N/A 3/12/2021    COLONOSCOPY DIAGNOSTIC performed by Niko Farley MD at [facility] ENDOSCOPY    HYSTERECTOMY (CERVIX STATUS UNKNOWN)      UPPER GASTROINTESTINAL ENDOSCOPY N/A 3/12/2021    EGD BAND LIGATION performed 
tenderness.   Musculoskeletal:         General: No tenderness, deformity or signs of injury. Normal range of motion.      Cervical back: Normal range of motion.   Skin:     General: Skin is warm and dry.      Capillary Refill: Capillary refill takes less than 2 seconds.      Findings: Lesion (Approximate Demeter by 3 cm area of fluctuance on the bottom of the foot that appears purulent, erythema wrapping around to the dorsum of the foot on the side of the hallux on the left) present.   Neurological:      General: No focal deficit present.      Mental Status: She is alert and oriented to person, place, and time.      Sensory: No sensory deficit.      Motor: No weakness.   Psychiatric:         Mood and Affect: Mood normal.         Behavior: Behavior normal.          EKG Interpretation      Patient seen and evaluated.  Relevant records reviewed.  MDM    Plain film radiograph concerning for possible lucency and development of osteomyelitis.  Patient's physical examination consistent with cellulitis and the area of fluctuance appears are superficial which was drained and a wound culture was sent.  No leukocytosis or elevation in lactic acid.  Due to the concern over osteomyelitis the patient service was consulted for admission.  The patient was started on antibiotics and is to calmly admitted to the hospital.     This includes multiple reevaluations, vital sign monitoring, pulse oximetry monitoring, telemetry monitoring, clinical response to the IV medications, reviewing the nursing notes, consultation time, dictation/documentation time, and interpretation of the labwork. (This time excludes time spent performing procedures).    XR FOOT LEFT (MIN 3 VIEWS)   Final Result   1. No acute fracture or traumatic malalignment involving the left foot   2. Diffuse osteopenia   3. Soft tissue swelling adjacent to the head of the 5th metatarsal, with a   questionable area of lucency within the head of the metatarsal.  If

## 2024-08-19 NOTE — PLAN OF CARE
Problem: Pain  Goal: Verbalizes/displays adequate comfort level or baseline comfort level  Outcome: Progressing  Flowsheets (Taken 8/18/2024 1614 by Gretta Boogie, RN)  Verbalizes/displays adequate comfort level or baseline comfort level:   Encourage patient to monitor pain and request assistance   Assess pain using appropriate pain scale     Problem: Safety - Adult  Goal: Free from fall injury  Outcome: Progressing  Flowsheets (Taken 8/18/2024 1614 by Gretta Boogie, RN)  Free From Fall Injury: Instruct family/caregiver on patient safety     Problem: ABCDS Injury Assessment  Goal: Absence of physical injury  Outcome: Progressing  Flowsheets (Taken 8/19/2024 1426)  Absence of Physical Injury: Implement safety measures based on patient assessment

## 2024-08-19 NOTE — CARE COORDINATION
Chart reviewed. Care managed by podiatry and IM. MRI completed. Therapy pending, heel weight bearing only. Continuing IVATBX. From home with Gd[facility], IPTA uses no DME at baseline. Martha Harvey RN

## 2024-08-19 NOTE — DISCHARGE INSTRUCTIONS
Podiatry Discharge Instructions:  - Only put weight on your left heel  - Follow up in the office with Dr. Damien Goldstein within a week of discharge from the hospital [phone]

## 2024-08-20 VITALS
RESPIRATION RATE: 16 BRPM | TEMPERATURE: 97.6 F | BODY MASS INDEX: 15.23 KG/M2 | HEIGHT: 64 IN | WEIGHT: 89.2 LBS | OXYGEN SATURATION: 96 % | DIASTOLIC BLOOD PRESSURE: 67 MMHG | SYSTOLIC BLOOD PRESSURE: 125 MMHG | HEART RATE: 79 BPM

## 2024-08-20 LAB
ALBUMIN SERPL-MCNC: 2.5 G/DL (ref 3.4–5)
ALBUMIN/GLOB SERPL: 0.6 {RATIO} (ref 1.1–2.2)
ALP SERPL-CCNC: 157 U/L (ref 40–129)
ALT SERPL-CCNC: 13 U/L (ref 10–40)
ANION GAP SERPL CALCULATED.3IONS-SCNC: 12 MMOL/L (ref 3–16)
AST SERPL-CCNC: 25 U/L (ref 15–37)
BASOPHILS # BLD: 0.1 K/UL (ref 0–0.2)
BASOPHILS NFR BLD: 1.2 %
BILIRUB SERPL-MCNC: 0.4 MG/DL (ref 0–1)
BUN SERPL-MCNC: 20 MG/DL (ref 7–20)
CALCIUM SERPL-MCNC: 8.6 MG/DL (ref 8.3–10.6)
CHLORIDE SERPL-SCNC: 110 MMOL/L (ref 99–110)
CO2 SERPL-SCNC: 16 MMOL/L (ref 21–32)
CREAT SERPL-MCNC: 1.2 MG/DL (ref 0.6–1.2)
DEPRECATED RDW RBC AUTO: 14.8 % (ref 12.4–15.4)
EOSINOPHIL # BLD: 0.8 K/UL (ref 0–0.6)
EOSINOPHIL NFR BLD: 15 %
GFR SERPLBLD CREATININE-BSD FMLA CKD-EPI: 46 ML/MIN/{1.73_M2}
GLUCOSE SERPL-MCNC: 192 MG/DL (ref 70–99)
HCT VFR BLD AUTO: 31.6 % (ref 36–48)
HGB BLD-MCNC: 10.4 G/DL (ref 12–16)
LYMPHOCYTES # BLD: 0.7 K/UL (ref 1–5.1)
LYMPHOCYTES NFR BLD: 12.2 %
MCH RBC QN AUTO: 30 PG (ref 26–34)
MCHC RBC AUTO-ENTMCNC: 32.9 G/DL (ref 31–36)
MCV RBC AUTO: 91.2 FL (ref 80–100)
MONOCYTES # BLD: 0.4 K/UL (ref 0–1.3)
MONOCYTES NFR BLD: 7.7 %
NEUTROPHILS # BLD: 3.4 K/UL (ref 1.7–7.7)
NEUTROPHILS NFR BLD: 63.9 %
PLATELET # BLD AUTO: 101 K/UL (ref 135–450)
PMV BLD AUTO: 8.3 FL (ref 5–10.5)
POTASSIUM SERPL-SCNC: 4.2 MMOL/L (ref 3.5–5.1)
PROT SERPL-MCNC: 6.7 G/DL (ref 6.4–8.2)
RBC # BLD AUTO: 3.47 M/UL (ref 4–5.2)
SODIUM SERPL-SCNC: 138 MMOL/L (ref 136–145)
WBC # BLD AUTO: 5.4 K/UL (ref 4–11)

## 2024-08-20 PROCEDURE — 85025 COMPLETE CBC W/AUTO DIFF WBC: CPT

## 2024-08-20 PROCEDURE — 80053 COMPREHEN METABOLIC PANEL: CPT

## 2024-08-20 PROCEDURE — 36415 COLL VENOUS BLD VENIPUNCTURE: CPT

## 2024-08-20 PROCEDURE — 6370000000 HC RX 637 (ALT 250 FOR IP): Performed by: NURSE PRACTITIONER

## 2024-08-20 PROCEDURE — 2580000003 HC RX 258: Performed by: NURSE PRACTITIONER

## 2024-08-20 RX ORDER — DOXYCYCLINE HYCLATE 100 MG
100 TABLET ORAL 2 TIMES DAILY
Qty: 10 TABLET | Refills: 0 | Status: SHIPPED | OUTPATIENT
Start: 2024-08-20 | End: 2024-08-25

## 2024-08-20 RX ADMIN — SODIUM CHLORIDE, PRESERVATIVE FREE 10 ML: 5 INJECTION INTRAVENOUS at 12:05

## 2024-08-20 RX ADMIN — LEVOTHYROXINE SODIUM 50 MCG: 0.05 TABLET ORAL at 10:20

## 2024-08-20 RX ADMIN — PANTOPRAZOLE SODIUM 40 MG: 40 TABLET, DELAYED RELEASE ORAL at 06:20

## 2024-08-20 RX ADMIN — URSODIOL 750 MG: 500 TABLET, FILM COATED ORAL at 12:04

## 2024-08-20 ASSESSMENT — PAIN SCALES - GENERAL
PAINLEVEL_OUTOF10: 0

## 2024-08-20 NOTE — DISCHARGE SUMMARY
infection  - XR showed possible OM, MRI revealed abscess but no OM, podiatry reviewed the MRI and believe the \"abscess\" was just the wound cavity   - Blood cultures are NGTD  - Podiatry consulted: debridement completed on 8/18  - D/c home with doxycycline   - Patient is to follow-up with podiatry in 1 week      Normocytic Anemia   - Etiology clinically unable to determine, w/out evidence of active bleeding/hemolysis.   - Asymptomatic     HypoThyroid   - Clinically euthyroid on oral replacement therapy. .      COPD   - Does not appear to be an acute exacerbation at this time   - w/out chronic hypoxic respiratory failure on no baseline home O2.    - Controlled on home medication regimen - continued.      Primary Biliary Cirrhosis   - Continue ursodiol     Physical Exam Performed:      /67   Pulse 79   Temp 97.6 °F (36.4 °C) (Oral)   Resp 16   Ht 1.626 m (5' 4\")   Wt 40.5 kg (89 lb 3.2 oz)   SpO2 96%   BMI 15.31 kg/m²       General appearance:  No acute distress, appears stated age and cooperative.  HEENT:  Normal cephalic, atraumatic without obvious deformity. Conjunctivae/corneas clear.  Neck: No jugular venous distention.    Respiratory:  Normal respiratory effort. Clear to auscultation, bilaterally without Rales/Wheezes/Rhonchi.  Cardiovascular:  Regular rate and rhythm without murmurs, rubs or gallops.  Abdomen: Soft, non-tender, non-distended with normal bowel sounds.  Musculoskeletal:  No clubbing, cyanosis or edema bilaterally.    Skin: Skin color, texture, turgor normal.  No rashes or lesions.  Neurologic:  Neurovascularly intact without any focal sensory/motor deficits.   Psychiatric:  Alert and oriented, thought content appropriate, normal insight      Patient Discharge Instructions:      Follow up:    1.  Primary Care Provider Keisha Tsang MD in the next 1-2 weeks.      The patient was seen and examined on day of discharge and this discharge summary is in conjunction with any daily progress

## 2024-08-20 NOTE — DISCHARGE INSTR - COC
Continuity of Care Form    Patient Name: Treasure Fatima   :  1945  MRN:  [id number]    Admit date:  2024  Discharge date:  2024    Code Status Order: Full Code   Advance Directives:   Advance Care Flowsheet Documentation             Admitting Physician:  No admitting provider for patient encounter.  PCP: Keisha Tsang MD    Discharging Nurse: Mary Bowser Rn  Discharging Hospital Unit/Room#: 0349/0349-01  Discharging Unit Phone Number: [phone]    Emergency Contact:   Extended Emergency Contact Information  Primary Emergency Contact: ABHIJEET LACY  Mobile Phone: [phone]  Relation: Daughter-in-Law  Secondary Emergency Contact: ERNESTINA REYNA  Mobile Phone: [phone]  Relation: None    Past Surgical History:  Past Surgical History:   Procedure Laterality Date    COLONOSCOPY N/A 3/12/2021    COLONOSCOPY DIAGNOSTIC performed by Niko Farley MD at [facility] ENDOSCOPY    HYSTERECTOMY (CERVIX STATUS UNKNOWN)      UPPER GASTROINTESTINAL ENDOSCOPY N/A 3/12/2021    EGD BAND LIGATION performed by Niko Farley MD at [facility] ENDOSCOPY    UPPER GASTROINTESTINAL ENDOSCOPY N/A 2021    EGD BAND LIGATION performed by Violet Kelly MD at [facility] ENDOSCOPY    UPPER GASTROINTESTINAL ENDOSCOPY N/A 2021    EGD performed by Violet Kelly MD at [facility] ENDOSCOPY    UPPER GASTROINTESTINAL ENDOSCOPY  2021    EGD BAND LIGATION performed by Violet Kelly MD at [facility] ENDOSCOPY    UPPER GASTROINTESTINAL ENDOSCOPY N/A 2022    EGD DIAGNOSTIC ONLY performed by Violet Kelly MD at [facility] ENDOSCOPY    UPPER GASTROINTESTINAL ENDOSCOPY  2022    EGD FOREIGN BODY REMOVAL performed by Violet Kelly MD at [facility] ENDOSCOPY    UPPER GASTROINTESTINAL ENDOSCOPY N/A 2023    EGD performed by Gian Gonsales MD at [facility] ENDOSCOPY    UPPER GASTROINTESTINAL ENDOSCOPY  2023    EGD BAND LIGATION performed by Gian Gonsales MD at [facility] ENDOSCOPY    UPPER GASTROINTESTINAL

## 2024-08-20 NOTE — PROGRESS NOTES
Hospital Medicine Progress Note      Date of Admission: 8/17/2024  Hospital Day: 2    Chief Admission Complaint:  foot redness     Subjective: Patient is in hospital bed awake and alert.  No new issues or complaints today.  Patient states she feels \"pretty good \".  Denies any foot pain at this time.  Denies any shortness of breath.    Presenting Admission History:       Treasure Fatima is a/an 79 y.o. female with a significant past medical history of primary biliary cirrhosis, hypothyroidism, COPD, GERD, and OA who presents to [facility] ED with complaint of a red area on the lateral aspect of her left foot. She notes she's had a calloused area in the particular area for \"month\" with no symptoms pain, or redness, but attempted to self-treat with Dr. Castro's callous removal recently. She notes it seemed to make the area irritated, and became reddened and slightly tender yesterday. She denies any worsening symptoms such as fever, rigors, chills. However, given the redness, she presented here for further evaluation. Her evaluation here included laboratory studies and left foot x-rays. Left foot XR showed soft tissue swelling along the head of the 5th metatarsal with questionable area of lucency possibly concerning for osteomyelitis. Laboratory studies were reviewed and pertinent for sodium 133, GFR 46, blood glucose 169, , AST 49, normal WBC, hemoglobin 11.4, and platelets 105. An incision & drainage was performed in the ED, purulent drainage was noted by provider; cultures were collected. Vancomycin and Zosyn were started in ED. Hospital team was consulted to admit.     Assessment/Plan:      Current Principal Problem:  Cellulitis of fifth toe of left foot    Cellulitis, Left Foot   -Etiology likely due to infection  -XR showed possible OM  -Started on vancomycin and cefepime on 8/17/2024  -Blood & Wound cultures pending  -Podiatry consulted  -MRI pending  -Pain control as needed    Normocytic Anemia - etiology 
  Hospital Medicine Progress Note      Date of Admission: 8/17/2024  Hospital Day: 3    Chief Admission Complaint:  foot redness     Subjective: Patient was seen and examined this morning. She denies any acute complaints. She states that overall she feels well.     Presenting Admission History:       Treasure Fatima is a/an 79 y.o. female with a significant past medical history of primary biliary cirrhosis, hypothyroidism, COPD, GERD, and OA who presents to [facility] ED with complaint of a red area on the lateral aspect of her left foot. She notes she's had a calloused area in the particular area for \"month\" with no symptoms pain, or redness, but attempted to self-treat with Dr. Castro's callous removal recently. She notes it seemed to make the area irritated, and became reddened and slightly tender yesterday. She denies any worsening symptoms such as fever, rigors, chills. However, given the redness, she presented here for further evaluation. Her evaluation here included laboratory studies and left foot x-rays. Left foot XR showed soft tissue swelling along the head of the 5th metatarsal with questionable area of lucency possibly concerning for osteomyelitis. Laboratory studies were reviewed and pertinent for sodium 133, GFR 46, blood glucose 169, , AST 49, normal WBC, hemoglobin 11.4, and platelets 105. An incision & drainage was performed in the ED, purulent drainage was noted by provider; cultures were collected. Vancomycin and Zosyn were started in ED. Hospital team was consulted to admit.     Assessment/Plan:      Current Principal Problem:  Cellulitis of fifth toe of left foot    Cellulitis, Left Foot   - Etiology likely due to infection  - XR showed possible OM, MRI revealed abscess but no OM  - Continue vancomycin and cefepime   - Blood cultures are NGTD  - Podiatry consulted: debridement completed on 8/18    Normocytic Anemia   - Etiology clinically unable to determine, w/out evidence of active 
4 Eyes Skin Assessment     The patient is being assess for  Admission    I agree that 2 RN's have performed a thorough Head to Toe Skin Assessment on the patient. ALL assessment sites listed below have been assessed.       Areas assessed by both nurses:   [x]   Head, Face, and Ears   [x]   Shoulders, Back, and Chest  [x]   Arms, Elbows, and Hands   [x]   Coccyx, Sacrum, and Ischum  [x]   Legs, Feet, and Heels        Does the Patient have Skin Breakdown?  No - blanching redness on coccyx        Carlos Prevention initiated:  Yes   Wound Care Orders initiated:  NA      [facility] nurse consulted for Pressure Injury (Stage 3,4, Unstageable, DTI, NWPT, and Complex wounds):  NA      Nurse 1 eSignature: Electronically signed by Ann Mcadams RN on 8/17/24 at 9:25 PM EDT    Nurse 2 eSignature: Electronically signed by GONZÁLEZ BACH RN on 8/17/24 at 9:27 PM EDT             
Discharge instructions given to patient.Mary Bowser RN   
Occupational Therapy  Facility/Department: [facility] C3 TELE/MED SURG/ONC  Occupational Therapy Initial Assessment/ Treatment Note    Name: Treasure Fatima  : 1945  MRN: [id number]  Date of Service: 2024    Discharge Recommendations:  Home with Home health OT, 24 hour supervision or assist  OT Equipment Recommendations  Equipment Needed: No     Patient Diagnosis(es): The encounter diagnosis was Osteomyelitis of left foot, unspecified type (HCC).  Past Medical History:  has a past medical history of Acid reflux, Arthritis, COPD (chronic obstructive pulmonary disease) (HCC), Pneumonia, Primary biliary cirrhosis (HCC), and Thyroid disease.  Past Surgical History:  has a past surgical history that includes Hysterectomy; Colonoscopy (N/A, 3/12/2021); Upper gastrointestinal endoscopy (N/A, 3/12/2021); Upper gastrointestinal endoscopy (N/A, 2021); Upper gastrointestinal endoscopy (N/A, 2021); Upper gastrointestinal endoscopy (2021); Upper gastrointestinal endoscopy (N/A, 2022); Upper gastrointestinal endoscopy (2022); Upper gastrointestinal endoscopy (N/A, 2023); Upper gastrointestinal endoscopy (2023); and Upper gastrointestinal endoscopy (N/A, 8/10/2023).    Assessment   Performance deficits / Impairments: Decreased functional mobility ;Decreased safe awareness;Decreased balance;Decreased ADL status;Decreased endurance;Decreased strength  Assessment: 80 y/o female presents to [facility] c cellulitis of L fifth toe. Pt now LLE heel weight bearing per MD note 24. Pt lives with granddaughter in 2-story home (pt lives on main floor) w/ 1 NAHID to enter and IPTA. Pt currently completing bed mobility MOD IND, SBA functional transfers/ mobility c RW, and SBA for LB/ UB ADLs. Pt required vc's t/o session for AD mgmt and safety for fall prevention. Pt functioning slightly below baseline and would benefit from skilled OT services to improve functional IND and safety. OT rec d/c home  
Podiatry Progress Note    Subjective:   Patient seen at bedside this evening.  No new pedal complaints.  Her left foot pain has improved significantly.  She denies n/v/f/c and SOB.      Objective:   Vitals:  BP (!) 95/59   Pulse 82   Temp 97.4 °F (36.3 °C) (Oral)   Resp 16   Ht 1.626 m (5' 4\")   Wt 40.5 kg (89 lb 3.2 oz)   SpO2 94%   BMI 15.31 kg/m²   Integument:  Area of hyperkeratosis sub fifth metatarsal head on the left.  After debriding the callus, there was a full thickness wound.  It measured 0.4cm x 0.4cm x 0.5cm.  The base was granular.  It did probe deep, almost to bone.  There was no purulence today.  There was no oleksandr-wound erythema today.  No proximal red streaking. Otherwise, skin was thin, dry and atrophic, bilateral.   Neurologic:  Protective sensation is grossly intact to light touch at the level of the toes, bilateral.  Vascular:  DP and PT pulses are palpable, bilateral.  CFT is brisk to all toes.  No significant edema appreciated.  Musculoskeletal:  Patient has 5/5 strength on inversion/everison/dorsiflexion/plantarflexion, bilateral.  No gross musculoskeletal deformities noted.  Slight pain on palpation sub fifth metatarsal head on the left.    Labs:   CBC with Differential:    Lab Results   Component Value Date/Time    WBC 5.2 08/19/2024 05:13 AM    RBC 3.34 08/19/2024 05:13 AM    HGB 10.1 08/19/2024 05:13 AM    HCT 30.7 08/19/2024 05:13 AM    PLT 99 08/19/2024 05:13 AM    MCV 91.7 08/19/2024 05:13 AM    MCH 30.3 08/19/2024 05:13 AM    MCHC 33.1 08/19/2024 05:13 AM    RDW 14.6 08/19/2024 05:13 AM    LYMPHOPCT 13.2 08/19/2024 05:13 AM    MONOPCT 6.8 08/19/2024 05:13 AM    EOSPCT 15.0 08/19/2024 05:13 AM    BASOPCT 0.9 08/19/2024 05:13 AM    MONOSABS 0.4 08/19/2024 05:13 AM    LYMPHSABS 0.7 08/19/2024 05:13 AM    EOSABS 0.8 08/19/2024 05:13 AM    BASOSABS 0.0 08/19/2024 05:13 AM    DIFFTYPE Auto-K 01/17/2011 05:45 PM     CMP:    Lab Results   Component Value Date/Time     08/19/2024 
Pt AOx4, VSS, shift assessment completed and charted. Pt denies pain in LLE, foot and bottom of 5th toe wrapped after lancing in ER. Elevated PRN. Scattered bruises and blanching on sacrum noted. Pt ambulating independently per baseline. Pt denying further needs, and was in stable condition when this RN left the room. Bed is low, locked, and call light/bedside table within reach. All care per orders. Electronically signed by Ann Mcadams RN on 8/18/2024 at 1:59 AM    Home eye drops and patient supplied ACTIGALL in lockbox. Upper plate dentures and reading glasses in closet. Electronically signed by Ann Mcadams RN on 8/18/2024 at 1:59 AM      
Pt AOx4, VSS, shift assessment completed and charted. Pt denying pain, LLE wrapped by podiatry after bedside I+D. Pt ambulating SBA, c/o appropriately. Pt denying further needs, and was in stable condition when this RN left the room. Bed is low, locked, alarmed, and call light/bedside table within reach. All care per orders. Electronically signed by Ann Mcadams RN on 8/19/2024 at 2:35 AM      
Pt alert and orientated. Call light within reach. No complaints at this time. Enc pt to call for assistance.Mary Bowser RN   
Pt verbalized understanding of discharge instructions and had no questions or concerns.   
Shift assessment completed. Pt A&O x4.  VSS.  Denies Pain. IV site patent/ flushed, and saline locked, for MRI.  Patient on RA.  Patient admits to passing gas. Patient ambulates by with SBA to bathroom.  Medication given per MAR. Denies any needs at this time. Patient calls appropriately for needs. Bed locked and in lowest position.  Call light within reach. Gripper socks applied. Patient in stable condition when RN leaving room.   Electronically signed by Gretta Boogie RN on 8/18/2024 at 4:15 PM    
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  Vision/Hearing  Vision  Vision: Impaired  Vision Exceptions: Wears glasses for reading  Hearing  Hearing: Within functional limits    Cognition   Orientation  Overall Orientation Status: Within Normal Limits  Orientation Level: Oriented X4  Cognition  Overall Cognitive Status: WFL     Objective   Temp: 97.7 °F (36.5 °C)  Pulse: 74  Heart Rate Source: Monitor  Respirations: 16  SpO2: 96 %  O2 Device: None (Room air)  BP: 114/64  MAP (Calculated): 81  BP Location: Right upper arm  BP Method: Automatic  Patient Position: Semi fowlers     Observation/Palpation  Posture: Good  Gross Assessment  AROM: Within functional limits  PROM: Within functional limits  Strength: Generally decreased, functional  Coordination: Within functional limits  Tone: Normal  Sensation: Impaired (N/T in lower legs/feet)                 Bed Mobility Training  Bed Mobility Training: Yes  Supine to Sit: Modified independent (HOB elevated)  Scooting: Supervision  Balance  Sitting: Intact  Standing: With support;High guard  Transfer Training  Transfer Training: Yes  Interventions: Safety awareness training;Verbal cues  Sit to Stand: Stand-by assistance;Adaptive equipment  Stand to Sit: Stand-by assistance;Adaptive equipment  Toilet Transfer: Stand-by assistance;Adaptive equipment  Gait Training  Right Side Weight

## 2024-08-20 NOTE — CARE COORDINATION
Chart reviewed day 3. Care managed by podiatry and IM. Likely d/c today. Therapy with recs for HHC and RW. [facility] accepted. Per podiatry, will transition to po atbx at d/c. Martha Harvey RN

## 2024-08-20 NOTE — CARE COORDINATION
CASE MANAGEMENT DISCHARGE SUMMARY      Discharge to: home with [facility] SN/PT/OT      IMM given: 8/19/24    New Durable Medical Equipment ordered/agency: none has RW    Transportation: private     Confirmed discharge plan with:     Patient: yes     Family:  yes she will call when ready to go     Facility/Agency, name:  [facility] faxedpulled per Dinora MALCOLM RN, name: Mary Harvey, RN

## 2024-08-20 NOTE — PLAN OF CARE
Problem: Pain  Goal: Verbalizes/displays adequate comfort level or baseline comfort level  8/20/2024 1442 by Mary Bowser RN  Outcome: Completed  8/20/2024 1207 by Mary Bowser RN  Outcome: Progressing     Problem: Safety - Adult  Goal: Free from fall injury  8/20/2024 1442 by Mary Bowser RN  Outcome: Completed  8/20/2024 1207 by Mary Bowser RN  Outcome: Progressing     Problem: ABCDS Injury Assessment  Goal: Absence of physical injury  8/20/2024 1442 by Mary Bowser RN  Outcome: Completed  8/20/2024 1207 by Mary Bowser RN  Outcome: Progressing

## 2024-08-21 LAB
BACTERIA BLD CULT ORG #2: NORMAL
BACTERIA BLD CULT: NORMAL

## 2024-09-17 ENCOUNTER — APPOINTMENT (OUTPATIENT)
Dept: GENERAL RADIOLOGY | Age: 79
DRG: 579 | End: 2024-09-17
Payer: MEDICARE

## 2024-09-17 ENCOUNTER — HOSPITAL ENCOUNTER (INPATIENT)
Age: 79
LOS: 5 days | Discharge: HOME | DRG: 579 | End: 2024-09-22
Attending: EMERGENCY MEDICINE | Admitting: INTERNAL MEDICINE
Payer: MEDICARE

## 2024-09-17 DIAGNOSIS — L03.116 CELLULITIS OF LEFT FOOT: Primary | ICD-10-CM

## 2024-09-17 DIAGNOSIS — L02.612 ABSCESS OF LEFT FOOT: ICD-10-CM

## 2024-09-17 PROBLEM — L03.90 CELLULITIS: Status: ACTIVE | Noted: 2024-09-17

## 2024-09-17 LAB
ALBUMIN SERPL-MCNC: 3.2 G/DL (ref 3.4–5)
ALBUMIN/GLOB SERPL: 0.7 {RATIO} (ref 1.1–2.2)
ALP SERPL-CCNC: 184 U/L (ref 40–129)
ALT SERPL-CCNC: 15 U/L (ref 10–40)
ANION GAP SERPL CALCULATED.3IONS-SCNC: 11 MMOL/L (ref 3–16)
AST SERPL-CCNC: 30 U/L (ref 15–37)
BASOPHILS # BLD: 0 K/UL (ref 0–0.2)
BASOPHILS NFR BLD: 0.3 %
BILIRUB SERPL-MCNC: 0.7 MG/DL (ref 0–1)
BUN SERPL-MCNC: 25 MG/DL (ref 7–20)
CALCIUM SERPL-MCNC: 9.6 MG/DL (ref 8.3–10.6)
CHLORIDE SERPL-SCNC: 101 MMOL/L (ref 99–110)
CO2 SERPL-SCNC: 21 MMOL/L (ref 21–32)
CREAT SERPL-MCNC: 1.2 MG/DL (ref 0.6–1.2)
CRP SERPL-MCNC: 17.7 MG/L (ref 0–5.1)
DEPRECATED RDW RBC AUTO: 14.3 % (ref 12.4–15.4)
EOSINOPHIL # BLD: 0.5 K/UL (ref 0–0.6)
EOSINOPHIL NFR BLD: 7.3 %
ERYTHROCYTE [SEDIMENTATION RATE] IN BLOOD BY WESTERGREN METHOD: 81 MM/HR (ref 0–30)
GFR SERPLBLD CREATININE-BSD FMLA CKD-EPI: 46 ML/MIN/{1.73_M2}
GLUCOSE SERPL-MCNC: 294 MG/DL (ref 70–99)
HCT VFR BLD AUTO: 37.1 % (ref 36–48)
HGB BLD-MCNC: 12.2 G/DL (ref 12–16)
LACTATE BLDV-SCNC: 2.3 MMOL/L (ref 0.4–2)
LYMPHOCYTES # BLD: 0.9 K/UL (ref 1–5.1)
LYMPHOCYTES NFR BLD: 11.6 %
MCH RBC QN AUTO: 30.2 PG (ref 26–34)
MCHC RBC AUTO-ENTMCNC: 32.9 G/DL (ref 31–36)
MCV RBC AUTO: 92 FL (ref 80–100)
MONOCYTES # BLD: 0.4 K/UL (ref 0–1.3)
MONOCYTES NFR BLD: 5.2 %
NEUTROPHILS # BLD: 5.7 K/UL (ref 1.7–7.7)
NEUTROPHILS NFR BLD: 75.6 %
PLATELET # BLD AUTO: 132 K/UL (ref 135–450)
PMV BLD AUTO: 8.1 FL (ref 5–10.5)
POTASSIUM SERPL-SCNC: 4.7 MMOL/L (ref 3.5–5.1)
PROT SERPL-MCNC: 8.1 G/DL (ref 6.4–8.2)
RBC # BLD AUTO: 4.03 M/UL (ref 4–5.2)
SODIUM SERPL-SCNC: 133 MMOL/L (ref 136–145)
WBC # BLD AUTO: 7.5 K/UL (ref 4–11)

## 2024-09-17 PROCEDURE — 80053 COMPREHEN METABOLIC PANEL: CPT

## 2024-09-17 PROCEDURE — 10060 I&D ABSCESS SIMPLE/SINGLE: CPT

## 2024-09-17 PROCEDURE — 87075 CULTR BACTERIA EXCEPT BLOOD: CPT

## 2024-09-17 PROCEDURE — 86140 C-REACTIVE PROTEIN: CPT

## 2024-09-17 PROCEDURE — 99285 EMERGENCY DEPT VISIT HI MDM: CPT

## 2024-09-17 PROCEDURE — 6370000000 HC RX 637 (ALT 250 FOR IP): Performed by: INTERNAL MEDICINE

## 2024-09-17 PROCEDURE — 6360000002 HC RX W HCPCS: Performed by: INTERNAL MEDICINE

## 2024-09-17 PROCEDURE — 85025 COMPLETE CBC W/AUTO DIFF WBC: CPT

## 2024-09-17 PROCEDURE — 2580000003 HC RX 258: Performed by: INTERNAL MEDICINE

## 2024-09-17 PROCEDURE — 87077 CULTURE AEROBIC IDENTIFY: CPT

## 2024-09-17 PROCEDURE — 73630 X-RAY EXAM OF FOOT: CPT

## 2024-09-17 PROCEDURE — 94640 AIRWAY INHALATION TREATMENT: CPT

## 2024-09-17 PROCEDURE — 87070 CULTURE OTHR SPECIMN AEROBIC: CPT

## 2024-09-17 PROCEDURE — 87186 SC STD MICRODIL/AGAR DIL: CPT

## 2024-09-17 PROCEDURE — 85652 RBC SED RATE AUTOMATED: CPT

## 2024-09-17 PROCEDURE — 1200000000 HC SEMI PRIVATE

## 2024-09-17 PROCEDURE — 87040 BLOOD CULTURE FOR BACTERIA: CPT

## 2024-09-17 PROCEDURE — 36415 COLL VENOUS BLD VENIPUNCTURE: CPT

## 2024-09-17 PROCEDURE — 86403 PARTICLE AGGLUT ANTBDY SCRN: CPT

## 2024-09-17 PROCEDURE — 87205 SMEAR GRAM STAIN: CPT

## 2024-09-17 PROCEDURE — 83605 ASSAY OF LACTIC ACID: CPT

## 2024-09-17 RX ORDER — LEVOTHYROXINE SODIUM 50 UG/1
50 TABLET ORAL DAILY
Status: DISCONTINUED | OUTPATIENT
Start: 2024-09-18 | End: 2024-09-22 | Stop reason: HOSPADM

## 2024-09-17 RX ORDER — ONDANSETRON 2 MG/ML
4 INJECTION INTRAMUSCULAR; INTRAVENOUS EVERY 6 HOURS PRN
Status: DISCONTINUED | OUTPATIENT
Start: 2024-09-17 | End: 2024-09-22 | Stop reason: HOSPADM

## 2024-09-17 RX ORDER — PREDNISOLONE ACETATE 10 MG/ML
2 SUSPENSION/ DROPS OPHTHALMIC 4 TIMES DAILY
Status: DISCONTINUED | OUTPATIENT
Start: 2024-09-17 | End: 2024-09-22 | Stop reason: HOSPADM

## 2024-09-17 RX ORDER — SODIUM CHLORIDE 9 MG/ML
INJECTION, SOLUTION INTRAVENOUS PRN
Status: DISCONTINUED | OUTPATIENT
Start: 2024-09-17 | End: 2024-09-22 | Stop reason: HOSPADM

## 2024-09-17 RX ORDER — HEPARIN SODIUM 5000 [USP'U]/ML
5000 INJECTION, SOLUTION INTRAVENOUS; SUBCUTANEOUS 2 TIMES DAILY
Status: DISCONTINUED | OUTPATIENT
Start: 2024-09-17 | End: 2024-09-22 | Stop reason: HOSPADM

## 2024-09-17 RX ORDER — ACETAMINOPHEN 650 MG/1
650 SUPPOSITORY RECTAL EVERY 6 HOURS PRN
Status: DISCONTINUED | OUTPATIENT
Start: 2024-09-17 | End: 2024-09-22 | Stop reason: HOSPADM

## 2024-09-17 RX ORDER — ALBUTEROL SULFATE 90 UG/1
2 INHALANT RESPIRATORY (INHALATION) EVERY 4 HOURS PRN
Status: DISCONTINUED | OUTPATIENT
Start: 2024-09-17 | End: 2024-09-22 | Stop reason: HOSPADM

## 2024-09-17 RX ORDER — SODIUM CHLORIDE 0.9 % (FLUSH) 0.9 %
5-40 SYRINGE (ML) INJECTION PRN
Status: DISCONTINUED | OUTPATIENT
Start: 2024-09-17 | End: 2024-09-22 | Stop reason: HOSPADM

## 2024-09-17 RX ORDER — BUDESONIDE AND FORMOTEROL FUMARATE DIHYDRATE 160; 4.5 UG/1; UG/1
2 AEROSOL RESPIRATORY (INHALATION)
Status: DISCONTINUED | OUTPATIENT
Start: 2024-09-17 | End: 2024-09-22 | Stop reason: HOSPADM

## 2024-09-17 RX ORDER — URSODIOL 500 MG/1
750 TABLET, FILM COATED ORAL DAILY
Status: DISCONTINUED | OUTPATIENT
Start: 2024-09-18 | End: 2024-09-22 | Stop reason: HOSPADM

## 2024-09-17 RX ORDER — ACETAMINOPHEN 325 MG/1
650 TABLET ORAL EVERY 6 HOURS PRN
Status: DISCONTINUED | OUTPATIENT
Start: 2024-09-17 | End: 2024-09-22 | Stop reason: HOSPADM

## 2024-09-17 RX ORDER — SODIUM CHLORIDE 0.9 % (FLUSH) 0.9 %
5-40 SYRINGE (ML) INJECTION EVERY 12 HOURS SCHEDULED
Status: DISCONTINUED | OUTPATIENT
Start: 2024-09-17 | End: 2024-09-22 | Stop reason: HOSPADM

## 2024-09-17 RX ORDER — 0.9 % SODIUM CHLORIDE 0.9 %
1000 INTRAVENOUS SOLUTION INTRAVENOUS ONCE
Status: DISCONTINUED | OUTPATIENT
Start: 2024-09-17 | End: 2024-09-17

## 2024-09-17 RX ORDER — POLYETHYLENE GLYCOL 3350 17 G/17G
17 POWDER, FOR SOLUTION ORAL DAILY PRN
Status: DISCONTINUED | OUTPATIENT
Start: 2024-09-17 | End: 2024-09-22 | Stop reason: HOSPADM

## 2024-09-17 RX ORDER — ONDANSETRON 4 MG/1
4 TABLET, ORALLY DISINTEGRATING ORAL EVERY 8 HOURS PRN
Status: DISCONTINUED | OUTPATIENT
Start: 2024-09-17 | End: 2024-09-22 | Stop reason: HOSPADM

## 2024-09-17 RX ORDER — FLUTICASONE PROPIONATE 50 MCG
1 SPRAY, SUSPENSION (ML) NASAL DAILY
Status: DISCONTINUED | OUTPATIENT
Start: 2024-09-18 | End: 2024-09-22 | Stop reason: HOSPADM

## 2024-09-17 RX ADMIN — CEFEPIME 2000 MG: 2 INJECTION, POWDER, FOR SOLUTION INTRAVENOUS at 19:39

## 2024-09-17 RX ADMIN — Medication 2 PUFF: at 19:36

## 2024-09-17 RX ADMIN — VANCOMYCIN HYDROCHLORIDE 1000 MG: 1 INJECTION, POWDER, LYOPHILIZED, FOR SOLUTION INTRAVENOUS at 19:40

## 2024-09-17 RX ADMIN — HEPARIN SODIUM 5000 UNITS: 5000 INJECTION INTRAVENOUS; SUBCUTANEOUS at 20:26

## 2024-09-17 ASSESSMENT — PAIN - FUNCTIONAL ASSESSMENT: PAIN_FUNCTIONAL_ASSESSMENT: 0-10

## 2024-09-17 ASSESSMENT — PAIN DESCRIPTION - LOCATION
LOCATION: FOOT
LOCATION: FOOT

## 2024-09-17 ASSESSMENT — PAIN SCALES - GENERAL
PAINLEVEL_OUTOF10: 8
PAINLEVEL_OUTOF10: 8
PAINLEVEL_OUTOF10: 3

## 2024-09-17 NOTE — PROGRESS NOTES
Pharmacy Note - Renal Dosing and Extended Infusion Beta-Lactam Adjustment    Cefepime 2000mg Q12h for treatment of Skin and soft tissue infection. Per [facility] Renal Dose Adjustment Policy and Extended Infusion Beta-Lactam Policy, cefepime will be changed to 2000mg load followed by 1000mg Q24h extended infusion    Estimated Creatinine Clearance: Estimated Creatinine Clearance: 24 mL/min (based on SCr of 1.2 mg/dL).    BMI: Body mass index is 15.45 kg/m².    Please call with any questions.    Thank you,    Mya Soto, [facility]

## 2024-09-17 NOTE — CONSULTS
Pharmacy Note  Vancomycin Consult    Treasure Fatima is a 79 y.o. female started on Vancomycin for SSTI; consult received from Dr. Gian Castro to manage therapy. Also receiving the following antibiotics: Cefepime.    Allergies:  Patient has no known allergies.     Tmax: 97.5    Micro: sent    Recent Labs     09/17/24  1312   CREATININE 1.2     Recent Labs     09/17/24  1312   WBC 7.5   Estimated Creatinine Clearance: 24 mL/min (based on SCr of 1.2 mg/dL).  No intake or output data in the 24 hours ending 09/17/24 1827    Wt Readings from Last 1 Encounters:   09/17/24 40.8 kg (90 lb)       Body mass index is 15.45 kg/m².    Loading dose (critically ill or in ICU, require dialysis or renal replacement therapy): Vancomycin 25 mg/kg IVPB x 1 (maximum 2500 mg).  Maintenance dose: 10-20 mg/kg (maximum: 2000 mg/dose and 4500 mg/day) starting at the next dosing interval determined by renal function  Pulse dose: fluctuating renal function, MIRANDA, ESRD  CRRT: 7.5-10 mg/kg q12h   Goal Vancomycin trough: 15-20 mcg/mL   Goal Vancomycin AUC: 400-600     Assessment/Plan:  Will initiate Vancomycin with a one time loading dose of 1000 mg x1, followed by 750 mg IV every 24 hours. Calculated Vancomycin AUC = 566 mg/L*h with an estimated steady-state vancomycin trough = 18.3 mcg/mL. Vancomycin level ordered for 9/19 @ 0600. Timing of trough level will be determined based on culture results, renal function, and clinical response.     Thank you for the consult.  Dilip Alfonso, LanaD 9/17/2024 6:28 PM      Vancomycin Day of Therapy 2/5  Indication: SSTI  Micro: BC in process   Current Dosing Method: Bayesian-Guided AUC Dosing  Therapeutic Goal: -600 mg/L*hr  Recent Labs     09/17/24  1312 09/18/24  0543   WBC 7.5 6.0   CREATININE 1.2 1.2   Estimated Creatinine Clearance: 24 mL/min (based on SCr of 1.2 mg/dL).  Current Dose / Plan:   Continue Vancomycin 750 mg IV q24h.   Kinetics predict an AUC = 573 mg/L*h with an estimated

## 2024-09-17 NOTE — H&P
Hospital Medicine History & Physical      Date of Admission: 9/17/2024    Date of Service:  Pt seen/examined on 17 Sept 2024     [x]Admitted to Inpatient with expected LOS greater than two midnights due to medical therapy.  []Placed in Observation status.    Chief Admission Complaint:  L foot infection       Presenting Admission History:        79 y.o. female who presented to [facility] with progressive L foot swelling and pain.  Underwent local I&D in ED and placed on ABX.  She is well known to Podiatry - consulted from ED and appreciated in advance.       The patient denies any fever/chills or other signs/sxs of systemic illness or identifiable aggravating/alleviating factors\"      Assessment/Plan:      Current Principal Problem:  Cellulitis      Cellulitis - L foot, w/ small abscess s/p I&D in ED.  Podiatry consulted and appreciated. Started on Vanco/Cefepime.  Closely following and personally reviewed serial labs as documented in this note for evidence of ABX (Vanco) induced nephrotoxicity.      PBC - controlled on home Actigall - continued.  No acute issues and GI NOT YET consulted.     COPD - w/out chronic hypoxic respiratory failure on no baseline home O2.  Controlled on home medication regimen - continued.     HypoThyroid - clinically euthyroid on oral replacement therapy. Continue, w/ outpt monitoring as previously arranged.       CXR: I have reviewed the CXR with the following interpretation:   EKG:  I have reviewed the EKG with the following interpretation:     Physical Exam Performed:      BP (!) 156/72   Pulse 89   Temp 98 °F (36.7 °C) (Oral)   Resp 18   Wt 40.8 kg (90 lb)   SpO2 97%   BMI 15.45 kg/m²     General appearance:  No apparent distress, appears stated age and cooperative.  HEENT:  Pupils equal, round, and reactive to light. Conjunctivae/corneas clear.  Respiratory:  Normal respiratory effort. Clear to auscultation bilaterally without Rales/Wheezes/Rhonchi.  Cardiovascular:

## 2024-09-17 NOTE — ED PROVIDER NOTES
have an abscess in the foot and a significant cellulitis and I believe she would benefit from hospitalization for further treatment.  Abscess was drained by the physician assistant and culture was sent.       Differential diagnostic considerations: Abscess, cellulitis, sepsis, necrotizing fasciitis, osteomyelitis      I am the Primary Physician of Record.          Mani Lopez MD  09/17/24 2050

## 2024-09-18 ENCOUNTER — ANESTHESIA (OUTPATIENT)
Dept: OPERATING ROOM | Age: 79
End: 2024-09-18
Payer: MEDICARE

## 2024-09-18 ENCOUNTER — ANESTHESIA EVENT (OUTPATIENT)
Dept: OPERATING ROOM | Age: 79
End: 2024-09-18
Payer: MEDICARE

## 2024-09-18 ENCOUNTER — APPOINTMENT (OUTPATIENT)
Dept: GENERAL RADIOLOGY | Age: 79
DRG: 579 | End: 2024-09-18
Payer: MEDICARE

## 2024-09-18 PROBLEM — E43 SEVERE MALNUTRITION (HCC): Chronic | Status: ACTIVE | Noted: 2024-09-18

## 2024-09-18 LAB
ALBUMIN SERPL-MCNC: 2.7 G/DL (ref 3.4–5)
ANION GAP SERPL CALCULATED.3IONS-SCNC: 9 MMOL/L (ref 3–16)
BUN SERPL-MCNC: 25 MG/DL (ref 7–20)
CALCIUM SERPL-MCNC: 8.9 MG/DL (ref 8.3–10.6)
CHLORIDE SERPL-SCNC: 109 MMOL/L (ref 99–110)
CO2 SERPL-SCNC: 19 MMOL/L (ref 21–32)
CREAT SERPL-MCNC: 1.2 MG/DL (ref 0.6–1.2)
DEPRECATED RDW RBC AUTO: 13.8 % (ref 12.4–15.4)
GFR SERPLBLD CREATININE-BSD FMLA CKD-EPI: 46 ML/MIN/{1.73_M2}
GLUCOSE SERPL-MCNC: 121 MG/DL (ref 70–99)
HCT VFR BLD AUTO: 30.4 % (ref 36–48)
HGB BLD-MCNC: 10.1 G/DL (ref 12–16)
MAGNESIUM SERPL-MCNC: 1.9 MG/DL (ref 1.8–2.4)
MCH RBC QN AUTO: 30.1 PG (ref 26–34)
MCHC RBC AUTO-ENTMCNC: 33.2 G/DL (ref 31–36)
MCV RBC AUTO: 90.6 FL (ref 80–100)
PHOSPHATE SERPL-MCNC: 3.5 MG/DL (ref 2.5–4.9)
PLATELET # BLD AUTO: 119 K/UL (ref 135–450)
PMV BLD AUTO: 8.1 FL (ref 5–10.5)
POTASSIUM SERPL-SCNC: 4.2 MMOL/L (ref 3.5–5.1)
RBC # BLD AUTO: 3.36 M/UL (ref 4–5.2)
SODIUM SERPL-SCNC: 137 MMOL/L (ref 136–145)
WBC # BLD AUTO: 6 K/UL (ref 4–11)

## 2024-09-18 PROCEDURE — 2500000003 HC RX 250 WO HCPCS: Performed by: NURSE ANESTHETIST, CERTIFIED REGISTERED

## 2024-09-18 PROCEDURE — 3700000000 HC ANESTHESIA ATTENDED CARE: Performed by: STUDENT IN AN ORGANIZED HEALTH CARE EDUCATION/TRAINING PROGRAM

## 2024-09-18 PROCEDURE — 3600000012 HC SURGERY LEVEL 2 ADDTL 15MIN: Performed by: STUDENT IN AN ORGANIZED HEALTH CARE EDUCATION/TRAINING PROGRAM

## 2024-09-18 PROCEDURE — 3600000002 HC SURGERY LEVEL 2 BASE: Performed by: STUDENT IN AN ORGANIZED HEALTH CARE EDUCATION/TRAINING PROGRAM

## 2024-09-18 PROCEDURE — 80069 RENAL FUNCTION PANEL: CPT

## 2024-09-18 PROCEDURE — 2580000003 HC RX 258: Performed by: NURSE ANESTHETIST, CERTIFIED REGISTERED

## 2024-09-18 PROCEDURE — 7100000000 HC PACU RECOVERY - FIRST 15 MIN: Performed by: STUDENT IN AN ORGANIZED HEALTH CARE EDUCATION/TRAINING PROGRAM

## 2024-09-18 PROCEDURE — 87075 CULTR BACTERIA EXCEPT BLOOD: CPT

## 2024-09-18 PROCEDURE — 2580000003 HC RX 258: Performed by: STUDENT IN AN ORGANIZED HEALTH CARE EDUCATION/TRAINING PROGRAM

## 2024-09-18 PROCEDURE — 85027 COMPLETE CBC AUTOMATED: CPT

## 2024-09-18 PROCEDURE — 83735 ASSAY OF MAGNESIUM: CPT

## 2024-09-18 PROCEDURE — 73630 X-RAY EXAM OF FOOT: CPT

## 2024-09-18 PROCEDURE — 3700000001 HC ADD 15 MINUTES (ANESTHESIA): Performed by: STUDENT IN AN ORGANIZED HEALTH CARE EDUCATION/TRAINING PROGRAM

## 2024-09-18 PROCEDURE — 6360000002 HC RX W HCPCS: Performed by: STUDENT IN AN ORGANIZED HEALTH CARE EDUCATION/TRAINING PROGRAM

## 2024-09-18 PROCEDURE — 6370000000 HC RX 637 (ALT 250 FOR IP): Performed by: ANESTHESIOLOGY

## 2024-09-18 PROCEDURE — 94640 AIRWAY INHALATION TREATMENT: CPT

## 2024-09-18 PROCEDURE — A4217 STERILE WATER/SALINE, 500 ML: HCPCS | Performed by: STUDENT IN AN ORGANIZED HEALTH CARE EDUCATION/TRAINING PROGRAM

## 2024-09-18 PROCEDURE — 6370000000 HC RX 637 (ALT 250 FOR IP): Performed by: INTERNAL MEDICINE

## 2024-09-18 PROCEDURE — 87205 SMEAR GRAM STAIN: CPT

## 2024-09-18 PROCEDURE — 88311 DECALCIFY TISSUE: CPT

## 2024-09-18 PROCEDURE — 7100000001 HC PACU RECOVERY - ADDTL 15 MIN: Performed by: STUDENT IN AN ORGANIZED HEALTH CARE EDUCATION/TRAINING PROGRAM

## 2024-09-18 PROCEDURE — 88304 TISSUE EXAM BY PATHOLOGIST: CPT

## 2024-09-18 PROCEDURE — 2580000003 HC RX 258: Performed by: INTERNAL MEDICINE

## 2024-09-18 PROCEDURE — 87070 CULTURE OTHR SPECIMN AEROBIC: CPT

## 2024-09-18 PROCEDURE — 2709999900 HC NON-CHARGEABLE SUPPLY: Performed by: STUDENT IN AN ORGANIZED HEALTH CARE EDUCATION/TRAINING PROGRAM

## 2024-09-18 PROCEDURE — 1200000000 HC SEMI PRIVATE

## 2024-09-18 PROCEDURE — 0Y6N0ZF DETACHMENT AT LEFT FOOT, PARTIAL 5TH RAY, OPEN APPROACH: ICD-10-PCS | Performed by: STUDENT IN AN ORGANIZED HEALTH CARE EDUCATION/TRAINING PROGRAM

## 2024-09-18 PROCEDURE — 6360000002 HC RX W HCPCS: Performed by: NURSE ANESTHETIST, CERTIFIED REGISTERED

## 2024-09-18 RX ORDER — ONDANSETRON 2 MG/ML
INJECTION INTRAMUSCULAR; INTRAVENOUS
Status: DISCONTINUED | OUTPATIENT
Start: 2024-09-18 | End: 2024-09-18 | Stop reason: SDUPTHER

## 2024-09-18 RX ORDER — NALOXONE HYDROCHLORIDE 0.4 MG/ML
INJECTION, SOLUTION INTRAMUSCULAR; INTRAVENOUS; SUBCUTANEOUS PRN
Status: DISCONTINUED | OUTPATIENT
Start: 2024-09-18 | End: 2024-09-18 | Stop reason: HOSPADM

## 2024-09-18 RX ORDER — ONDANSETRON 2 MG/ML
4 INJECTION INTRAMUSCULAR; INTRAVENOUS EVERY 30 MIN PRN
Status: DISCONTINUED | OUTPATIENT
Start: 2024-09-18 | End: 2024-09-18 | Stop reason: HOSPADM

## 2024-09-18 RX ORDER — LIDOCAINE HYDROCHLORIDE 20 MG/ML
INJECTION, SOLUTION INFILTRATION; PERINEURAL
Status: DISCONTINUED | OUTPATIENT
Start: 2024-09-18 | End: 2024-09-18 | Stop reason: SDUPTHER

## 2024-09-18 RX ORDER — DEXAMETHASONE SODIUM PHOSPHATE 4 MG/ML
INJECTION, SOLUTION INTRA-ARTICULAR; INTRALESIONAL; INTRAMUSCULAR; INTRAVENOUS; SOFT TISSUE
Status: DISCONTINUED | OUTPATIENT
Start: 2024-09-18 | End: 2024-09-18 | Stop reason: SDUPTHER

## 2024-09-18 RX ORDER — OXYCODONE HYDROCHLORIDE 5 MG/1
10 TABLET ORAL PRN
Status: DISCONTINUED | OUTPATIENT
Start: 2024-09-18 | End: 2024-09-18 | Stop reason: HOSPADM

## 2024-09-18 RX ORDER — SODIUM CHLORIDE 0.9 % (FLUSH) 0.9 %
5-40 SYRINGE (ML) INJECTION EVERY 12 HOURS SCHEDULED
Status: DISCONTINUED | OUTPATIENT
Start: 2024-09-18 | End: 2024-09-18 | Stop reason: HOSPADM

## 2024-09-18 RX ORDER — SODIUM CHLORIDE 0.9 % (FLUSH) 0.9 %
5-40 SYRINGE (ML) INJECTION PRN
Status: DISCONTINUED | OUTPATIENT
Start: 2024-09-18 | End: 2024-09-18 | Stop reason: HOSPADM

## 2024-09-18 RX ORDER — OXYCODONE HYDROCHLORIDE 5 MG/1
5 TABLET ORAL EVERY 4 HOURS PRN
Status: DISCONTINUED | OUTPATIENT
Start: 2024-09-18 | End: 2024-09-22 | Stop reason: HOSPADM

## 2024-09-18 RX ORDER — PROPOFOL 10 MG/ML
INJECTION, EMULSION INTRAVENOUS
Status: DISCONTINUED | OUTPATIENT
Start: 2024-09-18 | End: 2024-09-18 | Stop reason: SDUPTHER

## 2024-09-18 RX ORDER — SODIUM CHLORIDE, SODIUM LACTATE, POTASSIUM CHLORIDE, CALCIUM CHLORIDE 600; 310; 30; 20 MG/100ML; MG/100ML; MG/100ML; MG/100ML
INJECTION, SOLUTION INTRAVENOUS
Status: DISCONTINUED | OUTPATIENT
Start: 2024-09-18 | End: 2024-09-18 | Stop reason: SDUPTHER

## 2024-09-18 RX ORDER — OXYCODONE HYDROCHLORIDE 5 MG/1
5 TABLET ORAL PRN
Status: DISCONTINUED | OUTPATIENT
Start: 2024-09-18 | End: 2024-09-18 | Stop reason: HOSPADM

## 2024-09-18 RX ORDER — MAGNESIUM HYDROXIDE 1200 MG/15ML
LIQUID ORAL CONTINUOUS PRN
Status: COMPLETED | OUTPATIENT
Start: 2024-09-18 | End: 2024-09-18

## 2024-09-18 RX ORDER — SODIUM CHLORIDE 9 MG/ML
INJECTION, SOLUTION INTRAVENOUS PRN
Status: DISCONTINUED | OUTPATIENT
Start: 2024-09-18 | End: 2024-09-18 | Stop reason: HOSPADM

## 2024-09-18 RX ORDER — BUPIVACAINE HYDROCHLORIDE 5 MG/ML
INJECTION, SOLUTION EPIDURAL; INTRACAUDAL PRN
Status: DISCONTINUED | OUTPATIENT
Start: 2024-09-18 | End: 2024-09-18 | Stop reason: ALTCHOICE

## 2024-09-18 RX ADMIN — VANCOMYCIN HYDROCHLORIDE 750 MG: 750 INJECTION, POWDER, LYOPHILIZED, FOR SOLUTION INTRAVENOUS at 23:29

## 2024-09-18 RX ADMIN — HEPARIN SODIUM 5000 UNITS: 5000 INJECTION INTRAVENOUS; SUBCUTANEOUS at 22:20

## 2024-09-18 RX ADMIN — PREDNISOLONE ACETATE 2 DROP: 10 SUSPENSION/ DROPS OPHTHALMIC at 01:02

## 2024-09-18 RX ADMIN — PREDNISOLONE ACETATE 2 DROP: 10 SUSPENSION/ DROPS OPHTHALMIC at 12:16

## 2024-09-18 RX ADMIN — Medication 2 AMPULE: at 15:16

## 2024-09-18 RX ADMIN — Medication 2 PUFF: at 21:12

## 2024-09-18 RX ADMIN — Medication 10 ML: at 08:21

## 2024-09-18 RX ADMIN — CEFEPIME 1000 MG: 1 INJECTION, POWDER, FOR SOLUTION INTRAMUSCULAR; INTRAVENOUS at 19:09

## 2024-09-18 RX ADMIN — PREDNISOLONE ACETATE 2 DROP: 10 SUSPENSION/ DROPS OPHTHALMIC at 21:16

## 2024-09-18 RX ADMIN — LEVOTHYROXINE SODIUM 50 MCG: 0.05 TABLET ORAL at 08:21

## 2024-09-18 RX ADMIN — ONDANSETRON 4 MG: 2 INJECTION INTRAMUSCULAR; INTRAVENOUS at 16:49

## 2024-09-18 RX ADMIN — FLUTICASONE PROPIONATE 1 SPRAY: 50 SPRAY, METERED NASAL at 08:21

## 2024-09-18 RX ADMIN — Medication 2 PUFF: at 08:39

## 2024-09-18 RX ADMIN — SODIUM CHLORIDE, SODIUM LACTATE, POTASSIUM CHLORIDE, AND CALCIUM CHLORIDE: .6; .31; .03; .02 INJECTION, SOLUTION INTRAVENOUS at 16:13

## 2024-09-18 RX ADMIN — DEXAMETHASONE SODIUM PHOSPHATE 8 MG: 4 INJECTION, SOLUTION INTRAMUSCULAR; INTRAVENOUS at 16:49

## 2024-09-18 RX ADMIN — TIOTROPIUM BROMIDE INHALATION SPRAY 2 PUFF: 3.12 SPRAY, METERED RESPIRATORY (INHALATION) at 08:37

## 2024-09-18 RX ADMIN — PREDNISOLONE ACETATE 2 DROP: 10 SUSPENSION/ DROPS OPHTHALMIC at 08:21

## 2024-09-18 RX ADMIN — URSODIOL 750 MG: 500 TABLET, FILM COATED ORAL at 08:22

## 2024-09-18 RX ADMIN — PROPOFOL 100 MG: 10 INJECTION, EMULSION INTRAVENOUS at 16:17

## 2024-09-18 RX ADMIN — LIDOCAINE HYDROCHLORIDE 60 MG: 20 INJECTION, SOLUTION INFILTRATION; PERINEURAL at 16:17

## 2024-09-18 ASSESSMENT — PAIN SCALES - GENERAL
PAINLEVEL_OUTOF10: 0
PAINLEVEL_OUTOF10: 0

## 2024-09-18 ASSESSMENT — PAIN - FUNCTIONAL ASSESSMENT
PAIN_FUNCTIONAL_ASSESSMENT: NONE - DENIES PAIN
PAIN_FUNCTIONAL_ASSESSMENT: NONE - DENIES PAIN

## 2024-09-18 NOTE — PROGRESS NOTES
Transportation arrived. Pt transported to assigned room #535 in stable condition.     Called and ordered dinner tray for pt which will be sent to her assigned room.

## 2024-09-18 NOTE — BRIEF OP NOTE
Brief Postoperative Note      Patient: Treasure Fatima  YOB: 1945  MRN: [id number]    Date of Procedure: 9/18/2024    Pre-Op Diagnosis Codes:      * Abscess of left foot [L02.612]; cellulitis, left foot; DMII; osteomyelitis, left foot   Post-Op Diagnosis: Same       Procedure:   Incision and drainage below fascia, left foot  Metatarsal bone resection, left foot     Surgeon(s):  Lelo Montilla MD    Assistant:  Surgical Assistant: Mc Augustin    Anesthesia: General    Estimated Blood Loss (mL): less than 50 mL    Complications: None    Specimens: Left foot deep tissue for C&S; Left 5th MT head for C&S/path  ID Type Source Tests Collected by Time Destination   1 : DEEP TISSUE, LEFT FOOT Tissue Tissue CULTURE, TISSUE Lelo Montilla MD 9/18/2024 1640    2 : LEFT FIFTH METATARSAL HEAD Specimen Foot CULTURE, SURGICAL Lelo Montilla MD 9/18/2024 1648    A : LEFT FIFTH METATARSAL HEAD Specimen Foot SURGICAL PATHOLOGY Lelo Montilla MD 9/18/2024 1648      Implants: None      Drains: None    Findings: mild necrotic subcutaneous tissue, fat, and deep fascia to the plantar 5th MT head, all excised; no formal deep space abscess; 5th MT head devitalized in appearance, resected, and sent for C&S/path; good vascular perfusion     Electronically signed by Lelo Montilla MD on 9/18/2024 at 5:13 PM

## 2024-09-18 NOTE — CONSULTS
Podiatry Consult Note      Reason for Consult:  cellulitis vs osteo  Requesting Physician:  Mani Lopez    Chief Complaint:  pain, swelling, redness to left foot    History of Present Illness:              The patient is an 79 y.o. female with significant past medical history of COPD, cirrhosis, and hypothyroidism who presents with worsening redness, swelling, and pain to her left foot for the past several days. Pt reports she was seeing Dr. Goldstein OP for a wound to the bottom of her left foot for the past month. She states it was doing well but then just recently worsened again. She states the ED cut in to the area yesterday evening, pus came out, and they took cultures. She states some of her pain has improved a little but the area is still really red. She denies any C/F/N/V/SOB/CP. Denies any other pedal complaints at time of encounter.     Past Medical History:        Diagnosis Date    Acid reflux     Arthritis     COPD (chronic obstructive pulmonary disease) (HCC)     Pneumonia     Primary biliary cirrhosis (HCC)     Thyroid disease     hypothyroidism       Past Surgical History:        Procedure Laterality Date    COLONOSCOPY N/A 3/12/2021    COLONOSCOPY DIAGNOSTIC performed by Niko Farley MD at [facility] ENDOSCOPY    HYSTERECTOMY (CERVIX STATUS UNKNOWN)      UPPER GASTROINTESTINAL ENDOSCOPY N/A 3/12/2021    EGD BAND LIGATION performed by Niko Farley MD at [facility] ENDOSCOPY    UPPER GASTROINTESTINAL ENDOSCOPY N/A 11/24/2021    EGD BAND LIGATION performed by Violet Kelly MD at [facility] ENDOSCOPY    UPPER GASTROINTESTINAL ENDOSCOPY N/A 12/21/2021    EGD performed by Violet Kelly MD at [facility] ENDOSCOPY    UPPER GASTROINTESTINAL ENDOSCOPY  12/21/2021    EGD BAND LIGATION performed by Violet Kelly MD at [facility] ENDOSCOPY    UPPER GASTROINTESTINAL ENDOSCOPY N/A 1/18/2022    EGD DIAGNOSTIC ONLY performed by Violet Kelly MD at [facility] ENDOSCOPY    UPPER GASTROINTESTINAL ENDOSCOPY  1/18/2022  me with any questions.    Lelo Montilla DPM  Office: [phone]  Cell: [phone]

## 2024-09-18 NOTE — PROGRESS NOTES
Pt brought to PACU. Report obtained from OR RN and anesthesia. Pt placed on monitor and is on room air. Vitals taken at this time are below:  Vitals:    09/18/24 1715   BP: 119/65   Pulse: 88   Resp: 15   Temp: 97.8 °F (36.6 °C)   SpO2: 98%     Pt is drowsy but easily rousable to voice. Pt is answering questions and following commands appropriately. Pt denies complaint of pain at this time. Pt's L foot surgical incision is clean, dry, and intact with xeroform, 4x4 gauze, ABD, kerlix, and ACE wrap. Pt's L popliteal pulse palpable +3 with capillary refill < 3 sec.

## 2024-09-18 NOTE — CARE COORDINATION
Case Management Assessment  Initial Evaluation    Date/Time of Evaluation: 9/18/2024 2:29 PM  Assessment Completed by: MARLENY SHEPARD RN    If patient is discharged prior to next notation, then this note serves as note for discharge by case management.    Patient Name: Treasure Fatima                   YOB: 1945  Diagnosis: Cellulitis [L03.90]  Cellulitis of left foot [L03.116]                   Date / Time: 9/17/2024  3:05 PM    Patient Admission Status: Inpatient   Readmission Risk (Low < 19, Mod (19-27), High > 27): Readmission Risk Score: 15.7    Current PCP: Keisha Tsang MD  PCP verified by CM? Yes    Chart Reviewed: Yes      History Provided by: Patient  Patient Orientation: Alert and Oriented    Patient Cognition: Alert    Hospitalization in the last 30 days (Readmission):  No    If yes, Readmission Assessment in CM Navigator will be completed.    Advance Directives:      Code Status: Full Code   Patient's Primary Decision Maker is: Legal Next of Kin (requeste family to provide paperwork)    Primary Decision Maker: ERNESTINA REYNA - [phone]    Secondary Decision Maker: ABHIJEET LACY - Daughter-in-Law - [phone]    Discharge Planning:    Patient lives with: Children, Family Members Type of Home: House  Primary Care Giver: Self  Patient Support Systems include: Children, Family Members   Current Financial resources: Medicare  Current community resources: None  Current services prior to admission: None            Current DME:              Type of Home Care services:  OT, PT, Nursing Services    ADLS  Prior functional level: Independent in ADLs/IADLs  Current functional level: Assistance with the following:, Bathing, Dressing, Toileting, Shopping, Housework, Mobility    PT AM-PAC:   /24  OT AM-PAC:   /24    Family can provide assistance at DC: Yes  Would you like Case Management to discuss the discharge plan with any other family members/significant others, and if so, who? Yes

## 2024-09-18 NOTE — PROGRESS NOTES
Pt tolerating PO drinks and snack without difficulty. Pt continues to deny complaint of pain. Checked surgery waiting area and did not see pt's family. Will check with floor nurse to see if family has returned to pt's room when report is called.

## 2024-09-18 NOTE — ACP (ADVANCE CARE PLANNING)
Advance Care Planning     General Advance Care Planning (ACP) Conversation    Date of Conversation: 9/18/2024  Conducted with: Patient with Decision Making Capacity  Other persons present: None  Daughter in Law     Healthcare Decision Maker:   Primary Decision Maker: ERNESTINA REYNA - [phone]    Secondary Decision Maker: ABHIJEET LACY - Daughter-in-Law - [phone]     Today we documented Decision Maker(s) consistent with Legal Next of Kin hierarchy.  Content/Action Overview:  Has ACP document(s) NOT on file - requested patient to provide  Reviewed DNR/DNI and patient elects Full Code (Attempt Resuscitation)        Length of Voluntary ACP Conversation in minutes:  <16 minutes (Non-Billable)    MARLENY SHEPARD RN

## 2024-09-18 NOTE — PROGRESS NOTES
Hospital Medicine Progress Note      Date of Admission: 9/17/2024  Hospital Day: 2    Chief Admission Complaint:  Left foot pain     Subjective:  Pain is controlled. Plan for OR.     Presenting Admission History:       79 y.o. female who presented to [facility] with progressive L foot swelling and pain.  Underwent local I&D in ED and placed on ABX.  She is well known to Podiatry - consulted from ED and appreciated in advance.      The patient denies any fever/chills or other signs/sxs of systemic illness or identifiable aggravating/alleviating factors\"        Assessment/Plan:       Cellulitis and abscess of L foot:  s/p I&D in ED.  Podiatry consulted. Plan for surgical I&D and possible 5th ray amputation this evening. Continue Vanco/Cefepime, requiring serial renal monitoring for nephrotoxicity.    PBC - controlled on home Actigall - continued.  No acute issues and GI NOT YET consulted.      COPD - w/out chronic hypoxic respiratory failure on no baseline home O2.  Controlled on home medication regimen - continued.      HypoThyroid - clinically euthyroid on oral replacement therapy. Continue, w/ outpt monitoring as previously arranged.     Physical Exam Performed:      General appearance:  No apparent distress  Respiratory:  Normal respiratory effort.   Cardiovascular:  Regular rate and rhythm.  Abdomen:  Soft, non-tender, non-distended.  Musculoskelatal:  No edema  Neurologic:  Non-focal  Psychiatric:  Alert and oriented  Skin: Left Foot plantar cellulitis/abscess    BP (!) 106/49   Pulse 87   Temp 98.1 °F (36.7 °C) (Oral)   Resp 16   Ht 1.626 m (5' 4.02\")   Wt 40.4 kg (89 lb)   SpO2 98%   BMI 15.27 kg/m²     Diet: Diet NPO Exceptions are: Ice Chips, Sips of Water with Meds  DVT Prophylaxis: [x]PPx LMWH  []SQ Heparin  []IPC/SCDs  []Eliquis  []Xarelto  []Coumadin  [] Heparin Drip  []Other -      Code status: Full Code  PT/OT Eval Status:   [x]NOT yet ordered  []Ordered and Pending   []Seen with  significance   [x] Appropriate follow-up labs were ordered  [] Collateral history obtained from:      Medications:  Personally reviewed in detail in conjunction w/ labs as documented for evidence of drug toxicity.     Infusion Medications    sodium chloride       Scheduled Medications    fluticasone  1 spray Nasal Daily    levothyroxine  50 mcg Oral Daily    prednisoLONE acetate  2 drop Both Eyes 4x Daily    tiotropium  2 puff Inhalation Daily RT    ursodiol  750 mg Oral Daily    sodium chloride flush  5-40 mL IntraVENous 2 times per day    heparin (porcine)  5,000 Units SubCUTAneous BID    cefepime  1,000 mg IntraVENous Q24H    vancomycin  750 mg IntraVENous Q24H    budesonide-formoterol  2 puff Inhalation BID RT     PRN Meds: albuterol sulfate HFA, sodium chloride flush, sodium chloride, ondansetron **OR** ondansetron, polyethylene glycol, acetaminophen **OR** acetaminophen     Labs:  Personally reviewed and interpreted for clinical significance.     Recent Labs     09/17/24  1312 09/18/24  0543   WBC 7.5 6.0   HGB 12.2 10.1*   HCT 37.1 30.4*   * 119*     Recent Labs     09/17/24  1312 09/18/24  0543   * 137   K 4.7 4.2    109   CO2 21 19*   BUN 25* 25*   CREATININE 1.2 1.2   CALCIUM 9.6 8.9   MG  --  1.90   PHOS  --  3.5     No results for input(s): \"PROBNP\", \"TROPHS\" in the last 72 hours.  No results for input(s): \"LABA1C\" in the last 72 hours.  Recent Labs     09/17/24  1312   AST 30   ALT 15   BILITOT 0.7   ALKPHOS 184*     Recent Labs     09/17/24  1313   LACTA 2.3*       Urine Cultures: No results found for: \"LABURIN\"  Blood Cultures:   Lab Results   Component Value Date/Time    BC No Growth after 4 days of incubation. 08/17/2024 08:11 PM     Lab Results   Component Value Date/Time    BLOODCULT2 No Growth after 4 days of incubation. 08/17/2024 08:11 PM     Organism:   Lab Results   Component Value Date/Time    ORG Pseudomonas aeruginosa 03/30/2019 12:25 AM         Ramiro Neil MD

## 2024-09-18 NOTE — OP NOTE
Operative Note      Patient: Treasure Fatima  YOB: 1945  MRN: [id number]    Date of Procedure: 9/18/2024    Pre-Op Diagnosis: abscess, left foot; DMII; cellulitis, left foot; osteomyelitis, left foot   Post-Op Diagnosis: Same       Procedures:   Incision and drainage below fascia, left foot (CPT 52416)  Metatarsal bone resection, left foot (CPT 02802)    Surgeon: Lelo Montilla DPM  Assistant: Surgical Assistant: Mc Augustin    Anesthesia: General    Injections: 20 mL 0.5% marcaine plain  Materials: 2-0 vicryl, 2-0 nylon  Tourniquet: Pneumatic calf @ 275 mmHg    Estimated Blood Loss (mL): less than 50 mL  Complications: None    Specimens: left foot deep tissue for C&S; left 5th MT head for C&S/path   ID Type Source Tests Collected by Time Destination   1 : DEEP TISSUE, LEFT FOOT Tissue Tissue CULTURE, TISSUE Lelo Montilla MD 9/18/2024 1640    2 : LEFT FIFTH METATARSAL HEAD Specimen Foot CULTURE, SURGICAL Lelo Montilla MD 9/18/2024 1648    A : LEFT FIFTH METATARSAL HEAD Specimen Foot SURGICAL PATHOLOGY Lelo Montilla MD 9/18/2024 1648        Findings: mild necrotic subcutaneous tissue, fat, and deep fascia to the plantar 5th MT head, all excised; no formal deep space abscess; 5th MT head devitalized in appearance, resected, and sent for C&S/path; good vascular perfusion     Indications for Procedure: Treasure Fatima, is an 79 year old female who has a purulent draining sinus to her plantar left 5th MPJ with cellulitis to the dorsal lateral forefoot recalcitrant to conservative therapy. Pt also with possible abscess to the plantar 5th MT head noted on prior MRI. It was determined patient would benefit from above surgical intervention. All risks (including but not limited to infection, blood loss, blood clots such as DVT and PE, injuries to nerves, vessels, tendons, ligaments, bone and other structures, increased pain, stiffness, significant scarring, and recurrence with the need for additional

## 2024-09-18 NOTE — ED PROVIDER NOTES
[place] [id number] - MED SURG/ORTHO  Emergency Department Encounter    Patient Name: Treasure Fatima  MRN: [id number]  YOB: 1945  Date of Evaluation: 9/17/2024  Provider: Keisha Tsang MD  Note Started: 11:26 AM EDT 9/18/24    CHIEF COMPLAINT  Foot Pain (Pt reports that she was seen here and admitted recently for her Left foot osteomyelitis. Pt was d/c with abx, completed them approx 2 weeks ago and states she had noticed the redness and pain started on Friday. )    SHARED SERVICE VISIT   I have seen and evaluated this patient with my supervising physician, Dr. Lopez.    HISTORY OF PRESENT ILLNESS  History From: Patient    Limitations to history : None    Social Determinants Significantly Affecting Health : None    Treasure Fatima is a 79 y.o. female who presents to the ED for evaluation of left foot infection.  Patient states that about 3 weeks ago she was admitted to the hospital for left foot cellulitis with concerns for possible osteomyelitis.  Patient states that she was seen by the podiatrist at that time and was in the hospital for 4 days with IV antibiotics and then sent home with 10-day course of doxycycline.  Patient states that she followed up outpatient with the podiatrist and her cellulitis had healed well however over the past 3 days she has noticed that the erythema, swelling, and pain to the left foot has returned.  Patient denies any associated symptoms however states that it is painful to ambulate.  Patient denies fever, chills, body aches, headache, nausea, vomiting, shortness of breath, chest pain, abdominal pain, dysuria, hematuria, neck pain, back pain, and lightheadedness.    No other complaints, modifying factors or associated symptoms.     Nursing notes reviewed were all reviewed and agreed with or any disagreements were addressed in the HPI.    PMH:  Past Medical History:   Diagnosis Date    Acid reflux     Arthritis     COPD (chronic obstructive pulmonary disease) ([facility])   133 (*)     Glucose 294 (*)     BUN 25 (*)     Est, Glom Filt Rate 46 (*)     Albumin 3.2 (*)     Albumin/Globulin Ratio 0.7 (*)     Alkaline Phosphatase 184 (*)     All other components within normal limits   LACTIC ACID - Abnormal; Notable for the following components:    Lactic Acid 2.3 (*)     All other components within normal limits   C-REACTIVE PROTEIN - Abnormal; Notable for the following components:    CRP 17.7 (*)     All other components within normal limits   SEDIMENTATION RATE - Abnormal; Notable for the following components:    Sed Rate, Automated 81 (*)     All other components within normal limits   CBC - Abnormal; Notable for the following components:    RBC 3.36 (*)     Hemoglobin 10.1 (*)     Hematocrit 30.4 (*)     Platelets 119 (*)     All other components within normal limits   RENAL FUNCTION PANEL - Abnormal; Notable for the following components:    CO2 19 (*)     Glucose 121 (*)     BUN 25 (*)     Est, Glom Filt Rate 46 (*)     Albumin 2.7 (*)     All other components within normal limits   CULTURE, ANAEROBIC AND AEROBIC    Narrative:     ORDER#: [id number]                          ORDERED BY: PRIMO COLEMAN                  SOURCE: Abscess left foot                  COLLECTED:  09/17/24 17:06                  ANTIBIOTICS AT VERONIQUE.:                      RECEIVED :  09/17/24 18:31   CULTURE, BLOOD 1   CULTURE, BLOOD 2   MAGNESIUM     When ordered, only abnormal lab results are displayed.  All other labs were within normal range or not returned as of this dictation.     RADIOLOGY  Non-plain film images such as CT, U/S, and MRI are read by the radiologist.  Plain radiographic images are visualized and preliminarily interpreted by the ED Provider with the below findings:     Interpretation per the Radiologist below, if available at the time of this note:  XR FOOT LEFT (MIN 3 VIEWS)   Final Result   No acute osseous abnormality.  Soft tissue swelling is seen           PROCEDURES  Unless otherwise

## 2024-09-18 NOTE — PROGRESS NOTES
Comprehensive Nutrition Assessment    Type and Reason for Visit:  Initial, Positive Nutrition Screen    Nutrition Recommendations/Plan:   Resume regular diet when medically appropriate   Add ensure w/ meals when able to consume PO  RD to order new updated weight  Monitor nutrition adequacy, pertinent labs, bowel habits, wt changes, and clinical progress     Malnutrition Assessment:  Malnutrition Status:  Severe malnutrition (09/18/24 1418)    Context:  Chronic Illness     Findings of the 6 clinical characteristics of malnutrition:  Energy Intake:  75% or less estimated energy requirements for 1 month or longer    Body Fat Loss:  Mild body fat loss     Muscle Mass Loss:  Severe muscle mass loss Clavicles (pectoralis & deltoids), Temples (temporalis)  Fluid Accumulation:  Mild Extremities    Nutrition Assessment:    Positive nutrition screen for wt loss and poor intake: 79 y.o. f w/ PMH of COPD admitted w/ progressive L foot swelling and pain. S/p I&D in ED. Podiatry following, plan for left foot I&D w/ possible partial 5th ray amputation today. NPO for procedure. Previously on regular diet, 1 intakes of 51-76% recorded in EMR. Pt reports good intake and stable weight PTA. Pt's family member reports downward trend of wt loss over the past few years d/t liver problems. Reports UBW= 100 lb. GRACIELA wt changes d/t stated weight, will order updated weight. Pt meets criteria for severe malnutrition d/t NFPE. Pt reports drinking boost BID PTA, will add ensure when able. Recommend resuming diet when medically feasible. Will monitor.    Nutrition Related Findings:    LLE non-pitting edema. Labs reviewed. Pt reports BM yesterday Wound Type: None       Current Nutrition Intake & Therapies:    Average Meal Intake: NPO  Average Supplements Intake: NPO  Diet NPO Exceptions are: Ice Chips, Sips of Water with Meds    Anthropometric Measures:  Height: 162.6 cm (5' 4.02\")  Ideal Body Weight (IBW): 120 lbs (55 kg)       Current Body  Weight: 40.4 kg (89 lb), 74.2 % IBW. Weight Source: Stated  Current BMI (kg/m2): 15.3        Weight Adjustment For: No Adjustment                 BMI Categories: Underweight (BMI less than 22) age over 65    Estimated Daily Nutrient Needs:  Energy Requirements Based On: Kcal/kg (30-35 kcals/kg)  Weight Used for Energy Requirements: Current (41 kg)  Energy (kcal/day): 7586-4469  Weight Used for Protein Requirements: Current (1.2-1.6 g/kg)  Protein (g/day): 49-66 g  Method Used for Fluid Requirements: 1 ml/kcal  Fluid (ml/day):      Nutrition Diagnosis:   Severe malnutrition related to inadequate protein-energy intake as evidenced by poor intake prior to admission, NPO or clear liquid status due to medical condition, Criteria as identified in malnutrition assessment, severe muscle loss, mild loss of subcutaneous fat, weight loss, BMI    Nutrition Interventions:   Food and/or Nutrient Delivery: Start Oral Diet, Start Oral Nutrition Supplement  Nutrition Education/Counseling: Education not appropriate  Coordination of Nutrition Care: Continue to monitor while inpatient       Goals:     Goals: Meet at least 75% of estimated needs, prior to discharge       Nutrition Monitoring and Evaluation:   Behavioral-Environmental Outcomes: None Identified  Food/Nutrient Intake Outcomes: Food and Nutrient Intake, Supplement Intake  Physical Signs/Symptoms Outcomes: Weight, Nutrition Focused Physical Findings, Biochemical Data    Discharge Planning:    Continue current diet, Continue Oral Nutrition Supplement     Amalia Funk, MS, RD, LD  Contact: Office: [phone]; [place]: [phone]

## 2024-09-19 LAB — VANCOMYCIN SERPL-MCNC: 12.7 UG/ML

## 2024-09-19 PROCEDURE — 2580000003 HC RX 258: Performed by: STUDENT IN AN ORGANIZED HEALTH CARE EDUCATION/TRAINING PROGRAM

## 2024-09-19 PROCEDURE — 97116 GAIT TRAINING THERAPY: CPT

## 2024-09-19 PROCEDURE — 80202 ASSAY OF VANCOMYCIN: CPT

## 2024-09-19 PROCEDURE — 97162 PT EVAL MOD COMPLEX 30 MIN: CPT

## 2024-09-19 PROCEDURE — 6370000000 HC RX 637 (ALT 250 FOR IP): Performed by: STUDENT IN AN ORGANIZED HEALTH CARE EDUCATION/TRAINING PROGRAM

## 2024-09-19 PROCEDURE — 36415 COLL VENOUS BLD VENIPUNCTURE: CPT

## 2024-09-19 PROCEDURE — 97530 THERAPEUTIC ACTIVITIES: CPT

## 2024-09-19 PROCEDURE — 99222 1ST HOSP IP/OBS MODERATE 55: CPT | Performed by: INTERNAL MEDICINE

## 2024-09-19 PROCEDURE — 97535 SELF CARE MNGMENT TRAINING: CPT

## 2024-09-19 PROCEDURE — 94640 AIRWAY INHALATION TREATMENT: CPT

## 2024-09-19 PROCEDURE — 97166 OT EVAL MOD COMPLEX 45 MIN: CPT

## 2024-09-19 PROCEDURE — 1200000000 HC SEMI PRIVATE

## 2024-09-19 PROCEDURE — 6360000002 HC RX W HCPCS: Performed by: INTERNAL MEDICINE

## 2024-09-19 PROCEDURE — 6360000002 HC RX W HCPCS: Performed by: STUDENT IN AN ORGANIZED HEALTH CARE EDUCATION/TRAINING PROGRAM

## 2024-09-19 RX ORDER — CEFAZOLIN SODIUM IN 0.9 % NACL 2 G/100 ML
2000 PLASTIC BAG, INJECTION (ML) INTRAVENOUS EVERY 12 HOURS
Status: DISCONTINUED | OUTPATIENT
Start: 2024-09-19 | End: 2024-09-22 | Stop reason: HOSPADM

## 2024-09-19 RX ADMIN — TIOTROPIUM BROMIDE INHALATION SPRAY 2 PUFF: 3.12 SPRAY, METERED RESPIRATORY (INHALATION) at 08:21

## 2024-09-19 RX ADMIN — ONDANSETRON 4 MG: 4 TABLET, ORALLY DISINTEGRATING ORAL at 19:27

## 2024-09-19 RX ADMIN — SODIUM CHLORIDE 25 ML: 9 INJECTION, SOLUTION INTRAVENOUS at 17:59

## 2024-09-19 RX ADMIN — OXYCODONE HYDROCHLORIDE 5 MG: 5 TABLET ORAL at 11:55

## 2024-09-19 RX ADMIN — OXYCODONE HYDROCHLORIDE 5 MG: 5 TABLET ORAL at 01:51

## 2024-09-19 RX ADMIN — PREDNISOLONE ACETATE 2 DROP: 10 SUSPENSION/ DROPS OPHTHALMIC at 15:18

## 2024-09-19 RX ADMIN — FLUTICASONE PROPIONATE 1 SPRAY: 50 SPRAY, METERED NASAL at 10:09

## 2024-09-19 RX ADMIN — Medication 10 ML: at 10:11

## 2024-09-19 RX ADMIN — LEVOTHYROXINE SODIUM 50 MCG: 0.05 TABLET ORAL at 10:09

## 2024-09-19 RX ADMIN — HEPARIN SODIUM 5000 UNITS: 5000 INJECTION INTRAVENOUS; SUBCUTANEOUS at 10:09

## 2024-09-19 RX ADMIN — PREDNISOLONE ACETATE 2 DROP: 10 SUSPENSION/ DROPS OPHTHALMIC at 18:00

## 2024-09-19 RX ADMIN — HEPARIN SODIUM 5000 UNITS: 5000 INJECTION INTRAVENOUS; SUBCUTANEOUS at 20:13

## 2024-09-19 RX ADMIN — Medication 2 PUFF: at 08:21

## 2024-09-19 RX ADMIN — Medication 10 ML: at 20:13

## 2024-09-19 RX ADMIN — PREDNISOLONE ACETATE 2 DROP: 10 SUSPENSION/ DROPS OPHTHALMIC at 21:57

## 2024-09-19 RX ADMIN — URSODIOL 750 MG: 500 TABLET, FILM COATED ORAL at 10:50

## 2024-09-19 RX ADMIN — PREDNISOLONE ACETATE 2 DROP: 10 SUSPENSION/ DROPS OPHTHALMIC at 10:09

## 2024-09-19 RX ADMIN — Medication 2000 MG: at 18:00

## 2024-09-19 ASSESSMENT — PAIN SCALES - GENERAL
PAINLEVEL_OUTOF10: 0
PAINLEVEL_OUTOF10: 5
PAINLEVEL_OUTOF10: 7
PAINLEVEL_OUTOF10: 7
PAINLEVEL_OUTOF10: 5

## 2024-09-19 ASSESSMENT — PAIN DESCRIPTION - ORIENTATION
ORIENTATION: LEFT

## 2024-09-19 ASSESSMENT — PAIN DESCRIPTION - LOCATION
LOCATION: FOOT

## 2024-09-19 ASSESSMENT — PAIN DESCRIPTION - DESCRIPTORS
DESCRIPTORS: SORE;ACHING
DESCRIPTORS: ACHING;SORE
DESCRIPTORS: TENDER
DESCRIPTORS: SORE;TENDER

## 2024-09-19 ASSESSMENT — PAIN - FUNCTIONAL ASSESSMENT: PAIN_FUNCTIONAL_ASSESSMENT: PREVENTS OR INTERFERES SOME ACTIVE ACTIVITIES AND ADLS

## 2024-09-19 NOTE — PROGRESS NOTES
Physician Progress Note      PATIENT:               ALEKSEY NEWBERRY  CSN #:                  [id number]  :                       1945  ADMIT DATE:       2024 3:05 PM  DISCH DATE:  RESPONDING  PROVIDER #:        Lelo Montilla DPM          QUERY TEXT:    Patient admitted with abscess, cellulitis of the left foot. Noted   documentation of DM II by Podiatry . In order to support the diagnosis of   DM II, please include additional clinical indicators in your documentation.    Or please document if the diagnosis of DM II has been ruled out after further   study.    The medical record reflects the following:  Risk Factors: 80 yo w/ abscess, cellulitis of the left foot.  Clinical Indicators: Per Podiatry : DMII.  Per the ED : She denies any   trauma to the foot. Denies any fevers or chills at home. Is not a diabetic.  Treatment: No accuchecks or SSI.  No oral hypoglycemics  Options provided:  -- Diabetes Mellitus II present as evidenced by, Please document evidence.  -- Diabetes Mellitus II was ruled out  -- Other - I will add my own diagnosis  -- Disagree - Not applicable / Not valid  -- Disagree - Clinically unable to determine / Unknown  -- Refer to Clinical Documentation Reviewer    PROVIDER RESPONSE TEXT:    Diabetes Mellitus II is present as evidenced by HbA1c of 6.6 taken 24    Query created by: Rebecca Lopez on 2024 11:18 AM      Electronically signed by:  Lelo Montilla DPM 2024 10:40 AM

## 2024-09-19 NOTE — PROGRESS NOTES
Physician Progress Note      PATIENT:               ALEKSEY NEWBERRY  CSN #:                  [id number]  :                       1945  ADMIT DATE:       2024 3:05 PM  DISCH DATE:  RESPONDING  PROVIDER #:        Gian Castro MD          QUERY TEXT:    Pt admitted with LLE cellulitis. Pt noted to have DM II by Podiatry. If   possible, please document in progress notes and discharge summary the   relationship, if any, between cellulitis and DM.    The medical record reflects the following:  Risk Factors: 80 yo w/ LLE cellulitis and abscess  Clinical Indicators: Per Podiatry: Diabetes Mellitus II is present as   evidenced by HbA1c of 6.6 taken 24.  Treatment: A1c, lab monitoring  Options provided:  -- LLE cellulitis associated with Diabetes  -- LLE cellulitis unrelated to Diabetes  -- Other - I will add my own diagnosis  -- Disagree - Not applicable / Not valid  -- Disagree - Clinically unable to determine / Unknown  -- Refer to Clinical Documentation Reviewer    PROVIDER RESPONSE TEXT:    LLE cellulitis unrelated to Diabetes.    Query created by: Rebecca Lopez on 2024 11:52 AM      Electronically signed by:  Gian Castro MD 2024 6:02 PM

## 2024-09-19 NOTE — CONSULTS
Infectious Diseases   Consult Note      Reason for Consult:  deep space L foot infection    Requesting Physician:    Roldan     Date of Admission: 9/17/2024    Subjective:   CHIEF COMPLAINT:  none given       HPI:    Treasure Fatima is an 79yoF with history of primary biliary cirrhosis, hypothyroidism, COPD, GERD, OA                Admission 8/17-8/20/24 with cellulitis of the L foot.  Foot wound was debrided bedsde by Podiatry.  Deep space infection was not suspected.  Wound culture   She was discharged on doxycycline which she completed.    The foot seemed to be doing better until ~9/14/24, when increased swelling, pain, erythema was noted.  No fever or chills.  She came back to the ED 9/17/24 -   WBC was wnl.  Lactic was 2.3, CRP 17, ESR 81   XR of the foot was negative.   She was admitted for management of L foot infection.    OR 9/18/24 - I&D L foot and resection of soft and nonviable appearing 5th met head.  No discrete abscess noted.    Surgical cultures of bone and tissue are pending as well as path.      She remains AF   Pain is not well controlled but she is anxious about narcotics after losing a daughter to accidental opioid overdose.        Current abx:  Cefepime 1g q24   Vanc 750 q24        Past Surgical History:       Diagnosis Date    Acid reflux     Arthritis     COPD (chronic obstructive pulmonary disease) (HCC)     Pneumonia     Primary biliary cirrhosis (HCC)     Thyroid disease     hypothyroidism         Procedure Laterality Date    COLONOSCOPY N/A 3/12/2021    COLONOSCOPY DIAGNOSTIC performed by Niko Farley MD at [facility] ENDOSCOPY    FOOT DEBRIDEMENT Left 9/18/2024    LEFT FOOT DEBRIDEMENT INCISION AND DRAINAGE WITH PARTIAL FIFTH RAY AMPUTATION. performed by Lelo Montilla MD at [facility] OR    HYSTERECTOMY (CERVIX STATUS UNKNOWN)      UPPER GASTROINTESTINAL ENDOSCOPY N/A 3/12/2021    EGD BAND LIGATION performed by Niko Farley MD at [facility] ENDOSCOPY    UPPER GASTROINTESTINAL ENDOSCOPY N/A     PNA (pneumonia)    History of tobacco abuse    Sepsis (HCC)    Acute upper GI bleed    Acute GI bleeding    Cellulitis of fifth toe of left foot    Cellulitis    Severe malnutrition (HCC)       History of PBC, hypothyroidism, COPD, GERD, OA     Admission in 8/2024 with L foot cellulitis  There was no guiding culture data  There was no clear evidence of deep space infection - MRI then with suggestion of possible small abscess was suspected to reflect open wound on the foot  She was discharged with short course of levaquin which she completed   Foot was doing well until ~9/14/24    Readmitted 9/17/24 with worsening local signs of infection in the L foot   Wound culture with MSSA.  Blood cultures are negative to date      OR 9/18/24 - I&D with resection of soft 5th met head   Operative culture and path of the resected bone are negative to date / pending     TCP, chronic, stable, presumably from liver disease     CKD     NKDA       Recs:  Change vanc/cefepime to cefazolin for culture directed abx  Dose reduced for CKD     Will look for results of culture and path and discuss with Podiatry.    Path seems likely to show changes of OM based on description, but is not a clearance fragment, so will need Podiatry perspective on likelihood of residual OM if it is positive for OM   Management strategies for residual OM discussed with patient     Monitor renal function      Will follow     Questions addressed        Medical Decision Making:  The following items were considered in medical decision making:  Discussion of patient care with other providers  Reviewed clinical lab tests  Reviewed radiology tests  Reviewed other diagnostic tests/interventions  Independent review of radiologic images  Microbiology cultures and other micro tests reviewed       Risk of Complications/Morbidity: moderate   Illness(es)/ Infection present that pose threat to bodily function.   There is potential for severe exacerbation of infection/side

## 2024-09-19 NOTE — FLOWSHEET NOTE
09/19/24 1004   Assessment   Charting Type Shift assessment   Psychosocial   Psychosocial (WDL) WDL   Neurological   Neuro (WDL) WDL   Level of Consciousness 0   LLE Sensation  Decreased;Pain   RUE Sensation  Full sensation;No numbness;No pain;No tingling   LUE Sensation  Full sensation;No numbness;No pain;No tingling   RLE Sensation  Full sensation;No numbness;No pain;No tingling   Orientation Level Oriented X4;Oriented to person;Oriented to place;Oriented to time;Oriented to situation   Cognition Follows commands;Appropriate judgement;Appropriate safety awareness;Appropriate attention/concentration;Appropriate for developmental age   Speech Clear   [place] Coma Scale   Eye Opening 4   Best Verbal Response 5   Best Motor Response 6   Nanda Coma Scale Score 15   NIHSS Stroke Scale   NIHSS Stroke Scale Assessed No   HEENT (Head, Ears, Eyes, Nose, & Throat)   HEENT (WDL) X   Teeth Edentulous   Right Eye Impaired vision;Glasses   Left Eye Impaired vision;Glasses   Respiratory   Respiratory (WDL) WDL   Subcutaneous Air/Crepitus None   Respiratory Pattern Regular   Respiratory Depth Normal   Respiratory Quality/Effort Unlabored   Chest Assessment Chest expansion symmetrical;Trachea midline   L Breath Sounds Clear   R Breath Sounds Clear   Level of Activity/Mobility 1   Cough/Sputum   Sputum How Obtained Spontaneous cough   Cough Congested;Moist;Non-productive   Cardiac   Cardiac (WDL) WDL   Cardiac Regularity Regular   Heart Sounds S1, S2   Rhythm Interpretation   Pulse 77   Gastrointestinal   Abdominal (WDL) WDL   Pre Hospital Interventions Stool softener;laxative   RUQ Bowel Sounds Active   LUQ Bowel Sounds Active   RLQ Bowel Sounds Active   LLQ Bowel Sounds Active   Abdomen Inspection Flat   Last BM (including prior to admit) 09/18/24   GI Symptoms Constipation   Tenderness Soft;No guarding;Nontender;No rebound   Genitourinary   Genitourinary (WDL) X  (purwick)   Suprapubic Tenderness No   Dysuria (Pain/Burning

## 2024-09-19 NOTE — PROGRESS NOTES
Podiatric Surgery Daily Progress Note  Treasure Fatima  Subjective :   Patient seen and examined this am at the bedside. Patient denies any acute overnight events. Patient denies N/V/F/C/SOB. Patient denies calf pain, thigh pain, chest pain.        Objective     /61   Pulse 74   Temp 97.8 °F (36.6 °C) (Oral)   Resp 16   Ht 1.626 m (5' 4.02\")   Wt 40.4 kg (89 lb)   SpO2 97%   BMI 15.27 kg/m²      I/O:  Intake/Output Summary (Last 24 hours) at 9/19/2024 0909  Last data filed at 9/19/2024 0831  Gross per 24 hour   Intake --   Output 1425 ml   Net -1425 ml              Wt Readings from Last 3 Encounters:   09/17/24 40.4 kg (89 lb)   08/18/24 40.5 kg (89 lb 3.2 oz)   08/10/23 46.7 kg (103 lb)       LABS:    Recent Labs     09/17/24  1312 09/18/24  0543   WBC 7.5 6.0   HGB 12.2 10.1*   HCT 37.1 30.4*   * 119*        Recent Labs     09/18/24  0543      K 4.2      CO2 19*   PHOS 3.5   BUN 25*   CREATININE 1.2      No results for input(s): \"INR\", \"APTT\" in the last 72 hours.    Invalid input(s): \"PROT\"        LOWER EXTREMITY EXAMINATION    Integument:  Skin is warm, dry and supple, bilateral. Closed surgical incision to the plantar lateral left 5th MPJ w/ wound edges well coapted, all sutures intact, no SOI, no drainage. No openings in the skin on the right.     Neurologic:  Protective sensation is grossly diminished to light touch at the level of the toes, bilateral.  Vascular:  DP and PT pulses are palpable, bilateral.  CFT is brisk to all toes. Mild edema noted to the left dorsal lateral forefoot.  Musculoskeletal:  Patient has 5/5 strength on inversion/everison/dorsiflexion/plantarflexion, bilateral.  No gross musculoskeletal deformities noted.      Imaging: Left foot xray - no cortical erosions  - L foot MRI 8/18/24 - fluid collection to the 5th MT head representing possible abscess, no OM    Micro 9/17/24:   - L abscess: staph aureus     Micro 9/18/24:  - L foot deep tissue: no

## 2024-09-19 NOTE — PROGRESS NOTES
Occupational Therapy  Facility/Department: [place] [facility] [id number] - MED SURG/ORTHO  Occupational Therapy Initial Assessment and Treatment Note     Name: Treasure Fatima  : 1945  MRN: [id number]  Date of Service: 2024    Discharge Recommendations:  Subacute/Skilled Nursing Facility   Therapy discharge recommendations are subject to collaboration from the patient’s interdisciplinary healthcare team, including MD and case management recommendations.  Barriers to Home Discharge:   [] Steps to access home entry or bed/bath:   [x] Unable to transfer, ambulate, or propel wheelchair household distances without assist   [] Limited available assist at home upon discharge    [] Patient or family requests d/c to post-acute facility    [] Poor cognition/safety awareness for d/c to home alone    [x] Unable to maintain ordered weight bearing status    [] Patient with salient signs of long-standing immobility   [x] Decreased independence with ADLs   [x] Increased risk for falls   [] Other:    If pt is unable to be seen after this session, please let this note serve as discharge summary.  Please see case management note for discharge disposition.  Thank you.     Patient Diagnosis(es): The primary encounter diagnosis was Cellulitis of left foot. A diagnosis of Abscess of left foot was also pertinent to this visit.  Past Medical History:  has a past medical history of Acid reflux, Arthritis, COPD (chronic obstructive pulmonary disease) (HCC), Pneumonia, Primary biliary cirrhosis (HCC), and Thyroid disease.  Past Surgical History:  has a past surgical history that includes Hysterectomy; Colonoscopy (N/A, 3/12/2021); Upper gastrointestinal endoscopy (N/A, 3/12/2021); Upper gastrointestinal endoscopy (N/A, 2021); Upper gastrointestinal endoscopy (N/A, 2021); Upper gastrointestinal endoscopy (2021); Upper gastrointestinal endoscopy (N/A, 2022); Upper gastrointestinal endoscopy (2022); Upper gastrointestinal  training;Verbal cues  Sit to Stand: Minimum assistance;Assist X2  Stand to Sit: Minimum assistance;Assist X1 (vc's for L heel WB)  Bed to Chair: Minimum assistance;Assist X2 (Min x2 with RW and instructed on L heel WB)  Toilet Transfer: Minimum assistance;Assist X1 (with grab bars and knee scooter)     AROM: Generally decreased, functional (Advanced RA in B hands. Pt is able to grasp items such as walker)  Strength: Generally decreased, functional  Coordination: Generally decreased, functional (Impaired d/t RA)  Tone: Normal  Sensation: Intact  ADL  Feeding: Independent  Grooming: Contact guard assistance  Grooming Skilled Clinical Factors: CGA to stand at sink with CGA and L knee scooter  LE Dressing: Maximum assistance  LE Dressing Skilled Clinical Factors: For brief, L surgical shoe and R sock (Pt's grasp is limited by advanced RA in B hands)  Toileting: Moderate assistance  Toileting Skilled Clinical Factors: Mod A for clothing mgmt. SBA for hygiene while on toilet  Functional Mobility: Minimal assistance  Functional Mobility Skilled Clinical Factors: Min A for bathroom mobility with L knee scooter, vc's for safe technique. Pt unable to lock and release brake on knee scooter d/t RA in hands.        Vision  Vision: Impaired  Vision Exceptions: Wears glasses for reading  Hearing  Hearing: Exceptions to WFL  Hearing Exceptions: Hard of hearing/hearing concerns  Cognition  Overall Cognitive Status: WFL  Orientation  Overall Orientation Status: Within Functional Limits      Education Given To: Patient  Education Provided: Role of Therapy;Plan of Care;Transfer Training;Equipment;Precautions;Mobility Training;Fall Prevention Strategies;ADL Adaptive Strategies  Education Provided Comments: Pt educated on weight bearing status, post-op precautions, appropriate DME, and safe mobility with AD.  Education Method: Demonstration;Verbal  Barriers to Learning: None  Education Outcome: Verbalized understanding;Continued

## 2024-09-19 NOTE — PROGRESS NOTES
Physician Progress Note      PATIENT:               ALEKSEY NEWBERRY  CSN #:                  [id number]  :                       1945  ADMIT DATE:       2024 3:05 PM  DISCH DATE:  RESPONDING  PROVIDER #:        Gian Castro MD          QUERY TEXT:    Pt admitted with cellulitis and has malnutrition documented  by Registered   Dietician. Please further specify type of malnutrition with documentation in   the medical record.    The medical record reflects the following:  Risk Factors: 80 yo w/ hx of cirrhosis, BMI 15  Clinical Indicators: Per RD: Severe malnutrition. Energy Intake:  75% or less   estimated energy requirements for 1 month or longer.  Mild body fat loss.   Severe muscle mass loss Clavicles, temples.  Treatment: RD monitoring, Regular diet, ONS    ASPEN Criteria:    https://aspenjournals.onlinelibrary.garcia.com/doi/full/10.1177/[email or web address]  5  Options provided:  -- Severe Malnutrition  -- Other - I will add my own diagnosis  -- Disagree - Not applicable / Not valid  -- Disagree - Clinically unable to determine / Unknown  -- Refer to Clinical Documentation Reviewer    PROVIDER RESPONSE TEXT:    This patient has severe malnutrition.    Query created by: Rebecca Lopez on 2024 5:03 AM      Electronically signed by:  Gian Castro MD 2024 11:01 AM

## 2024-09-19 NOTE — PROGRESS NOTES
Hospital Medicine Progress Note      Date of Admission: 9/17/2024  Hospital Day: 3    Chief Admission Complaint:  \"L foot infection\"     Subjective:  no new c/o    Presenting Admission History:         \"79 [facility]. [facility] swelling and pain.  Underwent local I&D in ED and placed on ABX.  She is well known to Podiatry - consulted from ED and appreciated in advance.      The patient denies any fever/chills or other signs/sxs of systemic illness or identifiable aggravating/alleviating factors\"    Assessment/Plan:      Current Principal Problem:  Cellulitis      Cellulitis and abscess of L foot:  s/p I&D in ED.  Podiatry consulted s/p I&D w/ metatarsal bone resection 18 Sept w/out complications.  Continue Vanco/Cefepime.  Closely following and personally reviewed serial labs as documented in this note for evidence of ABX (Vanco) induced nephrotoxicity.       PBC - controlled on home Actigall - continued.  No acute issues and GI NOT YET consulted.      COPD - w/out chronic hypoxic respiratory failure on no baseline home O2.  Controlled on home medication regimen - continued.      HypoThyroid - clinically euthyroid on oral replacement therapy. Continue, w/ outpt monitoring as previously arranged.     Physical Exam Performed:      General appearance:  No apparent distress  Respiratory:  Normal respiratory effort.   Cardiovascular:  Regular rate and rhythm.  Abdomen:  Soft, non-tender, non-distended.  Musculoskelatal:  No edema  Neurologic:  Non-focal  Psychiatric:  Alert and oriented    /61   Pulse 74   Temp 97.8 °F (36.6 °C) (Oral)   Resp 16   Ht 1.626 m (5' 4.02\")   Wt 40.4 kg (89 lb)   SpO2 97%   BMI 15.27 kg/m²     Diet: ADULT DIET; Regular    DVT Prophylaxis: []PPx LMWH  [x]SQ Heparin  []IPC/SCDs  []Eliquis  []Xarelto  []Coumadin  []Other -      Code status: Full Code    PT/OT Eval Status:   []NOT yet ordered  [x]Ordered and Pending   []Seen with  \"TROPHS\" in the last 72 hours.  No results for input(s): \"LABA1C\" in the last 72 hours.  Recent Labs     09/17/24  1312   AST 30   ALT 15   BILITOT 0.7   ALKPHOS 184*     Recent Labs     09/17/24  1313   LACTA 2.3*       Urine Cultures: No results found for: \"LABURIN\"  Blood Cultures:   Lab Results   Component Value Date/Time    BC  09/17/2024 05:58 PM     No Growth to date.  Any change in status will be called.     Lab Results   Component Value Date/Time    BLOODCULT2  09/17/2024 05:49 PM     No Growth to date.  Any change in status will be called.     Organism:   Lab Results   Component Value Date/Time    ORG Staphylococcus aureus 09/17/2024 05:06 PM         Gian Castro MD

## 2024-09-19 NOTE — PLAN OF CARE
Problem: Discharge Planning  Goal: Discharge to home or other facility with appropriate resources  9/18/2024 2349 by Tiffanie Marroquin RN  Outcome: Progressing  9/18/2024 2348 by Tiffanie Marroquin RN  Outcome: Progressing     Problem: Pain  Goal: Verbalizes/displays adequate comfort level or baseline comfort level  9/18/2024 2349 by Tiffanie Marroquin RN  Outcome: Progressing  9/18/2024 2348 by Tiffanie Marroquin RN  Outcome: Progressing     Problem: Safety - Adult  Goal: Free from fall injury  9/18/2024 2349 by Tiffanie Marroquin RN  Outcome: Progressing  9/18/2024 2348 by Tiffanie Marroquin RN  Outcome: Progressing     Problem: Nutrition Deficit:  Goal: Optimize nutritional status  9/18/2024 2349 by Tiffanie Marroquin RN  Outcome: Progressing  9/18/2024 2348 by Tiffanie Marroquin RN  Outcome: Progressing     Problem: ABCDS Injury Assessment  Goal: Absence of physical injury  9/18/2024 2349 by Tiffanie Marroquin RN  Outcome: Progressing  9/18/2024 2348 by Tiffanie Marroquin RN  Outcome: Progressing

## 2024-09-19 NOTE — CARE COORDINATION
Chart reviewed day 2. Pt is followed by IM, ID and POD. POD 1 I&D and requiring IV abx at this time. Plan to return home w/grndtr and [facility]. Will follow for needs as they arise. Electronically signed by MARLENY SHEPARD RN on 9/19/2024 at 2:45 PM

## 2024-09-19 NOTE — PROGRESS NOTES
services?: Yes  Response To Previous Treatment: Not applicable  Family / Caregiver Present: No  Referring Practitioner: Lelo Montilla MD  Referral Date : 09/18/24  Diagnosis: Cellulitis  Follows Commands: Within Functional Limits  General Comment  Comments: Pt in bed upon arrival, RN cleared for therapy  Subjective  Subjective: pt pleasant and agreeable         Social/Functional History  Social/Functional History  Lives With: Other (comment) (grand daughter and her son)  Type of Home: House  Home Layout: Two level, Able to Live on Main level with bedroom/bathroom  Home Access: Stairs to enter without rails  Entrance Stairs - Number of Steps: 1 NAHID  Bathroom Shower/Tub: Tub/Shower unit  Bathroom Toilet: Standard  Bathroom Equipment: None  Home Equipment: Walker - Rolling  Has the patient had two or more falls in the past year or any fall with injury in the past year?: No  ADL Assistance: Independent  Homemaking Assistance: Independent  Homemaking Responsibilities: Yes  Ambulation Assistance: Independent  Transfer Assistance: Independent  Active : Yes    Vision/Hearing  Vision  Vision: Impaired  Vision Exceptions: Wears glasses for reading  Hearing  Hearing: Exceptions to WFL  Hearing Exceptions: Hard of hearing/hearing concerns      Cognition   Orientation  Overall Orientation Status: Within Functional Limits  Orientation Level: Oriented X4  Cognition  Overall Cognitive Status: WFL    Objective  Temp: 97.7 °F (36.5 °C)  Pulse: 74  Heart Rate Source: Monitor  Respirations: 16  SpO2: 96 %  O2 Device: None (Room air)  BP: (!) 109/52  MAP (Calculated): 71  BP Location: Left upper arm  BP Method: Automatic  Patient Position: Semi fowlers     Observation/Palpation  Posture: Fair  Gross Assessment  AROM: Within functional limits  Strength: Generally decreased, functional      Bed Mobility Training  Bed Mobility Training: Yes  Interventions: Safety awareness training;Verbal cues  Supine to Sit: Stand-by  assistance  Scooting: Stand-by assistance (vc's for safe technique)  Balance  Sitting: Intact  Standing: Impaired  Standing - Static: Constant support;Fair  Standing - Dynamic: Constant support;Fair  Transfer Training  Transfer Training: Yes  Interventions: Manual cues;Safety awareness training;Verbal cues  Sit to Stand: Minimum assistance;Assist X2  Stand to Sit: Minimum assistance;Assist X1 (vc's for L heel WB)  Bed to Chair: Minimum assistance;Assist X2 (Min x2 with RW and instructed on L heel WB)  Toilet Transfer: Minimum assistance;Assist X1 (with grab bars and knee scooter)  Gait  Gait Training: Yes (Pt navigate around room on knee scooter with min A to and from the bathroom, then with hop to pattern using RW with min Ax1-2. Pt unsteady on both and requires frequent cues for sequencing.)     OutComes Score    AM-PAC - Mobility    AM-PAC Basic Mobility - Inpatient   How much help is needed turning from your back to your side while in a flat bed without using bedrails?: None  How much help is needed moving from lying on your back to sitting on the side of a flat bed without using bedrails?: None  How much help is needed moving to and from a bed to a chair?: A Little  How much help is needed standing up from a chair using your arms?: A Little  How much help is needed walking in hospital room?: A Little  How much help is needed climbing 3-5 steps with a railing?: A Lot  AM-PAC Inpatient Mobility Raw Score : 19  AM-PAC Inpatient T-Scale Score : 45.44  Mobility Inpatient CMS 0-100% Score: 41.77  Mobility Inpatient CMS G-Code Modifier : CK    Goals  Short Term Goals  Time Frame for Short Term Goals: 9/26/24  Short Term Goal 1: Pt will complete bed mobility with supervision  Short Term Goal 2: pt will complete all transfers with SBA  Short Term Goal 3: pt will navigate to the bathroom via knee scooter, WC, or hop to gait pattern with CGA  Short Term Goal 4: pt will participate in 10-12 reps of BLE exercises by

## 2024-09-20 LAB
ALBUMIN SERPL-MCNC: 2.7 G/DL (ref 3.4–5)
ANION GAP SERPL CALCULATED.3IONS-SCNC: 9 MMOL/L (ref 3–16)
BUN SERPL-MCNC: 29 MG/DL (ref 7–20)
CALCIUM SERPL-MCNC: 9 MG/DL (ref 8.3–10.6)
CHLORIDE SERPL-SCNC: 106 MMOL/L (ref 99–110)
CO2 SERPL-SCNC: 22 MMOL/L (ref 21–32)
CREAT SERPL-MCNC: 1.5 MG/DL (ref 0.6–1.2)
DEPRECATED RDW RBC AUTO: 14.2 % (ref 12.4–15.4)
GFR SERPLBLD CREATININE-BSD FMLA CKD-EPI: 35 ML/MIN/{1.73_M2}
GLUCOSE SERPL-MCNC: 202 MG/DL (ref 70–99)
HCT VFR BLD AUTO: 31 % (ref 36–48)
HGB BLD-MCNC: 10.3 G/DL (ref 12–16)
MCH RBC QN AUTO: 30.5 PG (ref 26–34)
MCHC RBC AUTO-ENTMCNC: 33.3 G/DL (ref 31–36)
MCV RBC AUTO: 91.6 FL (ref 80–100)
PHOSPHATE SERPL-MCNC: 3.1 MG/DL (ref 2.5–4.9)
PLATELET # BLD AUTO: 117 K/UL (ref 135–450)
PMV BLD AUTO: 8.3 FL (ref 5–10.5)
POTASSIUM SERPL-SCNC: 4.2 MMOL/L (ref 3.5–5.1)
RBC # BLD AUTO: 3.39 M/UL (ref 4–5.2)
SODIUM SERPL-SCNC: 137 MMOL/L (ref 136–145)
WBC # BLD AUTO: 7.8 K/UL (ref 4–11)

## 2024-09-20 PROCEDURE — 80069 RENAL FUNCTION PANEL: CPT

## 2024-09-20 PROCEDURE — 97116 GAIT TRAINING THERAPY: CPT

## 2024-09-20 PROCEDURE — 94640 AIRWAY INHALATION TREATMENT: CPT

## 2024-09-20 PROCEDURE — 1200000000 HC SEMI PRIVATE

## 2024-09-20 PROCEDURE — 6360000002 HC RX W HCPCS: Performed by: STUDENT IN AN ORGANIZED HEALTH CARE EDUCATION/TRAINING PROGRAM

## 2024-09-20 PROCEDURE — 6370000000 HC RX 637 (ALT 250 FOR IP): Performed by: STUDENT IN AN ORGANIZED HEALTH CARE EDUCATION/TRAINING PROGRAM

## 2024-09-20 PROCEDURE — 6360000002 HC RX W HCPCS: Performed by: INTERNAL MEDICINE

## 2024-09-20 PROCEDURE — 97530 THERAPEUTIC ACTIVITIES: CPT

## 2024-09-20 PROCEDURE — 2580000003 HC RX 258: Performed by: STUDENT IN AN ORGANIZED HEALTH CARE EDUCATION/TRAINING PROGRAM

## 2024-09-20 PROCEDURE — 36415 COLL VENOUS BLD VENIPUNCTURE: CPT

## 2024-09-20 PROCEDURE — 85027 COMPLETE CBC AUTOMATED: CPT

## 2024-09-20 PROCEDURE — 97110 THERAPEUTIC EXERCISES: CPT

## 2024-09-20 PROCEDURE — 97535 SELF CARE MNGMENT TRAINING: CPT

## 2024-09-20 RX ADMIN — SODIUM CHLORIDE 25 ML: 9 INJECTION, SOLUTION INTRAVENOUS at 03:37

## 2024-09-20 RX ADMIN — HEPARIN SODIUM 5000 UNITS: 5000 INJECTION INTRAVENOUS; SUBCUTANEOUS at 21:44

## 2024-09-20 RX ADMIN — PREDNISOLONE ACETATE 2 DROP: 10 SUSPENSION/ DROPS OPHTHALMIC at 14:37

## 2024-09-20 RX ADMIN — SODIUM CHLORIDE: 9 INJECTION, SOLUTION INTRAVENOUS at 16:37

## 2024-09-20 RX ADMIN — TIOTROPIUM BROMIDE INHALATION SPRAY 2 PUFF: 3.12 SPRAY, METERED RESPIRATORY (INHALATION) at 07:48

## 2024-09-20 RX ADMIN — Medication 10 ML: at 09:09

## 2024-09-20 RX ADMIN — Medication 2000 MG: at 03:37

## 2024-09-20 RX ADMIN — ONDANSETRON 4 MG: 2 INJECTION INTRAMUSCULAR; INTRAVENOUS at 04:26

## 2024-09-20 RX ADMIN — URSODIOL 750 MG: 500 TABLET, FILM COATED ORAL at 09:09

## 2024-09-20 RX ADMIN — Medication 2 PUFF: at 07:48

## 2024-09-20 RX ADMIN — PREDNISOLONE ACETATE 2 DROP: 10 SUSPENSION/ DROPS OPHTHALMIC at 09:09

## 2024-09-20 RX ADMIN — PREDNISOLONE ACETATE 2 DROP: 10 SUSPENSION/ DROPS OPHTHALMIC at 21:57

## 2024-09-20 RX ADMIN — Medication 2000 MG: at 16:37

## 2024-09-20 RX ADMIN — LEVOTHYROXINE SODIUM 50 MCG: 0.05 TABLET ORAL at 07:33

## 2024-09-20 RX ADMIN — Medication 2 PUFF: at 19:44

## 2024-09-20 RX ADMIN — PREDNISOLONE ACETATE 2 DROP: 10 SUSPENSION/ DROPS OPHTHALMIC at 18:10

## 2024-09-20 RX ADMIN — HEPARIN SODIUM 5000 UNITS: 5000 INJECTION INTRAVENOUS; SUBCUTANEOUS at 09:09

## 2024-09-20 RX ADMIN — FLUTICASONE PROPIONATE 1 SPRAY: 50 SPRAY, METERED NASAL at 09:09

## 2024-09-20 RX ADMIN — ONDANSETRON 4 MG: 2 INJECTION INTRAMUSCULAR; INTRAVENOUS at 21:45

## 2024-09-20 ASSESSMENT — PAIN SCALES - GENERAL: PAINLEVEL_OUTOF10: 0

## 2024-09-20 NOTE — PROGRESS NOTES
Occupational Therapy  Facility/Department: [facility] - MED SURG/ORTHO  Daily Treatment Note  NAME: Treasure Fatima  : 1945  MRN: [id number]    Date of Service: 2024    Discharge Recommendations:  Subacute/Skilled Nursing Facility       Therapy discharge recommendations are subject to collaboration from the patient’s interdisciplinary healthcare team, including MD and case management recommendations.  Barriers to Home Discharge:   [x] Steps to access home entry or bed/bath   [x] Unable to transfer, ambulate, or propel wheelchair household distances without assist   [] Limited available assist at home upon discharge    [] Patient or family requests d/c to post-acute facility    [x] Poor cognition/safety awareness   [x] Unable to maintain ordered weight bearing status    [] Patient with salient signs of long-standing immobility   [x] Decreased independence with ADLs   [x] Increased risk for falls   [] Other:     Patient Diagnosis(es): The primary encounter diagnosis was Cellulitis of left foot. A diagnosis of Abscess of left foot was also pertinent to this visit.     Assessment   Assessment: Pt demos good progress toward OT goals. Co-tx collaboration this date with PT staff to safely progress pt toward goals. Pt will have better occupational performance outcomes within a co-treatment than 1:1 session. Pt progressing to CGA-min A of 1 with RW this session, requires increased A at times to turn and navigate RW, pt demos good adherence to NWB LLE with VCs. Pt requiring grossly mod A for LB dressing and toileting tasks, demos significant difficulty managing velcro on surgical shoe due to RA. Pt functioning below her baseline, recommend SNF at d/c.   Activity Tolerance: Patient tolerated treatment well  Discharge Recommendations: Subacute/Skilled Nursing Facility     Plan  Occupational Therapy Plan  Times Per Week: 3-5x/wk    Restrictions  Restrictions/Precautions  Restrictions/Precautions: Weight Bearing;Fall  time;Adaptive equipment (VC and Min A for safety, RW, management, hand placement, controlled ascent/descent, and L leg placement)       ADL  LE Dressing: Moderate assistance  LE Dressing Skilled Clinical Factors: For brief, L surgical shoe  Toileting: Moderate assistance  Toileting Skilled Clinical Factors: Mod A for clothing mgmt. SBA for hygiene while on toilet  Functional Mobility: Contact guard assistance;Minimal assistance  Functional Mobility Skilled Clinical Factors: RW to/from bathroom for toileting, pt demos good adherence to NWB LLE, does require 1 standing rest and lightly resting LLE heel to floor with surgical shoe    OT Exercises  Exercise Treatment: 5x 2 sets chair push ups, 5 reps sit <> stand with focus on safe hand placement, NWB and posture       Safety Devices  Type of Devices: Left in chair;Chair alarm in place;Call light within reach;Nurse notified;Gait belt  Restraints  Restraints Initially in Place: No    Patient Education  Education Given To: Patient  Education Provided: Role of Therapy;Plan of Care;Transfer Training;Equipment;Precautions;Mobility Training;Fall Prevention Strategies;ADL Adaptive Strategies;Home Exercise Program  Education Provided Comments: Pt educated on weight bearing status, post-op precautions, appropriate DME, and safe mobility with AD.  Education Method: Demonstration;Verbal  Barriers to Learning: None  Education Outcome: Verbalized understanding;Demonstrated understanding;Continued education needed    Goals  Short Term Goals  Time Frame for Short Term Goals: 1 week (9/26) unless noted-- goals ongoing 9/20/24  Short Term Goal 1: Perform functional transfers with SBA and LRAD in compliance with L heel WB  Short Term Goal 2: Perform toileting with SBA  Short Term Goal 3: Perform LE dressing with min A by 9/24  Patient Goals   Patient goals : \"Go home\"    AM-PAC - ADL  AM-PAC Daily Activity - Inpatient   How much help is needed for putting on and taking off regular lower

## 2024-09-20 NOTE — PROGRESS NOTES
Comprehensive Nutrition Assessment    Type and Reason for Visit:  Reassess    Nutrition Recommendations/Plan:   Continue regular diet and encourage PO intake   RD to add glucerna w/ meals   RD to order new updated weight   Monitor nutrition adequacy, pertinent labs, bowel habits, wt changes, and clinical progress     Malnutrition Assessment:  Malnutrition Status:  Severe malnutrition (09/18/24 1418)    Context:  Chronic Illness     Findings of the 6 clinical characteristics of malnutrition:  Energy Intake:  75% or less estimated energy requirements for 1 month or longer   Body Fat Loss:  Mild body fat loss     Muscle Mass Loss:  Severe muscle mass loss Clavicles (pectoralis & deltoids), Temples (temporalis)  Fluid Accumulation:  Mild Extremities    Nutrition Assessment:    Follow up: S/p I&D w/ metatarsal bone resection on 9/18. On regular diet, PO intakes % of meals. Pt reports good appetite over the past few days. Decreased appetite this morning d/t not feeling well and nausea. GRACIELA wt changes d/t stated weight, will order new updated weight. RD to add glucerna w/ meals- pt eager to try. Encouraged pt to have drinks inbetween meals, pt compliant. Encouraged protein intake. Continue to encourage PO intake, will continue to monitor.    Nutrition Related Findings:    LLE + 1 non-pitting edema. Labs reviewed. Reports BM 2 days ago.  today. Wound Type: Surgical Incision       Current Nutrition Intake & Therapies:    Average Meal Intake: %, 51-75%  Average Supplements Intake: None Ordered  ADULT DIET; Regular  ADULT ORAL NUTRITION SUPPLEMENT; Breakfast, Dinner, Lunch; Standard High Calorie/High Protein Oral Supplement    Anthropometric Measures:  Height: 162.6 cm (5' 4.02\")  Ideal Body Weight (IBW): 120 lbs (55 kg)       Current Body Weight: 40.4 kg (89 lb), 74.2 % IBW. Weight Source: Stated  Current BMI (kg/m2): 15.3        Weight Adjustment For: No Adjustment                 BMI Categories:  Underweight (BMI less than 22) age over 65    Estimated Daily Nutrient Needs:  Energy Requirements Based On: Kcal/kg (30-35 kcals/kg)  Weight Used for Energy Requirements: Current (41 kg)  Energy (kcal/day): 8059-9912  Weight Used for Protein Requirements: Current (1.2-1.6 g/kg)  Protein (g/day): 49-66 g  Method Used for Fluid Requirements: 1 ml/kcal  Fluid (ml/day):      Nutrition Diagnosis:   Severe malnutrition, In context of chronic illness related to inadequate protein-energy intake as evidenced by poor intake prior to admission, Criteria as identified in malnutrition assessment, severe muscle loss, mild loss of subcutaneous fat, BMI, weight loss    Nutrition Interventions:   Food and/or Nutrient Delivery: Continue Current Diet, Continue Oral Nutrition Supplement  Nutrition Education/Counseling: Education not appropriate  Coordination of Nutrition Care: Continue to monitor while inpatient       Goals:  Previous Goal Met: Progressing toward Goal(s)  Goals: Meet at least 75% of estimated needs, prior to discharge       Nutrition Monitoring and Evaluation:   Behavioral-Environmental Outcomes: None Identified  Food/Nutrient Intake Outcomes: Food and Nutrient Intake, Supplement Intake  Physical Signs/Symptoms Outcomes: Weight, Nutrition Focused Physical Findings, Biochemical Data    Discharge Planning:    Continue current diet, Continue Oral Nutrition Supplement     Amalia Funk, MS, RD, LD  Contact: Office: [phone]; Sabael: [phone]

## 2024-09-20 NOTE — PROGRESS NOTES
ID    Path on the bone is still pending   The bone culture is negative to date while the surgical tissue culture is positive with MSSA     She remains AF   On RA     -continue ancef pending final culture and path   -daily BMP, creatinine is higher today     VARUN DENNEY MD

## 2024-09-20 NOTE — PROGRESS NOTES
Mobility Training: Yes  Overall Level of Assistance: Stand-by assistance  Interventions: Safety awareness training;Verbal cues  Supine to Sit: Stand-by assistance  Scooting: Stand-by assistance  Balance  Sitting: Impaired  Sitting - Static: Good (unsupported)  Sitting - Dynamic: Good (unsupported)  Standing: Impaired  Standing - Static: Constant support;Fair  Standing - Dynamic: Constant support;Fair  Transfer Training  Transfer Training: Yes  Overall Level of Assistance: Contact-guard assistance;Minimum assistance  Interventions: Safety awareness training;Verbal cues  Sit to Stand: Contact-guard assistance;Minimum assistance;Additional time;Adaptive equipment (VC and Min A for safety, RW, management, hand placement, controlled ascent/descent, and L leg placement)  Stand to Sit: Contact-guard assistance;Minimum assistance;Additional time;Adaptive equipment (VC and Min A for safety, RW, management, hand placement, controlled ascent/descent, and L leg placement)  Toilet Transfer: Minimum assistance;Assist X1;Additional time;Adaptive equipment (VC and Min A for safety, RW, management, hand placement, controlled ascent/descent, and L leg placement)  Gait Training  Left Side Weight Bearing: Heel;Non-weight bearing (Heel for SPT, NWBT for ambulation)  Gait  Gait Training: Yes  Left Side Weight Bearing: Heel;Non-weight bearing (Heel for SPT, NWBT for ambulation)  Distance (ft): 20 Feet (20' + 10' + 10')  Assistive Device: Walker, rolling  Interventions: Safety awareness training;Verbal cues (VC for step length, RW proximity, sequencing)  Base of Support: Narrowed  Speed/Love: Fluctuations  Step Length: Right shortened  Stance: Right increased  Gait Abnormalities: Step to gait;Other (comment) (Hop to gait)     PT Exercises  Exercise Treatment: BLE Seated exercise: Marching, LAQ's, 20x, Heel/toe raises RLE, STSx5     Safety Devices  Type of Devices: Left in chair;Chair alarm in place;Call light within reach;Nurse  notified;Gait belt  Restraints  Restraints Initially in Place: No      Goals  Short Term Goals  Time Frame for Short Term Goals: 9/26/24, 9/20 continue goals  Short Term Goal 1: Pt will complete bed mobility with supervision  Short Term Goal 2: pt will complete all transfers with SBA  Short Term Goal 3: pt will navigate to the bathroom via knee scooter, WC, or hop to gait pattern with CGA - Goal met 9/20, progress to 50' w/ CGA and RW  Short Term Goal 4: pt will participate in 10-12 reps of BLE exercises by 9/22/24- Goal met 9/10  Short Term Goal 5: Pt will navigate up<>down 1 step with mod A  Additional Goals?: No  Patient Goals   Patient Goals : \"to go home\"    Education  Patient Education  Education Given To: Patient  Education Provided: Role of Therapy;Plan of Care;Equipment;Transfer Training;Precautions  Education Provided Comments: Disease Specific Education: Pt educated on weight bearing status, post-op precautions, appropriate DME, and safe mobility with AD.  Education Method: Verbal;Demonstration  Barriers to Learning: None  Education Outcome: Verbalized understanding;Demonstrated understanding;Continued education needed    AM-PAC - Mobility    AM-PAC Basic Mobility - Inpatient   How much help is needed turning from your back to your side while in a flat bed without using bedrails?: None  How much help is needed moving from lying on your back to sitting on the side of a flat bed without using bedrails?: None  How much help is needed moving to and from a bed to a chair?: A Little  How much help is needed standing up from a chair using your arms?: A Little  How much help is needed walking in hospital room?: A Little  How much help is needed climbing 3-5 steps with a railing?: A Lot  AM-PAC Inpatient Mobility Raw Score : 19  AM-PAC Inpatient T-Scale Score : 45.44  Mobility Inpatient CMS 0-100% Score: 41.77  Mobility Inpatient CMS G-Code Modifier : CK         Therapy Time   Individual Concurrent Group

## 2024-09-20 NOTE — CARE COORDINATION
Chart reviewed day 3. Pt is followed by IM and ID. Bone path pending, receiving IV abx and elevated creatinine noted. Plan to return home w/grndtr. [facility] following for needs. Will follow for needs as they arise. Electronically signed by MARLENY SHEPARD RN on 9/20/2024 at 2:28 PM

## 2024-09-20 NOTE — DISCHARGE INSTR - COC
Continuity of Care Form    Patient Name: Treasure Fatima   :  1945  MRN:  [id number]    Admit date:  2024  Discharge date:  2024    Code Status Order: Full Code   Advance Directives:   Advance Care Flowsheet Documentation             Admitting Physician:  Gian Castro MD  PCP: Keisha Tsang MD    Discharging Nurse: Cierra Montes RN  Discharging Hospital Unit/Room#: 0535/0535-01  Discharging Unit Phone Number: [phone]    Emergency Contact:   Extended Emergency Contact Information  Primary Emergency Contact: ABHIJEET LACY  Mobile Phone: [phone]  Relation: Daughter-in-Law  Secondary Emergency Contact: ERNESTINA REYNA  Mobile Phone: [phone]  Relation: None    Past Surgical History:  Past Surgical History:   Procedure Laterality Date    COLONOSCOPY N/A 3/12/2021    COLONOSCOPY DIAGNOSTIC performed by Niko Farley MD at [facility] ENDOSCOPY    FOOT DEBRIDEMENT Left 2024    LEFT FOOT DEBRIDEMENT INCISION AND DRAINAGE WITH PARTIAL FIFTH RAY AMPUTATION. performed by Lelo Montilla MD at [facility] OR    HYSTERECTOMY (CERVIX STATUS UNKNOWN)      UPPER GASTROINTESTINAL ENDOSCOPY N/A 3/12/2021    EGD BAND LIGATION performed by Niko Farley MD at [facility] ENDOSCOPY    UPPER GASTROINTESTINAL ENDOSCOPY N/A 2021    EGD BAND LIGATION performed by Violet Kelly MD at [facility] ENDOSCOPY    UPPER GASTROINTESTINAL ENDOSCOPY N/A 2021    EGD performed by Violet Kelly MD at [facility] ENDOSCOPY    UPPER GASTROINTESTINAL ENDOSCOPY  2021    EGD BAND LIGATION performed by Violet Kelly MD at [facility] ENDOSCOPY    UPPER GASTROINTESTINAL ENDOSCOPY N/A 2022    EGD DIAGNOSTIC ONLY performed by Violet Kelly MD at [facility] ENDOSCOPY    UPPER GASTROINTESTINAL ENDOSCOPY  2022    EGD FOREIGN BODY REMOVAL performed by Violet Kelly MD at [facility] ENDOSCOPY    UPPER GASTROINTESTINAL ENDOSCOPY N/A 2023    EGD performed by Gian Gonsales MD at [facility] ENDOSCOPY    UPPER  Assessment Other (Comment) 09/20/24 1035   Drainage Amount None (dry) 09/20/24 1035   Odor None 09/20/24 1035   Number of days: 2        Elimination:  Continence:   Bowel: Yes  Bladder: Yes  Urinary Catheter: None   Colostomy/Ileostomy/Ileal Conduit: No       Date of Last BM: 9/19/2024    Intake/Output Summary (Last 24 hours) at 9/20/2024 1242  Last data filed at 9/20/2024 0645  Gross per 24 hour   Intake 180 ml   Output 1300 ml   Net -1120 ml     I/O last 3 completed shifts:  In: 180 [P.O.:180]  Out: 2725 [Urine:2725]    Safety Concerns:     At Risk for Falls    Impairments/Disabilities:      None    Nutrition Therapy:  Current Nutrition Therapy:   - Oral Diet:  General    Routes of Feeding: Oral  Liquids: Thin Liquids  Daily Fluid Restriction: no  Last Modified Barium Swallow with Video (Video Swallowing Test): not done    Treatments at the Time of Hospital Discharge:   Respiratory Treatments:   Oxygen Therapy:  is not on home oxygen therapy.  Ventilator:    - No ventilator support    Rehab Therapies: Physical Therapy and Occupational Therapy  Weight Bearing Status/Restrictions: Touchdown weight bearing (10-25 lbs) only on left leg boot on, support weight  Other Medical Equipment (for information only, NOT a DME order):  walker  Other Treatments: boot    Patient's personal belongings (please select all that are sent with patient):  Dentures upper    RN SIGNATURE:  Electronically signed by Cierra Montes RN on 9/22/24 at 4:47 PM EDT    CASE MANAGEMENT/SOCIAL WORK SECTION    Inpatient Status Date: 9/17/24      Readmission Risk Assessment Score:  Readmission Risk              Risk of Unplanned Readmission:  14           Discharging to Facility/ Agency   Name: [facility]  Address:  Phone: [phone]  Fax:[phone]    Dialysis Facility (if applicable)   Name:  Address:  Dialysis Schedule:  Phone:  Fax:    / signature: Electronically signed by Aleisha Darby RN on 9/22/24 at

## 2024-09-20 NOTE — PROGRESS NOTES
Hospital Medicine Progress Note      Date of Admission: 9/17/2024  Hospital Day: 4    Chief Admission Complaint:  \"L foot infection\"     Subjective:  no new c/o    Presenting Admission History:         \"79 [facility]. [facility].  Underwent local I&D in ED and placed on ABX.  She is well known to Podiatry - consulted from ED and appreciated in advance.      The patient denies any fever/chills or other signs/sxs of systemic illness or identifiable aggravating/alleviating factors\"    Assessment/Plan:      Current Principal Problem:  Cellulitis      Cellulitis and abscess of L foot:  s/p I&D in ED.  Podiatry consulted s/p I&D w/ metatarsal bone resection 18 Sept w/out complications.  Continued Vanco/Cefepime initially, changed to Cefazolin per ID - consulted and appreciated.  Discharge ABX recs to follow.  Closely following and personally reviewed serial labs as documented in this note for evidence of ABX (Vanco) induced nephrotoxicity.       PBC - controlled on home Actigall - continued.  No acute issues and GI NOT YET consulted.      COPD - w/out chronic hypoxic respiratory failure on no baseline home O2.  Controlled on home medication regimen - continued.      HypoThyroid - clinically euthyroid on oral replacement therapy. Continue, w/ outpt monitoring as previously arranged.     Physical Exam Performed:      General appearance:  No apparent distress  Respiratory:  Normal respiratory effort.   Cardiovascular:  Regular rate and rhythm.  Abdomen:  Soft, non-tender, non-distended.  Musculoskelatal:  No edema  Neurologic:  Non-focal  Psychiatric:  Alert and oriented    BP (!) 108/55   Pulse 84   Temp 98.4 °F (36.9 °C) (Oral)   Resp 18   Ht 1.626 m (5' 4.02\")   Wt 40.4 kg (89 lb)   SpO2 97%   BMI 15.27 kg/m²     Diet: ADULT DIET; Regular    DVT Prophylaxis: []PPx LMWH  [x]SQ Heparin  []IPC/SCDs  []Eliquis  []Xarelto  []Coumadin  []Other -      Code  close serial monitoring of electrolytes and fluid status  [] Other -  [] Change in code status:    [] Decision to escalate care:    [] Major surgery/procedure with associated risk factors:    ----------------------------------------------------------------------  C. Data (any 2)  [] Discussed management of the case with consultants as follows:    [x] Discussed the discharge plan in detail with case mgt including timing/barriers to discharge, need for support services and placement decision   [] Imaging personally reviewed and interpreted, includes:   [] Telemetry monitoring as noted above  [x] Data Review (any 3)  [] Collateral history obtained from:    [x] All available Consultant notes from yesterday/today were reviewed  [x] All current labs were reviewed and interpreted for clinical significance   [x] Appropriate follow-up labs were ordered    Medications:  Personally reviewed in detail in conjunction w/ labs as documented for evidence of drug toxicity.     Infusion Medications    sodium chloride 25 mL (09/20/24 0337)     Scheduled Medications    ceFAZolin  2,000 mg IntraVENous Q12H    fluticasone  1 spray Nasal Daily    levothyroxine  50 mcg Oral Daily    prednisoLONE acetate  2 drop Both Eyes 4x Daily    tiotropium  2 puff Inhalation Daily RT    ursodiol  750 mg Oral Daily    sodium chloride flush  5-40 mL IntraVENous 2 times per day    heparin (porcine)  5,000 Units SubCUTAneous BID    budesonide-formoterol  2 puff Inhalation BID RT     PRN Meds: oxyCODONE, albuterol sulfate HFA, sodium chloride flush, sodium chloride, ondansetron **OR** ondansetron, polyethylene glycol, acetaminophen **OR** acetaminophen     Labs:  Personally reviewed and interpreted for clinical significance.     Recent Labs     09/17/24  1312 09/18/24  0543 09/20/24  0436   WBC 7.5 6.0 7.8   HGB 12.2 10.1* 10.3*   HCT 37.1 30.4* 31.0*   * 119* 117*     Recent Labs     09/17/24  1312 09/18/24  0543 09/20/24  0436   * 137 137   K

## 2024-09-21 LAB
ALBUMIN SERPL-MCNC: 2.8 G/DL (ref 3.4–5)
ANION GAP SERPL CALCULATED.3IONS-SCNC: 10 MMOL/L (ref 3–16)
BACTERIA BLD CULT ORG #2: NORMAL
BACTERIA BLD CULT: NORMAL
BUN SERPL-MCNC: 28 MG/DL (ref 7–20)
CALCIUM SERPL-MCNC: 9.1 MG/DL (ref 8.3–10.6)
CHLORIDE SERPL-SCNC: 107 MMOL/L (ref 99–110)
CO2 SERPL-SCNC: 18 MMOL/L (ref 21–32)
CREAT SERPL-MCNC: 1.4 MG/DL (ref 0.6–1.2)
DEPRECATED RDW RBC AUTO: 15 % (ref 12.4–15.4)
GFR SERPLBLD CREATININE-BSD FMLA CKD-EPI: 38 ML/MIN/{1.73_M2}
GLUCOSE SERPL-MCNC: 156 MG/DL (ref 70–99)
HCT VFR BLD AUTO: 33.8 % (ref 36–48)
HGB BLD-MCNC: 10.9 G/DL (ref 12–16)
MCH RBC QN AUTO: 29.6 PG (ref 26–34)
MCHC RBC AUTO-ENTMCNC: 32.1 G/DL (ref 31–36)
MCV RBC AUTO: 92.3 FL (ref 80–100)
PHOSPHATE SERPL-MCNC: 3 MG/DL (ref 2.5–4.9)
PLATELET # BLD AUTO: 119 K/UL (ref 135–450)
PMV BLD AUTO: 8.2 FL (ref 5–10.5)
POTASSIUM SERPL-SCNC: 4.3 MMOL/L (ref 3.5–5.1)
RBC # BLD AUTO: 3.66 M/UL (ref 4–5.2)
SODIUM SERPL-SCNC: 135 MMOL/L (ref 136–145)
WBC # BLD AUTO: 7.4 K/UL (ref 4–11)

## 2024-09-21 PROCEDURE — 6360000002 HC RX W HCPCS: Performed by: STUDENT IN AN ORGANIZED HEALTH CARE EDUCATION/TRAINING PROGRAM

## 2024-09-21 PROCEDURE — 1200000000 HC SEMI PRIVATE

## 2024-09-21 PROCEDURE — 6370000000 HC RX 637 (ALT 250 FOR IP): Performed by: STUDENT IN AN ORGANIZED HEALTH CARE EDUCATION/TRAINING PROGRAM

## 2024-09-21 PROCEDURE — 80069 RENAL FUNCTION PANEL: CPT

## 2024-09-21 PROCEDURE — 2580000003 HC RX 258: Performed by: STUDENT IN AN ORGANIZED HEALTH CARE EDUCATION/TRAINING PROGRAM

## 2024-09-21 PROCEDURE — 36415 COLL VENOUS BLD VENIPUNCTURE: CPT

## 2024-09-21 PROCEDURE — 6360000002 HC RX W HCPCS: Performed by: INTERNAL MEDICINE

## 2024-09-21 PROCEDURE — 94640 AIRWAY INHALATION TREATMENT: CPT

## 2024-09-21 PROCEDURE — 85027 COMPLETE CBC AUTOMATED: CPT

## 2024-09-21 RX ADMIN — FLUTICASONE PROPIONATE 1 SPRAY: 50 SPRAY, METERED NASAL at 09:01

## 2024-09-21 RX ADMIN — Medication 10 ML: at 09:02

## 2024-09-21 RX ADMIN — Medication 2000 MG: at 15:58

## 2024-09-21 RX ADMIN — Medication 2 PUFF: at 19:21

## 2024-09-21 RX ADMIN — Medication 2000 MG: at 04:27

## 2024-09-21 RX ADMIN — Medication 10 ML: at 21:27

## 2024-09-21 RX ADMIN — PREDNISOLONE ACETATE 2 DROP: 10 SUSPENSION/ DROPS OPHTHALMIC at 15:58

## 2024-09-21 RX ADMIN — SODIUM CHLORIDE 25 ML: 9 INJECTION, SOLUTION INTRAVENOUS at 04:27

## 2024-09-21 RX ADMIN — PREDNISOLONE ACETATE 2 DROP: 10 SUSPENSION/ DROPS OPHTHALMIC at 09:01

## 2024-09-21 RX ADMIN — Medication 2 PUFF: at 07:37

## 2024-09-21 RX ADMIN — HEPARIN SODIUM 5000 UNITS: 5000 INJECTION INTRAVENOUS; SUBCUTANEOUS at 09:01

## 2024-09-21 RX ADMIN — URSODIOL 750 MG: 500 TABLET, FILM COATED ORAL at 09:01

## 2024-09-21 RX ADMIN — TIOTROPIUM BROMIDE INHALATION SPRAY 2 PUFF: 3.12 SPRAY, METERED RESPIRATORY (INHALATION) at 07:36

## 2024-09-21 RX ADMIN — LEVOTHYROXINE SODIUM 50 MCG: 0.05 TABLET ORAL at 04:27

## 2024-09-21 RX ADMIN — PREDNISOLONE ACETATE 2 DROP: 10 SUSPENSION/ DROPS OPHTHALMIC at 12:55

## 2024-09-21 RX ADMIN — HEPARIN SODIUM 5000 UNITS: 5000 INJECTION INTRAVENOUS; SUBCUTANEOUS at 21:27

## 2024-09-21 ASSESSMENT — PAIN DESCRIPTION - FREQUENCY: FREQUENCY: CONTINUOUS

## 2024-09-21 ASSESSMENT — PAIN DESCRIPTION - ONSET: ONSET: ON-GOING

## 2024-09-21 ASSESSMENT — PAIN SCALES - GENERAL
PAINLEVEL_OUTOF10: 3
PAINLEVEL_OUTOF10: 5
PAINLEVEL_OUTOF10: 5

## 2024-09-21 ASSESSMENT — PAIN DESCRIPTION - PAIN TYPE: TYPE: SURGICAL PAIN;ACUTE PAIN

## 2024-09-21 ASSESSMENT — PAIN DESCRIPTION - LOCATION: LOCATION: FOOT

## 2024-09-21 ASSESSMENT — PAIN DESCRIPTION - DESCRIPTORS: DESCRIPTORS: SORE

## 2024-09-21 ASSESSMENT — PAIN DESCRIPTION - ORIENTATION: ORIENTATION: LEFT

## 2024-09-21 NOTE — DISCHARGE SUMMARY
Hospitalist Discharge Summary/Progress Note      Name:  Treasure Fatima /Age/Sex: 1945 (79 y.o. female)   Admit Date: 2024  Discharge Date: 24    MRN & CSN:  [id number] & [id number] Encounter Date and Time 24 11:06 AM EDT    Attending:  Diane Keith MD Discharging Provider: Diane Keith MD       Disposition: home with home care    Admission Diagnoses:   Patient Active Problem List   Diagnosis    CAP (community acquired pneumonia)    PNA (pneumonia)    History of tobacco abuse    Sepsis (HCC)    Acute upper GI bleed    Acute GI bleeding    Cellulitis of fifth toe of left foot    Cellulitis    Severe malnutrition (HCC)       Discharge Diagnoses: Principal Problem:    Cellulitis  Active Problems:    Cellulitis of fifth toe of left foot    Severe malnutrition (HCC)  Resolved Problems:    * No resolved hospital problems. *      Code Status:  Full Code    Condition:  Stable    Discharge Diet: Diet:  ADULT DIET; Regular  ADULT ORAL NUTRITION SUPPLEMENT; Breakfast, Dinner, Lunch; Diabetic Oral Supplement    PCP to do list:  pt to follow with podiatry for wound healing    No future appointments.     Hospital Course: 79 y.o. female with PMHX sig for primary biliary cirrhosis, hypothyroidism, COPD, GERD, and OA  who presented to [facility] with progressive L foot swelling and pain. Underwent local I&D in ED and placed on ABX. She is well known to Podiatry.  s/p I&D in ED.  Podiatry consulted s/p I&D w/ metatarsal bone resection 18 Sept w/out complications.  Continued Vanco/Cefepime initially, changed to Cefazolin per ID.    Plan is for patient to go home and family will care for her.  They are declining SNF.  [facility].  Plan is for short course of oral abx only.    This patient meets with criteria for  severe  malnutrution based on a BMI of  Body mass index is 15.27 kg/m²..  This malnutrition is likely due to COPD/asthma and non-alcoholic

## 2024-09-21 NOTE — CARE COORDINATION
Called pt room with no answer, called and left vm for grd dtr re: recs for SNF.     Addendum: Spoke to pt grd dtr who declines SNF, states she feels they can manage at home and would like to resume with [facility]. Will need HHC orders and ecoc.

## 2024-09-21 NOTE — PROGRESS NOTES
ID    Bone and tissue cultures of the resected portion of the met head are positive with MSSA  The path on the bone was negative for OM   No clearance fragment sent     To determine abx plan for DC, will need to speak with Podiatry re clinical concerns for residual OM in the remaining met shaft - left a message    VARUN DENNEY MD

## 2024-09-21 NOTE — PROGRESS NOTES
Podiatric Surgery Daily Progress Note  Treasure Fatima  Subjective :   Patient seen and examined this am at the bedside. Patient denies any acute overnight events. Patient denies N/V/F/C/SOB. Patient denies calf pain, thigh pain, chest pain.        Objective     BP (!) 98/51   Pulse 83   Temp 98.5 °F (36.9 °C) (Oral)   Resp 16   Ht 1.626 m (5' 4.02\")   Wt 40.4 kg (89 lb)   SpO2 91%   BMI 15.27 kg/m²      I/O:  Intake/Output Summary (Last 24 hours) at 9/21/2024 1145  Last data filed at 9/21/2024 0902  Gross per 24 hour   Intake 10 ml   Output --   Net 10 ml              Wt Readings from Last 3 Encounters:   09/17/24 40.4 kg (89 lb)   08/18/24 40.5 kg (89 lb 3.2 oz)   08/10/23 46.7 kg (103 lb)       LABS:    Recent Labs     09/20/24  0436 09/21/24  0517   WBC 7.8 7.4   HGB 10.3* 10.9*   HCT 31.0* 33.8*   * 119*        Recent Labs     09/21/24  0517   *   K 4.3      CO2 18*   PHOS 3.0   BUN 28*   CREATININE 1.4*      No results for input(s): \"INR\", \"APTT\" in the last 72 hours.    Invalid input(s): \"PROT\"        LOWER EXTREMITY EXAMINATION    Integument:  Skin is warm, dry and supple, bilateral. Closed surgical incision to the plantar lateral left 5th MPJ w/ wound edges well coapted, all sutures intact, no SOI, no drainage. No openings in the skin on the right.         Neurologic:  Protective sensation is grossly diminished to light touch at the level of the toes, bilateral.  Vascular:  DP and PT pulses are palpable, bilateral.  CFT is brisk to all toes. Mild edema noted to the left dorsal lateral forefoot.  Musculoskeletal:  Patient has 5/5 strength on inversion/everison/dorsiflexion/plantarflexion, bilateral.  No gross musculoskeletal deformities noted.      Imaging: Left foot xray - no cortical erosions  - L foot MRI 8/18/24 - fluid collection to the 5th MT head representing possible abscess, no OM    Micro 9/17/24:   - L abscess: staph aureus     Micro 9/18/24:  - L foot deep tissue: staph

## 2024-09-21 NOTE — PLAN OF CARE
Problem: Discharge Planning  Goal: Discharge to home or other facility with appropriate resources  9/21/2024 1036 by Cierra Montes RN  Outcome: Progressing  Flowsheets (Taken 9/21/2024 0830)  Discharge to home or other facility with appropriate resources: Identify barriers to discharge with patient and caregiver       Problem: Pain  Goal: Verbalizes/displays adequate comfort level or baseline comfort level  9/21/2024 1036 by Cierra Montes RN  Outcome: Progressing  Flowsheets (Taken 9/21/2024 0845)  Verbalizes/displays adequate comfort level or baseline comfort level: Encourage patient to monitor pain and request assistance  Pt refusing pain medication at this time.

## 2024-09-22 VITALS
RESPIRATION RATE: 16 BRPM | TEMPERATURE: 98.5 F | SYSTOLIC BLOOD PRESSURE: 108 MMHG | BODY MASS INDEX: 15.19 KG/M2 | HEART RATE: 82 BPM | OXYGEN SATURATION: 91 % | DIASTOLIC BLOOD PRESSURE: 54 MMHG | HEIGHT: 64 IN | WEIGHT: 89 LBS

## 2024-09-22 LAB
BACTERIA SPEC AEROBE CULT: ABNORMAL
BACTERIA SPEC AEROBE CULT: ABNORMAL
BACTERIA SPEC ANAEROBE CULT: ABNORMAL
GRAM STN SPEC: ABNORMAL
ORGANISM: ABNORMAL
ORGANISM: ABNORMAL

## 2024-09-22 PROCEDURE — 6360000002 HC RX W HCPCS: Performed by: INTERNAL MEDICINE

## 2024-09-22 PROCEDURE — 6360000002 HC RX W HCPCS: Performed by: STUDENT IN AN ORGANIZED HEALTH CARE EDUCATION/TRAINING PROGRAM

## 2024-09-22 PROCEDURE — 6370000000 HC RX 637 (ALT 250 FOR IP): Performed by: STUDENT IN AN ORGANIZED HEALTH CARE EDUCATION/TRAINING PROGRAM

## 2024-09-22 PROCEDURE — 6370000000 HC RX 637 (ALT 250 FOR IP): Performed by: INTERNAL MEDICINE

## 2024-09-22 PROCEDURE — 94640 AIRWAY INHALATION TREATMENT: CPT

## 2024-09-22 PROCEDURE — 2580000003 HC RX 258: Performed by: STUDENT IN AN ORGANIZED HEALTH CARE EDUCATION/TRAINING PROGRAM

## 2024-09-22 RX ORDER — TRAMADOL HYDROCHLORIDE 50 MG/1
25 TABLET ORAL EVERY 6 HOURS PRN
Qty: 12 TABLET | Refills: 0 | Status: SHIPPED | OUTPATIENT
Start: 2024-09-22 | End: 2024-09-28

## 2024-09-22 RX ORDER — DOXYCYCLINE HYCLATE 100 MG
100 TABLET ORAL 2 TIMES DAILY
Qty: 14 TABLET | Refills: 0 | Status: SHIPPED | OUTPATIENT
Start: 2024-09-22 | End: 2024-09-29

## 2024-09-22 RX ORDER — TRAMADOL HYDROCHLORIDE 50 MG/1
25 TABLET ORAL EVERY 6 HOURS PRN
Status: DISCONTINUED | OUTPATIENT
Start: 2024-09-22 | End: 2024-09-22 | Stop reason: HOSPADM

## 2024-09-22 RX ORDER — TRAMADOL HYDROCHLORIDE 50 MG/1
25 TABLET ORAL ONCE
Status: COMPLETED | OUTPATIENT
Start: 2024-09-22 | End: 2024-09-22

## 2024-09-22 RX ADMIN — Medication 2 PUFF: at 07:38

## 2024-09-22 RX ADMIN — FLUTICASONE PROPIONATE 1 SPRAY: 50 SPRAY, METERED NASAL at 08:59

## 2024-09-22 RX ADMIN — ONDANSETRON 4 MG: 4 TABLET, ORALLY DISINTEGRATING ORAL at 07:51

## 2024-09-22 RX ADMIN — PREDNISOLONE ACETATE 2 DROP: 10 SUSPENSION/ DROPS OPHTHALMIC at 07:51

## 2024-09-22 RX ADMIN — HEPARIN SODIUM 5000 UNITS: 5000 INJECTION INTRAVENOUS; SUBCUTANEOUS at 09:00

## 2024-09-22 RX ADMIN — Medication 10 ML: at 07:51

## 2024-09-22 RX ADMIN — TIOTROPIUM BROMIDE INHALATION SPRAY 2 PUFF: 3.12 SPRAY, METERED RESPIRATORY (INHALATION) at 07:38

## 2024-09-22 RX ADMIN — TRAMADOL HYDROCHLORIDE 25 MG: 50 TABLET ORAL at 10:24

## 2024-09-22 RX ADMIN — Medication 2000 MG: at 16:21

## 2024-09-22 RX ADMIN — POLYETHYLENE GLYCOL 3350 17 G: 17 POWDER, FOR SOLUTION ORAL at 09:57

## 2024-09-22 RX ADMIN — PREDNISOLONE ACETATE 2 DROP: 10 SUSPENSION/ DROPS OPHTHALMIC at 16:21

## 2024-09-22 RX ADMIN — URSODIOL 750 MG: 500 TABLET, FILM COATED ORAL at 09:00

## 2024-09-22 RX ADMIN — LEVOTHYROXINE SODIUM 50 MCG: 0.05 TABLET ORAL at 05:12

## 2024-09-22 RX ADMIN — Medication 2000 MG: at 03:26

## 2024-09-22 ASSESSMENT — PAIN SCALES - GENERAL
PAINLEVEL_OUTOF10: 6
PAINLEVEL_OUTOF10: 2

## 2024-09-22 ASSESSMENT — PAIN DESCRIPTION - ORIENTATION: ORIENTATION: LEFT

## 2024-09-22 ASSESSMENT — PAIN DESCRIPTION - LOCATION: LOCATION: FOOT

## 2024-09-22 ASSESSMENT — PAIN DESCRIPTION - DESCRIPTORS: DESCRIPTORS: ACHING;THROBBING

## 2024-09-22 ASSESSMENT — PAIN - FUNCTIONAL ASSESSMENT: PAIN_FUNCTIONAL_ASSESSMENT: ACTIVITIES ARE NOT PREVENTED

## 2024-09-22 NOTE — FLOWSHEET NOTE
09/21/24 2126   Vital Signs   Temp 98.3 °F (36.8 °C)   Pulse (!) 101   Heart Rate Source Monitor   Respirations 16   /63   MAP (Calculated) 75   MAP (mmHg) 75   Pain Assessment   Pain Assessment 0-10   Pain Level 5   Pain Location Foot   Pain Orientation Left   Pain Descriptors Sore   Pain Type Surgical pain;Acute pain   Pain Frequency Continuous   Pain Onset On-going   Non-Pharmaceutical Pain Intervention(s) Other (Comment)  (declines any intercention even meds)   Oxygen Therapy   SpO2 91 %   O2 Device None (Room air)     PM assessment complete. Medications given as ordered. Vital signs stable. Pt rates pain 5/10 but declines any medication or any non pharmaceutical intervention. Educated to alert RN if pain increases or becomes intolerable. Neurovasc intact to LLE, ace wrap intact, no drainage. Cap refill to toes brisk. Popliteal pulse +2. Plan of care reviewed. No other needs identified at this time. Bed alarm set. Call light within reach.

## 2024-09-22 NOTE — DISCHARGE SUMMARY
Pt discharge home with granddaughter. All belongings sent with pt. Discharge and follow up instructions explain. Medications sent to preferred pharmacy. Pt denies any further questions at this time.

## 2024-09-22 NOTE — PLAN OF CARE
Problem: Discharge Planning  Goal: Discharge to home or other facility with appropriate resources  9/22/2024 1109 by Cierra Montes, RN  Outcome: Progressing  Flowsheets (Taken 9/22/2024 0830)  Discharge to home or other facility with appropriate resources: Identify barriers to discharge with patient and caregiver       Problem: Pain  Goal: Verbalizes/displays adequate comfort level or baseline comfort level  Recent Flowsheet Documentation  Taken 9/22/2024 0745 by Cierra Montes, RN  Verbalizes/displays adequate comfort level or baseline comfort level: Encourage patient to monitor pain and request assistance  9/21/2024 2332 by Lexy Becerra, RN  Outcome: Progressing

## 2024-09-22 NOTE — CARE COORDINATION
CASE MANAGEMENT DISCHARGE SUMMARY      Discharge to: home with [facility]    Precertification completed:   Hospital Exemption Notification (HENS) completed:     IMM given: (date)     New Durable Medical Equipment ordered/agency: na    Transportation:    Family/car: private    Confirmed discharge plan with: RNSTACEY     Patient: yes/no     Family:  yes/no    Name: Contact number:     Facility/Agency, name:  [facility] faxed [phone]   Phone number for report to facility:      RN, name: Cierra    Note: Discharging nurse to complete [facility], reconcile [facility], and place final copy with patient's discharge packet. RN to ensure that written prescriptions for  Level II medications are sent with patient to the facility as per protocol.

## 2024-09-22 NOTE — DISCHARGE SUMMARY
Hospitalist Discharge Summary      Name:  Treasure Fatima /Age/Sex: 1945 (79 y.o. female)   Admit Date: 2024  Discharge Date: 24    MRN & CSN:  [id number] & [id number] Encounter Date and Time 24 09:15 AM EDT    Attending:  Diane Keith MD Discharging Provider: Diane Keith MD       Disposition: home with home care    Admission Diagnoses:   Patient Active Problem List   Diagnosis    CAP (community acquired pneumonia)    PNA (pneumonia)    History of tobacco abuse    Sepsis (HCC)    Acute upper GI bleed    Acute GI bleeding    Cellulitis of fifth toe of left foot    Cellulitis    Severe malnutrition (HCC)       Discharge Diagnoses: Principal Problem:    Cellulitis  Active Problems:    Cellulitis of fifth toe of left foot    Severe malnutrition (HCC)  Resolved Problems:    * No resolved hospital problems. *      Code Status:  Full Code    Condition:  Stable    Discharge Diet: Diet:  ADULT DIET; Regular  ADULT ORAL NUTRITION SUPPLEMENT; Breakfast, Dinner, Lunch; Diabetic Oral Supplement    PCP to do list:  pt to follow with podiatry for wound healing    No future appointments.     Hospital Course: 79 y.o. female with PMHX sig for primary biliary cirrhosis, hypothyroidism, COPD, GERD, and OA  who presented to [facility] with progressive L foot swelling and pain. Underwent local I&D in ED and placed on ABX. She is well known to Podiatry.  s/p I&D in ED.  Podiatry consulted s/p I&D w/ metatarsal bone resection 18 Sept w/out complications.  Continued Vanco/Cefepime initially, changed to Cefazolin per ID.    Plan is for patient to go home and family will care for her.  They are declining SNF.  [facility].  Plan is for short course of oral abx only.  Dr. Hale would like doxycycline for an additional 7 days.  PT did not tolerate oral narcotics due to side effects, but she does tolerate tramadol, so we will send that script in to  foot   Post-Op Diagnosis: Same       Procedure:   Incision and drainage below fascia, left foot  Metatarsal bone resection, left foot   Findings: mild necrotic subcutaneous tissue, fat, and deep fascia to the plantar 5th MT head, all excised; no formal deep space abscess; 5th MT head devitalized in appearance, resected, and sent for C&S/path; good vascular perfusion     Assessment on Discharge: Stable, improved     Discharge ROS:  A complete review of systems was asked and negative except for some ongoing mild pain in L foot    Discharge Exam:  BP (!) 108/54   Pulse 82   Temp 98.5 °F (36.9 °C) (Oral)   Resp 16   Ht 1.626 m (5' 4.02\")   Wt 40.4 kg (89 lb)   SpO2 91%   BMI 15.27 kg/m²     Gen: thin, alert, cooperative  HEENT: NC/AT, moist mucous membranes, no oropharyngeal erythema or exudate  Neck: supple, trachea midline, no anterior cervical or SC LAD  Heart:  normal s1/s2, reg rate and rhythm, no murmurs, no gallops, no rubs. No R leg edema  Lungs: clear to auscultation bilaterally- no wheezes, rales or rhonchi, normal air movement, no respiratory distress  Abd: soft, non-tender, non-distended, normal bowel sounds, no masses or organomegaly  Extrem:  no ulcers, no Enoc's sign, and no R leg edema  Skin: Warm  Psych: A & O x3  Neuro: grossly intact, moves all 4 extremities    Pertinent Studies During Hospital Stay:  Radiology:  XR FOOT LEFT (MIN 3 VIEWS)    Result Date: 9/18/2024  EXAMINATION: THREE XRAY VIEWS OF THE LEFT FOOT 9/18/2024 5:49 pm COMPARISON: None. HISTORY: ORDERING SYSTEM PROVIDED HISTORY: Post-Op TECHNOLOGIST PROVIDED HISTORY: Reason for exam:->Post-Op Reason for Exam: post op left foot FINDINGS: The patient has undergone amputation of the 5th metatarsal head.  The amputation margin is smooth.  Remaining osseous structures appear normal.     Status post amputation of the 5th metatarsal head.  Amputation margin appears smooth.  No destructive osseous changes or fracture.     XR FOOT LEFT (MIN

## 2024-09-22 NOTE — PLAN OF CARE
Problem: Discharge Planning  Goal: Discharge to home or other facility with appropriate resources  9/22/2024 1644 by Cierra Montes, MARA  Outcome: Adequate for Discharge  9/22/2024 1109 by Cierra Montes RN  Outcome: Progressing  Flowsheets (Taken 9/22/2024 0830)  Discharge to home or other facility with appropriate resources: Identify barriers to discharge with patient and caregiver     Problem: Pain  Goal: Verbalizes/displays adequate comfort level or baseline comfort level  Outcome: Adequate for Discharge  Flowsheets (Taken 9/22/2024 0745)  Verbalizes/displays adequate comfort level or baseline comfort level: Encourage patient to monitor pain and request assistance     Problem: Safety - Adult  Goal: Free from fall injury  Outcome: Adequate for Discharge     Problem: Nutrition Deficit:  Goal: Optimize nutritional status  Outcome: Adequate for Discharge     Problem: ABCDS Injury Assessment  Goal: Absence of physical injury  Outcome: Adequate for Discharge     Problem: Skin/Tissue Integrity  Goal: Absence of new skin breakdown  Description: 1.  Monitor for areas of redness and/or skin breakdown  2.  Assess vascular access sites hourly  3.  Every 4-6 hours minimum:  Change oxygen saturation probe site  4.  Every 4-6 hours:  If on nasal continuous positive airway pressure, respiratory therapy assess nares and determine need for appliance change or resting period.  Outcome: Adequate for Discharge

## 2024-09-22 NOTE — PLAN OF CARE
Problem: Discharge Planning  Goal: Discharge to home or other facility with appropriate resources  9/21/2024 2332 by Lexy Becerra RN  Outcome: Progressing  9/21/2024 1036 by Cierra Montes RN  Outcome: Progressing  Flowsheets (Taken 9/21/2024 0830)  Discharge to home or other facility with appropriate resources: Identify barriers to discharge with patient and caregiver     Problem: Pain  Goal: Verbalizes/displays adequate comfort level or baseline comfort level  9/21/2024 2332 by Lexy Becerra RN  Outcome: Progressing  9/21/2024 1036 by Cierra Montes RN  Outcome: Progressing  Flowsheets (Taken 9/21/2024 0845)  Verbalizes/displays adequate comfort level or baseline comfort level: Encourage patient to monitor pain and request assistance     Problem: Safety - Adult  Goal: Free from fall injury  Outcome: Progressing

## 2024-09-22 NOTE — PROGRESS NOTES
Hospitalist Discharge Summary/Progress Note      Name:  Treasure Fatima /Age/Sex: 1945 (79 y.o. female)   Admit Date: 2024  Discharge Date: 24    MRN & CSN:  [id number] & [id number] Encounter Date and Time 24 11:06 AM EDT    Attending:  Diane Keith MD Discharging Provider: Diane Keith MD       Disposition: home with home care    Admission Diagnoses:   Patient Active Problem List   Diagnosis    CAP (community acquired pneumonia)    PNA (pneumonia)    History of tobacco abuse    Sepsis (HCC)    Acute upper GI bleed    Acute GI bleeding    Cellulitis of fifth toe of left foot    Cellulitis    Severe malnutrition (HCC)       Discharge Diagnoses: Principal Problem:    Cellulitis  Active Problems:    Cellulitis of fifth toe of left foot    Severe malnutrition (HCC)  Resolved Problems:    * No resolved hospital problems. *      Code Status:  Full Code    Condition:  Stable    Discharge Diet: Diet:  ADULT DIET; Regular  ADULT ORAL NUTRITION SUPPLEMENT; Breakfast, Dinner, Lunch; Diabetic Oral Supplement    PCP to do list:  pt to follow with podiatry for wound healing    No future appointments.     Hospital Course: 79 y.o. female with PMHX sig for primary biliary cirrhosis, hypothyroidism, COPD, GERD, and OA  who presented to [facility] with progressive L foot swelling and pain. Underwent local I&D in ED and placed on ABX. She is well known to Podiatry.  s/p I&D in ED.  Podiatry consulted s/p I&D w/ metatarsal bone resection 18 Sept w/out complications.  Continued Vanco/Cefepime initially, changed to Cefazolin per ID.    Plan is for patient to go home and family will care for her.  They are declining SNF.  [facility].  Plan is for short course of oral abx only.    This patient meets with criteria for  severe  malnutrution based on a BMI of  Body mass index is 15.27 kg/m²..  This malnutrition is likely due to COPD/asthma and non-alcoholic

## 2024-09-23 LAB
BACTERIA SPEC AEROBE CULT: ABNORMAL
BACTERIA SPEC AEROBE CULT: ABNORMAL
BACTERIA SPEC ANAEROBE CULT: ABNORMAL
BACTERIA SPEC ANAEROBE CULT: ABNORMAL
GRAM STN SPEC: ABNORMAL
GRAM STN SPEC: ABNORMAL
ORGANISM: ABNORMAL
ORGANISM: ABNORMAL

## 2024-11-04 NOTE — PROGRESS NOTES
Treasure D Kinhalt    Age 79 y.o.    female    1945    N [id number]    11/12/2024  Arrival Time_____________  OR Time____________30 Min     Procedure(s):  ESOPHAGOGASTRODUODENOSCOPY                      Monitor Anesthesia Care    Surgeon(s):  Violet Kelly, MD       Phone [phone] (home)     [facility]  Date  Info Source  Home  Cell         Work  _____________________________________________________________________  _____________________________________________________________________  _____________________________________________________________________  _____________________________________________________________________  _____________________________________________________________________    PCP _____________________________ Phone_________________     H&P  ________________  Bringing      Chart              Epic      DOS      Called________  EKG ________________   Bringing      Chart              Epic      DOS      Called________  LABS________________   Bringing     Chart              Epic      DOS      Called________  Cardiac Clearance ______ Bringing      Chart              Epic      DOS      Called________  Pulmonary Clearance____ Bringing      Chart              Epic      DOS      Called________    Cardiologist________________________ Phone___________________________  Pulmonologist_______________________Phone___________________________    ? Advance Directives   ? Protestant concerns / Waiver on Chart            [employer]________________  ? Pre-op Instructions Given /Understood          _________________________________  ? Directions to Surgery Center                          _________________________________  ? Transportation Home_______________      __________________________________  ? Crutches/Walker__________________        __________________________________    Orders: Hard copy/ EPIC                 Transcribed/ EPIC              _______Wt.    ________Pharmacy

## 2024-11-08 NOTE — PROGRESS NOTES
Date and time of surgery : 11/12/24 at 0830             Arrival Time: 0730      Bring Picture ID and insurance card.  Please wear simple, loose fitting clothing to the hospital.   Do not bring valuables (money, credit cards, checkbooks, etc.)   Do not wear any makeup (including  eye makeup) and no nail polish or artificial nails on your fingers or toes.  DO NOT wear any jewelry or piercings on day of surgery.  All body piercing jewelry must be removed.  If you have dentures, they will be removed before going to the OR; we will provide you a container.  If you wear contact lenses or glasses, they will be removed; please bring a case for them.  Shower the evening before or morning of surgery with antibacterial soap.  Nothing to eat or drink after midnight the day before surgery.   You may brush your teeth and gargle the morning of surgery.  DO NOT SWALLOW WATER.   The morning of your procedure take only Levothyroxine with a sip of water and use Trelegy inhaler prior to coming  Aspirin, Ibuprofen, Advil, Naproxen, Vitamin E and other Anti-inflammatory products and supplements should be stopped for 5 -7days before surgery or as directed by your physician.  Do not smoke or drink any alcoholic beverages 24 hours prior to surgery.  This includes NA Beer. Refrain from the usage of any recreational drugs, including non-prescribed prescription drugs.   You MUST plan for a responsible adult to stay on site while you are here and take you home after your surgery. You will not be allowed to leave alone or drive yourself home. It is strongly suggested someone stay with you the first 24 hrs. Your surgery will be cancelled if you do not have a ride home.  To help prevent infection, change your sheets the night before surgery.   If you  have a Living Will and Durable Power of  for Healthcare, please bring in a copy.  Notify your Surgeon if you develop any illness between now and time of surgery. Cough, cold, fever, sore

## 2024-11-12 ENCOUNTER — ANESTHESIA EVENT (OUTPATIENT)
Dept: ENDOSCOPY | Age: 79
End: 2024-11-12
Payer: MEDICARE

## 2024-11-12 ENCOUNTER — ANESTHESIA (OUTPATIENT)
Dept: ENDOSCOPY | Age: 79
End: 2024-11-12
Payer: MEDICARE

## 2024-11-12 ENCOUNTER — HOSPITAL ENCOUNTER (OUTPATIENT)
Age: 79
Setting detail: OUTPATIENT SURGERY
Discharge: HOME | End: 2024-11-12
Attending: INTERNAL MEDICINE | Admitting: INTERNAL MEDICINE
Payer: MEDICARE

## 2024-11-12 VITALS
BODY MASS INDEX: 15.36 KG/M2 | SYSTOLIC BLOOD PRESSURE: 101 MMHG | OXYGEN SATURATION: 97 % | WEIGHT: 90 LBS | RESPIRATION RATE: 18 BRPM | TEMPERATURE: 97.7 F | HEART RATE: 80 BPM | HEIGHT: 64 IN | DIASTOLIC BLOOD PRESSURE: 55 MMHG

## 2024-11-12 DIAGNOSIS — K74.3 PRIMARY BILIARY CHOLANGITIS (HCC): Primary | ICD-10-CM

## 2024-11-12 PROCEDURE — 3609017100 HC EGD: Performed by: INTERNAL MEDICINE

## 2024-11-12 PROCEDURE — 2500000003 HC RX 250 WO HCPCS

## 2024-11-12 PROCEDURE — 6360000002 HC RX W HCPCS

## 2024-11-12 PROCEDURE — 2709999900 HC NON-CHARGEABLE SUPPLY: Performed by: INTERNAL MEDICINE

## 2024-11-12 PROCEDURE — 3700000000 HC ANESTHESIA ATTENDED CARE: Performed by: INTERNAL MEDICINE

## 2024-11-12 PROCEDURE — 7100000010 HC PHASE II RECOVERY - FIRST 15 MIN: Performed by: INTERNAL MEDICINE

## 2024-11-12 PROCEDURE — 7100000011 HC PHASE II RECOVERY - ADDTL 15 MIN: Performed by: INTERNAL MEDICINE

## 2024-11-12 RX ORDER — SODIUM CHLORIDE 0.9 % (FLUSH) 0.9 %
5-40 SYRINGE (ML) INJECTION PRN
Status: DISCONTINUED | OUTPATIENT
Start: 2024-11-12 | End: 2024-11-12 | Stop reason: HOSPADM

## 2024-11-12 RX ORDER — OXYCODONE HYDROCHLORIDE 5 MG/1
5 TABLET ORAL
Status: DISCONTINUED | OUTPATIENT
Start: 2024-11-12 | End: 2024-11-12 | Stop reason: HOSPADM

## 2024-11-12 RX ORDER — NALOXONE HYDROCHLORIDE 0.4 MG/ML
INJECTION, SOLUTION INTRAMUSCULAR; INTRAVENOUS; SUBCUTANEOUS PRN
Status: DISCONTINUED | OUTPATIENT
Start: 2024-11-12 | End: 2024-11-12 | Stop reason: HOSPADM

## 2024-11-12 RX ORDER — SODIUM CHLORIDE 0.9 % (FLUSH) 0.9 %
5-40 SYRINGE (ML) INJECTION EVERY 12 HOURS SCHEDULED
Status: DISCONTINUED | OUTPATIENT
Start: 2024-11-12 | End: 2024-11-12 | Stop reason: HOSPADM

## 2024-11-12 RX ORDER — SODIUM CHLORIDE, SODIUM LACTATE, POTASSIUM CHLORIDE, CALCIUM CHLORIDE 600; 310; 30; 20 MG/100ML; MG/100ML; MG/100ML; MG/100ML
INJECTION, SOLUTION INTRAVENOUS CONTINUOUS
Status: DISCONTINUED | OUTPATIENT
Start: 2024-11-12 | End: 2024-11-12 | Stop reason: HOSPADM

## 2024-11-12 RX ORDER — DIPHENHYDRAMINE HYDROCHLORIDE 50 MG/ML
12.5 INJECTION INTRAMUSCULAR; INTRAVENOUS
Status: DISCONTINUED | OUTPATIENT
Start: 2024-11-12 | End: 2024-11-12 | Stop reason: HOSPADM

## 2024-11-12 RX ORDER — LABETALOL HYDROCHLORIDE 5 MG/ML
10 INJECTION, SOLUTION INTRAVENOUS
Status: DISCONTINUED | OUTPATIENT
Start: 2024-11-12 | End: 2024-11-12 | Stop reason: HOSPADM

## 2024-11-12 RX ORDER — SODIUM CHLORIDE 9 MG/ML
INJECTION, SOLUTION INTRAVENOUS PRN
Status: DISCONTINUED | OUTPATIENT
Start: 2024-11-12 | End: 2024-11-12 | Stop reason: HOSPADM

## 2024-11-12 RX ORDER — PROPOFOL 10 MG/ML
INJECTION, EMULSION INTRAVENOUS
Status: DISCONTINUED | OUTPATIENT
Start: 2024-11-12 | End: 2024-11-12 | Stop reason: SDUPTHER

## 2024-11-12 RX ORDER — MEPERIDINE HYDROCHLORIDE 50 MG/ML
12.5 INJECTION INTRAMUSCULAR; INTRAVENOUS; SUBCUTANEOUS EVERY 5 MIN PRN
Status: DISCONTINUED | OUTPATIENT
Start: 2024-11-12 | End: 2024-11-12 | Stop reason: HOSPADM

## 2024-11-12 RX ORDER — LIDOCAINE HYDROCHLORIDE 20 MG/ML
INJECTION, SOLUTION EPIDURAL; INFILTRATION; INTRACAUDAL; PERINEURAL
Status: DISCONTINUED | OUTPATIENT
Start: 2024-11-12 | End: 2024-11-12 | Stop reason: SDUPTHER

## 2024-11-12 RX ORDER — LORAZEPAM 2 MG/ML
0.5 INJECTION INTRAMUSCULAR
Status: DISCONTINUED | OUTPATIENT
Start: 2024-11-12 | End: 2024-11-12 | Stop reason: HOSPADM

## 2024-11-12 RX ORDER — URSODIOL 500 MG/1
TABLET, FILM COATED ORAL
Qty: 90 TABLET | Refills: 5 | Status: SHIPPED | OUTPATIENT
Start: 2024-11-12

## 2024-11-12 RX ORDER — MIDAZOLAM HYDROCHLORIDE 1 MG/ML
2 INJECTION, SOLUTION INTRAMUSCULAR; INTRAVENOUS
Status: DISCONTINUED | OUTPATIENT
Start: 2024-11-12 | End: 2024-11-12 | Stop reason: HOSPADM

## 2024-11-12 RX ORDER — PROCHLORPERAZINE EDISYLATE 5 MG/ML
5 INJECTION INTRAMUSCULAR; INTRAVENOUS
Status: DISCONTINUED | OUTPATIENT
Start: 2024-11-12 | End: 2024-11-12 | Stop reason: HOSPADM

## 2024-11-12 RX ORDER — ONDANSETRON 2 MG/ML
4 INJECTION INTRAMUSCULAR; INTRAVENOUS
Status: DISCONTINUED | OUTPATIENT
Start: 2024-11-12 | End: 2024-11-12 | Stop reason: HOSPADM

## 2024-11-12 RX ADMIN — LIDOCAINE HYDROCHLORIDE 60 MG: 20 INJECTION, SOLUTION EPIDURAL; INFILTRATION; INTRACAUDAL; PERINEURAL at 08:38

## 2024-11-12 RX ADMIN — PROPOFOL 20 MG: 10 INJECTION, EMULSION INTRAVENOUS at 08:38

## 2024-11-12 RX ADMIN — PROPOFOL 20 MG: 10 INJECTION, EMULSION INTRAVENOUS at 08:42

## 2024-11-12 ASSESSMENT — PAIN SCALES - GENERAL
PAINLEVEL_OUTOF10: 0
PAINLEVEL_OUTOF10: 0

## 2024-11-12 ASSESSMENT — PAIN - FUNCTIONAL ASSESSMENT: PAIN_FUNCTIONAL_ASSESSMENT: 0-10

## 2024-11-12 NOTE — H&P
Gastroenterology Note             Pre-operative History and Physical    Patient: Treasure Fatima  : 1945  CSN:     History Obtained From:  patient and/or guardian.     HISTORY OF PRESENT ILLNESS:    The patient is a 79 y.o. female  here for EGD    Past Medical History:    Past Medical History:   Diagnosis Date    Acid reflux     Arthritis     COPD (chronic obstructive pulmonary disease) (HCC)     Pneumonia     Primary biliary cirrhosis (HCC)     Thyroid disease     hypothyroidism     Past Surgical History:    Past Surgical History:   Procedure Laterality Date    COLONOSCOPY N/A 3/12/2021    COLONOSCOPY DIAGNOSTIC performed by Niko Farley MD at [facility] ENDOSCOPY    FOOT DEBRIDEMENT Left 2024    LEFT FOOT DEBRIDEMENT INCISION AND DRAINAGE WITH PARTIAL FIFTH RAY AMPUTATION. performed by Lelo Montilla MD at [facility] OR    HYSTERECTOMY (CERVIX STATUS UNKNOWN)      UPPER GASTROINTESTINAL ENDOSCOPY N/A 3/12/2021    EGD BAND LIGATION performed by Niko Farley MD at [facility] ENDOSCOPY    UPPER GASTROINTESTINAL ENDOSCOPY N/A 2021    EGD BAND LIGATION performed by Violet Kelly MD at [facility] ENDOSCOPY    UPPER GASTROINTESTINAL ENDOSCOPY N/A 2021    EGD performed by Violet Kelly MD at [facility] ENDOSCOPY    UPPER GASTROINTESTINAL ENDOSCOPY  2021    EGD BAND LIGATION performed by Violet Kelly MD at [facility] ENDOSCOPY    UPPER GASTROINTESTINAL ENDOSCOPY N/A 2022    EGD DIAGNOSTIC ONLY performed by Violet Kelly MD at [facility] ENDOSCOPY    UPPER GASTROINTESTINAL ENDOSCOPY  2022    EGD FOREIGN BODY REMOVAL performed by Violet Kelly MD at [facility] ENDOSCOPY    UPPER GASTROINTESTINAL ENDOSCOPY N/A 2023    EGD performed by Gian Gonsales MD at [facility] ENDOSCOPY    UPPER GASTROINTESTINAL ENDOSCOPY  2023    EGD BAND LIGATION performed by Gian Gonsales MD at [facility] ENDOSCOPY    UPPER GASTROINTESTINAL ENDOSCOPY N/A 8/10/2023    ESOPHAGOGASTRODUODENOSCOPY performed

## 2024-11-12 NOTE — DISCHARGE INSTRUCTIONS
PATIENT INSTRUCTIONS  POST-SEDATION        IMMEDIATELY FOLLOWING PROCEDURE:    Do not drive or operate machinery for the first twenty four hours after surgery.     Do not make any important decisions for twenty four hours after surgery or while taking narcotic pain medications or sedatives.     You should NOT BE LEFT UNATTENDED OR ALONE. A responsible adult should be with you for the rest of the day of your procedure and also during the night for your protection and safety.    You may experience some light headedness. Rest at home with activity as tolerated. You may not need to go to bed, but it is important to rest for the next 24 hours. You should not engage in athletic sports such as basketball, volleyball, jogging, skating, or activities requiring refined motor skills for 24 hours.   If you develop intractable nausea and vomiting or a severe headache please notify your doctor immediately.   You are not expected to have any fever, but if you feel warm, take your temperature. If you have a fever 101 degrees or higher, call your doctor.     If you have had an Endoscopy:   *Eat lightly for your first meal and gradually resume your normal / prescribed diet. DO NOT eat or drink until your gag reflex returns.   *If you have a sore throat you may use lozenges, or salt water gargles.      ONCE YOU ARE HOME, IF YOU SHOULD HAVE:  Difficulty in breathing, persistent nausea or vomiting, bleeding you feel is excessive, or pain that is unusual, increased abdominal bloating, or any swelling, fever / chills, call your physician. If you cannot contact your physician, but feel that your signs and symptoms need a physician's attention, go to the Emergency Department.      FOLLOW-UP:    Please follow up with your Primary Care Provider as scheduled or needed.    Call Violet Ryan MD if there are any GI concerns. [phone]      You may be receiving a follow up phone call to ask about your care.         Upper GI Endoscopy:

## 2024-11-12 NOTE — ANESTHESIA PRE PROCEDURE
\"YYD0UAR\", \"YSD9EPB\", \"BEART\", \"G3CIDRPR\"     Type & Screen (If Applicable):  Lab Results   Component Value Date    ABORH O NEG 03/11/2021    LABANTI NEG 03/11/2021       Drug/Infectious Status (If Applicable):  No results found for: \"HIV\", \"HEPCAB\"    COVID-19 Screening (If Applicable):   Lab Results   Component Value Date/Time    COVID19 Not Detected 01/14/2022 03:00 PM           Anesthesia Evaluation    Airway: Mallampati: II          Dental:    (+) edentulous      Pulmonary: breath sounds clear to auscultation  (+) pneumonia:  COPD:                                     Cardiovascular:            Rhythm: regular  Rate: normal                    Neuro/Psych:   (+) neuromuscular disease:            GI/Hepatic/Renal:   (+) GERD:, liver disease:          Endo/Other:                     Abdominal:             Vascular:          Other Findings:       Anesthesia Plan      MAC     ASA 3       Induction: intravenous.      Anesthetic plan and risks discussed with patient.                    Yunior Patton MD   11/12/2024

## 2024-11-12 NOTE — ANESTHESIA POSTPROCEDURE EVALUATION
Department of Anesthesiology  Postprocedure Note    Patient: Treasure Fatima  MRN: [id number]  YOB: 1945  Date of evaluation: 11/12/2024    Procedure Summary       Date: 11/12/24 Room / Location: [place] [address] / [facility]    Anesthesia Start: 0832 Anesthesia Stop: 0849    Procedure: ESOPHAGOGASTRODUODENOSCOPY Diagnosis:       Gastroesophageal reflux disease, unspecified whether esophagitis present      (Gastroesophageal reflux disease, unspecified whether esophagitis present [K21.9])    Surgeons: Violet Kelly MD Responsible Provider:     Anesthesia Type: MAC ASA Status: 3            Anesthesia Type: No value filed.    Edita Phase I: Edita Score: 10    Edita Phase II: Edita Score: 10    Anesthesia Post Evaluation    Patient location during evaluation: PACU  Patient participation: complete - patient participated  Level of consciousness: awake and alert  Airway patency: patent  Nausea & Vomiting: no vomiting and no nausea  Cardiovascular status: hemodynamically stable and blood pressure returned to baseline  Respiratory status: acceptable  Hydration status: euvolemic  Comments: --------------------            11/12/24               0857     --------------------   BP:     (!) 110/55   Pulse:      80       Resp:       18       Temp:                SpO2:      97%      --------------------    Pain management: adequate    No notable events documented.

## 2024-11-29 ENCOUNTER — HOSPITAL ENCOUNTER (OUTPATIENT)
Dept: ULTRASOUND IMAGING | Age: 79
Discharge: HOME | End: 2024-11-29
Attending: INTERNAL MEDICINE
Payer: MEDICARE

## 2024-11-29 DIAGNOSIS — K74.3 PRIMARY BILIARY CHOLANGITIS (HCC): ICD-10-CM

## 2024-11-29 PROCEDURE — 76705 ECHO EXAM OF ABDOMEN: CPT

## 2025-01-15 ENCOUNTER — APPOINTMENT (OUTPATIENT)
Dept: CT IMAGING | Age: 80
DRG: 193 | End: 2025-01-15
Payer: MEDICARE

## 2025-01-15 ENCOUNTER — HOSPITAL ENCOUNTER (INPATIENT)
Age: 80
LOS: 3 days | Discharge: HOME HEALTH | DRG: 193 | End: 2025-01-18
Attending: STUDENT IN AN ORGANIZED HEALTH CARE EDUCATION/TRAINING PROGRAM | Admitting: STUDENT IN AN ORGANIZED HEALTH CARE EDUCATION/TRAINING PROGRAM
Payer: MEDICARE

## 2025-01-15 ENCOUNTER — APPOINTMENT (OUTPATIENT)
Dept: GENERAL RADIOLOGY | Age: 80
DRG: 193 | End: 2025-01-15
Payer: MEDICARE

## 2025-01-15 DIAGNOSIS — E11.65 TYPE 2 DIABETES MELLITUS WITH HYPERGLYCEMIA, UNSPECIFIED WHETHER LONG TERM INSULIN USE (HCC): ICD-10-CM

## 2025-01-15 DIAGNOSIS — N18.9 CHRONIC RENAL IMPAIRMENT, UNSPECIFIED CKD STAGE: ICD-10-CM

## 2025-01-15 DIAGNOSIS — S32.010A CLOSED COMPRESSION FRACTURE OF BODY OF L1 VERTEBRA (HCC): ICD-10-CM

## 2025-01-15 DIAGNOSIS — R31.9 HEMATURIA, UNSPECIFIED TYPE: ICD-10-CM

## 2025-01-15 DIAGNOSIS — J18.9 PNEUMONIA OF BOTH LUNGS DUE TO INFECTIOUS ORGANISM, UNSPECIFIED PART OF LUNG: Primary | ICD-10-CM

## 2025-01-15 PROBLEM — J15.8 PNEUMONIA DUE TO OTHER SPECIFIED BACTERIA (HCC): Status: ACTIVE | Noted: 2025-01-15

## 2025-01-15 LAB
ALBUMIN SERPL-MCNC: 2.9 G/DL (ref 3.4–5)
ALBUMIN/GLOB SERPL: 0.7 {RATIO} (ref 1.1–2.2)
ALP SERPL-CCNC: 171 U/L (ref 40–129)
ALT SERPL-CCNC: 13 U/L (ref 10–40)
ANION GAP SERPL CALCULATED.3IONS-SCNC: 13 MMOL/L (ref 3–16)
AST SERPL-CCNC: 31 U/L (ref 15–37)
BACTERIA URNS QL MICRO: ABNORMAL /HPF
BASOPHILS # BLD: 0 K/UL (ref 0–0.2)
BASOPHILS NFR BLD: 0.2 %
BILIRUB SERPL-MCNC: 1.1 MG/DL (ref 0–1)
BILIRUB UR QL STRIP.AUTO: NEGATIVE
BUN SERPL-MCNC: 21 MG/DL (ref 7–20)
CALCIUM SERPL-MCNC: 9 MG/DL (ref 8.3–10.6)
CHLORIDE SERPL-SCNC: 101 MMOL/L (ref 99–110)
CLARITY UR: CLEAR
CO2 SERPL-SCNC: 22 MMOL/L (ref 21–32)
COLOR UR: YELLOW
CREAT SERPL-MCNC: 1.3 MG/DL (ref 0.6–1.2)
DEPRECATED RDW RBC AUTO: 14.8 % (ref 12.4–15.4)
EOSINOPHIL # BLD: 0 K/UL (ref 0–0.6)
EOSINOPHIL NFR BLD: 0.1 %
EPI CELLS #/AREA URNS HPF: ABNORMAL /HPF (ref 0–5)
FLUAV RNA RESP QL NAA+PROBE: NOT DETECTED
FLUBV RNA RESP QL NAA+PROBE: NOT DETECTED
GFR SERPLBLD CREATININE-BSD FMLA CKD-EPI: 41 ML/MIN/{1.73_M2}
GLUCOSE BLD-MCNC: 220 MG/DL (ref 70–99)
GLUCOSE SERPL-MCNC: 315 MG/DL (ref 70–99)
GLUCOSE UR STRIP.AUTO-MCNC: NEGATIVE MG/DL
HCT VFR BLD AUTO: 35.5 % (ref 36–48)
HGB BLD-MCNC: 11.8 G/DL (ref 12–16)
HGB UR QL STRIP.AUTO: ABNORMAL
HYALINE CASTS #/AREA URNS LPF: ABNORMAL /LPF (ref 0–2)
KETONES UR STRIP.AUTO-MCNC: NEGATIVE MG/DL
LACTATE BLDV-SCNC: 1.4 MMOL/L (ref 0.4–2)
LEUKOCYTE ESTERASE UR QL STRIP.AUTO: NEGATIVE
LYMPHOCYTES # BLD: 0.5 K/UL (ref 1–5.1)
LYMPHOCYTES NFR BLD: 3.6 %
MCH RBC QN AUTO: 30.4 PG (ref 26–34)
MCHC RBC AUTO-ENTMCNC: 33.3 G/DL (ref 31–36)
MCV RBC AUTO: 91.3 FL (ref 80–100)
MONOCYTES # BLD: 0.6 K/UL (ref 0–1.3)
MONOCYTES NFR BLD: 4.2 %
NEUTROPHILS # BLD: 12.4 K/UL (ref 1.7–7.7)
NEUTROPHILS NFR BLD: 91.9 %
NITRITE UR QL STRIP.AUTO: NEGATIVE
PERFORMED ON: ABNORMAL
PH UR STRIP.AUTO: 6 [PH] (ref 5–8)
PLATELET # BLD AUTO: 143 K/UL (ref 135–450)
PMV BLD AUTO: 8 FL (ref 5–10.5)
POTASSIUM SERPL-SCNC: 4.8 MMOL/L (ref 3.5–5.1)
PROT SERPL-MCNC: 7.2 G/DL (ref 6.4–8.2)
PROT UR STRIP.AUTO-MCNC: 30 MG/DL
RBC # BLD AUTO: 3.88 M/UL (ref 4–5.2)
RBC #/AREA URNS HPF: >100 /HPF (ref 0–4)
REASON FOR REJECTION: NORMAL
REJECTED TEST: NORMAL
SARS-COV-2 RNA RESP QL NAA+PROBE: NOT DETECTED
SODIUM SERPL-SCNC: 136 MMOL/L (ref 136–145)
SP GR UR STRIP.AUTO: 1.01 (ref 1–1.03)
TROPONIN, HIGH SENSITIVITY: 22 NG/L (ref 0–14)
UA COMPLETE W REFLEX CULTURE PNL UR: ABNORMAL
UA DIPSTICK W REFLEX MICRO PNL UR: YES
URN SPEC COLLECT METH UR: ABNORMAL
UROBILINOGEN UR STRIP-ACNC: 0.2 E.U./DL
WBC # BLD AUTO: 13.5 K/UL (ref 4–11)
WBC #/AREA URNS HPF: ABNORMAL /HPF (ref 0–5)

## 2025-01-15 PROCEDURE — 2500000003 HC RX 250 WO HCPCS: Performed by: STUDENT IN AN ORGANIZED HEALTH CARE EDUCATION/TRAINING PROGRAM

## 2025-01-15 PROCEDURE — 1200000000 HC SEMI PRIVATE

## 2025-01-15 PROCEDURE — 6370000000 HC RX 637 (ALT 250 FOR IP): Performed by: STUDENT IN AN ORGANIZED HEALTH CARE EDUCATION/TRAINING PROGRAM

## 2025-01-15 PROCEDURE — 84484 ASSAY OF TROPONIN QUANT: CPT

## 2025-01-15 PROCEDURE — 93005 ELECTROCARDIOGRAM TRACING: CPT | Performed by: PHYSICIAN ASSISTANT

## 2025-01-15 PROCEDURE — 83605 ASSAY OF LACTIC ACID: CPT

## 2025-01-15 PROCEDURE — 2580000003 HC RX 258: Performed by: STUDENT IN AN ORGANIZED HEALTH CARE EDUCATION/TRAINING PROGRAM

## 2025-01-15 PROCEDURE — 81001 URINALYSIS AUTO W/SCOPE: CPT

## 2025-01-15 PROCEDURE — 80053 COMPREHEN METABOLIC PANEL: CPT

## 2025-01-15 PROCEDURE — 72128 CT CHEST SPINE W/O DYE: CPT

## 2025-01-15 PROCEDURE — 71046 X-RAY EXAM CHEST 2 VIEWS: CPT

## 2025-01-15 PROCEDURE — 94640 AIRWAY INHALATION TREATMENT: CPT

## 2025-01-15 PROCEDURE — 6360000002 HC RX W HCPCS: Performed by: STUDENT IN AN ORGANIZED HEALTH CARE EDUCATION/TRAINING PROGRAM

## 2025-01-15 PROCEDURE — 99285 EMERGENCY DEPT VISIT HI MDM: CPT

## 2025-01-15 PROCEDURE — 36415 COLL VENOUS BLD VENIPUNCTURE: CPT

## 2025-01-15 PROCEDURE — 85025 COMPLETE CBC W/AUTO DIFF WBC: CPT

## 2025-01-15 PROCEDURE — 72131 CT LUMBAR SPINE W/O DYE: CPT

## 2025-01-15 PROCEDURE — 87636 SARSCOV2 & INF A&B AMP PRB: CPT

## 2025-01-15 RX ORDER — SODIUM CHLORIDE 0.9 % (FLUSH) 0.9 %
5-40 SYRINGE (ML) INJECTION PRN
Status: DISCONTINUED | OUTPATIENT
Start: 2025-01-15 | End: 2025-01-18 | Stop reason: HOSPADM

## 2025-01-15 RX ORDER — LEVOTHYROXINE SODIUM 50 UG/1
50 TABLET ORAL DAILY
Status: DISCONTINUED | OUTPATIENT
Start: 2025-01-15 | End: 2025-01-18 | Stop reason: HOSPADM

## 2025-01-15 RX ORDER — METHOCARBAMOL 500 MG/1
1000 TABLET, FILM COATED ORAL 4 TIMES DAILY
Status: DISCONTINUED | OUTPATIENT
Start: 2025-01-15 | End: 2025-01-16

## 2025-01-15 RX ORDER — SODIUM CHLORIDE 9 MG/ML
INJECTION, SOLUTION INTRAVENOUS PRN
Status: DISCONTINUED | OUTPATIENT
Start: 2025-01-15 | End: 2025-01-18 | Stop reason: HOSPADM

## 2025-01-15 RX ORDER — URSODIOL 300 MG/1
600 CAPSULE ORAL DAILY
Status: DISCONTINUED | OUTPATIENT
Start: 2025-01-15 | End: 2025-01-18 | Stop reason: HOSPADM

## 2025-01-15 RX ORDER — DEXTROSE MONOHYDRATE 100 MG/ML
INJECTION, SOLUTION INTRAVENOUS CONTINUOUS PRN
Status: DISCONTINUED | OUTPATIENT
Start: 2025-01-15 | End: 2025-01-18 | Stop reason: HOSPADM

## 2025-01-15 RX ORDER — PREDNISOLONE ACETATE 10 MG/ML
2 SUSPENSION/ DROPS OPHTHALMIC 4 TIMES DAILY
Status: DISCONTINUED | OUTPATIENT
Start: 2025-01-15 | End: 2025-01-18 | Stop reason: HOSPADM

## 2025-01-15 RX ORDER — IPRATROPIUM BROMIDE AND ALBUTEROL SULFATE 2.5; .5 MG/3ML; MG/3ML
1 SOLUTION RESPIRATORY (INHALATION)
Status: DISCONTINUED | OUTPATIENT
Start: 2025-01-15 | End: 2025-01-16

## 2025-01-15 RX ORDER — INSULIN GLARGINE 100 [IU]/ML
5 INJECTION, SOLUTION SUBCUTANEOUS DAILY
Status: DISCONTINUED | OUTPATIENT
Start: 2025-01-15 | End: 2025-01-16

## 2025-01-15 RX ORDER — SODIUM CHLORIDE 0.9 % (FLUSH) 0.9 %
5-40 SYRINGE (ML) INJECTION EVERY 12 HOURS SCHEDULED
Status: DISCONTINUED | OUTPATIENT
Start: 2025-01-15 | End: 2025-01-18 | Stop reason: HOSPADM

## 2025-01-15 RX ORDER — LIDOCAINE 4 G/G
1 PATCH TOPICAL DAILY
Status: DISCONTINUED | OUTPATIENT
Start: 2025-01-15 | End: 2025-01-18 | Stop reason: HOSPADM

## 2025-01-15 RX ORDER — ACETAMINOPHEN 325 MG/1
650 TABLET ORAL EVERY 6 HOURS SCHEDULED
Status: DISCONTINUED | OUTPATIENT
Start: 2025-01-15 | End: 2025-01-18 | Stop reason: HOSPADM

## 2025-01-15 RX ORDER — ACETAMINOPHEN 325 MG/1
650 TABLET ORAL EVERY 6 HOURS PRN
Status: DISCONTINUED | OUTPATIENT
Start: 2025-01-15 | End: 2025-01-15

## 2025-01-15 RX ORDER — OXYCODONE HYDROCHLORIDE 5 MG/1
5 TABLET ORAL EVERY 4 HOURS PRN
Status: DISCONTINUED | OUTPATIENT
Start: 2025-01-15 | End: 2025-01-18 | Stop reason: HOSPADM

## 2025-01-15 RX ORDER — POLYETHYLENE GLYCOL 3350 17 G/17G
17 POWDER, FOR SOLUTION ORAL DAILY PRN
Status: DISCONTINUED | OUTPATIENT
Start: 2025-01-15 | End: 2025-01-18 | Stop reason: HOSPADM

## 2025-01-15 RX ORDER — GLUCAGON 1 MG/ML
1 KIT INJECTION PRN
Status: DISCONTINUED | OUTPATIENT
Start: 2025-01-15 | End: 2025-01-18 | Stop reason: HOSPADM

## 2025-01-15 RX ORDER — ACETAMINOPHEN 650 MG/1
650 SUPPOSITORY RECTAL EVERY 6 HOURS PRN
Status: DISCONTINUED | OUTPATIENT
Start: 2025-01-15 | End: 2025-01-15

## 2025-01-15 RX ORDER — HEPARIN SODIUM 5000 [USP'U]/ML
5000 INJECTION, SOLUTION INTRAVENOUS; SUBCUTANEOUS 2 TIMES DAILY
Status: DISCONTINUED | OUTPATIENT
Start: 2025-01-15 | End: 2025-01-18 | Stop reason: HOSPADM

## 2025-01-15 RX ORDER — ONDANSETRON 2 MG/ML
4 INJECTION INTRAMUSCULAR; INTRAVENOUS EVERY 6 HOURS PRN
Status: DISCONTINUED | OUTPATIENT
Start: 2025-01-15 | End: 2025-01-18 | Stop reason: HOSPADM

## 2025-01-15 RX ORDER — PREDNISONE 20 MG/1
40 TABLET ORAL DAILY
Status: DISCONTINUED | OUTPATIENT
Start: 2025-01-15 | End: 2025-01-18 | Stop reason: HOSPADM

## 2025-01-15 RX ORDER — IPRATROPIUM BROMIDE AND ALBUTEROL SULFATE 2.5; .5 MG/3ML; MG/3ML
1 SOLUTION RESPIRATORY (INHALATION)
Status: DISCONTINUED | OUTPATIENT
Start: 2025-01-15 | End: 2025-01-15

## 2025-01-15 RX ORDER — INSULIN LISPRO 100 [IU]/ML
0-4 INJECTION, SOLUTION INTRAVENOUS; SUBCUTANEOUS
Status: DISCONTINUED | OUTPATIENT
Start: 2025-01-15 | End: 2025-01-16

## 2025-01-15 RX ORDER — ONDANSETRON 4 MG/1
4 TABLET, ORALLY DISINTEGRATING ORAL EVERY 8 HOURS PRN
Status: DISCONTINUED | OUTPATIENT
Start: 2025-01-15 | End: 2025-01-18 | Stop reason: HOSPADM

## 2025-01-15 RX ADMIN — HEPARIN SODIUM 5000 UNITS: 5000 INJECTION INTRAVENOUS; SUBCUTANEOUS at 20:40

## 2025-01-15 RX ADMIN — WATER 1000 MG: 1 INJECTION INTRAMUSCULAR; INTRAVENOUS; SUBCUTANEOUS at 19:07

## 2025-01-15 RX ADMIN — PREDNISOLONE ACETATE 2 DROP: 10 SUSPENSION/ DROPS OPHTHALMIC at 20:52

## 2025-01-15 RX ADMIN — IPRATROPIUM BROMIDE AND ALBUTEROL SULFATE 1 DOSE: 2.5; .5 SOLUTION RESPIRATORY (INHALATION) at 19:16

## 2025-01-15 RX ADMIN — DICLOFENAC SODIUM 4 G: 10 GEL TOPICAL at 20:52

## 2025-01-15 RX ADMIN — ONDANSETRON 4 MG: 2 INJECTION INTRAMUSCULAR; INTRAVENOUS at 20:39

## 2025-01-15 RX ADMIN — AZITHROMYCIN MONOHYDRATE 500 MG: 500 INJECTION, POWDER, LYOPHILIZED, FOR SOLUTION INTRAVENOUS at 21:13

## 2025-01-15 RX ADMIN — URSODIOL 600 MG: 300 CAPSULE ORAL at 20:51

## 2025-01-15 RX ADMIN — PREDNISONE 40 MG: 20 TABLET ORAL at 20:47

## 2025-01-15 RX ADMIN — Medication 10 ML: at 20:41

## 2025-01-15 RX ADMIN — METHOCARBAMOL 1000 MG: 500 TABLET ORAL at 20:39

## 2025-01-15 RX ADMIN — OXYCODONE 5 MG: 5 TABLET ORAL at 20:38

## 2025-01-15 ASSESSMENT — PAIN SCALES - GENERAL
PAINLEVEL_OUTOF10: 7
PAINLEVEL_OUTOF10: 7
PAINLEVEL_OUTOF10: 3

## 2025-01-15 ASSESSMENT — PAIN DESCRIPTION - LOCATION
LOCATION: BACK
LOCATION: BACK

## 2025-01-15 ASSESSMENT — PAIN - FUNCTIONAL ASSESSMENT: PAIN_FUNCTIONAL_ASSESSMENT: 0-10

## 2025-01-15 ASSESSMENT — LIFESTYLE VARIABLES
HOW MANY STANDARD DRINKS CONTAINING ALCOHOL DO YOU HAVE ON A TYPICAL DAY: PATIENT DOES NOT DRINK
HOW OFTEN DO YOU HAVE A DRINK CONTAINING ALCOHOL: NEVER

## 2025-01-15 NOTE — ED PROVIDER NOTES
SCREENINGS:    Nanda Coma Scale  Eye Opening: Spontaneous  Best Verbal Response: Oriented  Best Motor Response: Obeys commands  [place] Coma Scale Score: 15      CIWA Assessment  BP: (!) 112/57  Pulse: 85        PHYSICAL EXAM:       ED Triage Vitals   BP Systolic BP Percentile Diastolic BP Percentile Temp Temp Source Pulse Respirations SpO2   01/15/25 1408 -- -- 01/15/25 1407 01/15/25 1407 01/15/25 1408 01/15/25 1407 01/15/25 1409   (!) 144/72   98.3 °F (36.8 °C) Oral (!) 109 18 91 %      Height Weight - Scale         -- 01/15/25 1407          44.9 kg (99 lb)             Physical Exam    CONSTITUTIONAL: Awake and alert. Cooperative. Well-developed.  Patient somewhat frail, thin, chronically ill-appearing.. Non-toxic. No acute distress.  HENT: Normocephalic. Atraumatic. External ears normal, without discharge. No nasal discharge. Oropharynx clear. Mucous membranes moist.  EYES: Conjunctiva non-injected. No scleral icterus. PERRL.     NECK: Supple. Normal ROM.   CARDIOVASCULAR: RRR. Intact distal pulses.  PULMONARY/CHEST WALL: Effort normal. No tachypnea. Lungs clear to ausculation.   ABDOMEN: Soft. Nondistended. No tenderness to palpate.   /ANORECTAL: Not assessed  MUSKULOSKELETAL: Normal ROM. No acute deformities. No edema. No spinous process tenderness to palpate over the T or L-spine.  Mild tenderness to the lumbar musculature bilaterally.  SKIN: Warm and dry. No rash.  NEUROLOGICAL: Alert and oriented x 3. GCS 15. CN II-XII grossly intact. Strength is 5/5 in all extremities and sensation is intact.  Patellar DTRs 2+ bilaterally.  Normal gait.   PSYCHIATRIC: Normal affect      DIAGNOSTIC RESULTS:     LABS:    Results for orders placed or performed during the hospital encounter of 01/15/25   COVID-19 & Influenza Combo    Specimen: Nasopharyngeal Swab   Result Value Ref Range    SARS-CoV-2 RNA, RT PCR NOT DETECTED NOT DETECTED    Influenza A NOT DETECTED NOT DETECTED    Influenza B NOT DETECTED NOT    Diagnosis Date    Acid reflux     Arthritis     COPD (chronic obstructive pulmonary disease) (HCC)     Pneumonia     Primary biliary cirrhosis (HCC)     Thyroid disease     hypothyroidism     Records reviewed: None    Disposition considerations/Plan (include 1 test not done- why not, d/c vs admit): Patient arrives complaining chiefly of atraumatic back pain but ultimately admits to cough, congestion, shortness of breath as well.  Her workup is consistent with an L1 compression fracture, suspected to be spontaneous given her lack of fall or injury.  Her remaining workup points to pneumonia.  She has cough and congestion.  She has a CT T-spine and a two-view chest x-ray that is concerning for bilateral pneumonia.  Again she came in tachycardic though her heart rate has normalized.  She has an elevated white count.  Her lactate is normal.  She initially meets criteria for sepsis but certainly not for severe sepsis or septic shock.  I have reached out to the hospitalist to request admission.  Employed shared decision making.     FINAL IMPRESSION:      1. Pneumonia of both lungs due to infectious organism, unspecified part of lung    2. Closed compression fracture of body of L1 vertebra ([facility])    3. Chronic renal impairment, unspecified CKD stage    4. Type 2 diabetes mellitus with hyperglycemia, unspecified whether long term insulin use ([facility])    5. Hematuria, unspecified type          DISPOSITION/PLAN:   DISPOSITION Admitted               (Please note thatportions of this note were completed with a voice recognition program.  Efforts were made to edit the dictations, but occasionally words are mis-transcribed.)    RUPERT FRANCOC (electronicallysigned)              Adarsh Boucher PA  01/15/25 1848       Adarsh Boucher PA  01/15/25 2028

## 2025-01-16 PROBLEM — E43 SEVERE MALNUTRITION (HCC): Chronic | Status: ACTIVE | Noted: 2025-01-16

## 2025-01-16 LAB
ALBUMIN SERPL-MCNC: 2.5 G/DL (ref 3.4–5)
ALP SERPL-CCNC: 141 U/L (ref 40–129)
ALT SERPL-CCNC: 10 U/L (ref 10–40)
ANION GAP SERPL CALCULATED.3IONS-SCNC: 10 MMOL/L (ref 3–16)
AST SERPL-CCNC: 21 U/L (ref 15–37)
BASOPHILS # BLD: 0 K/UL (ref 0–0.2)
BASOPHILS NFR BLD: 0 %
BILIRUB DIRECT SERPL-MCNC: 0.4 MG/DL (ref 0–0.3)
BILIRUB INDIRECT SERPL-MCNC: 0.1 MG/DL (ref 0–1)
BILIRUB SERPL-MCNC: 0.5 MG/DL (ref 0–1)
BUN SERPL-MCNC: 21 MG/DL (ref 7–20)
CALCIUM SERPL-MCNC: 8.6 MG/DL (ref 8.3–10.6)
CHLORIDE SERPL-SCNC: 103 MMOL/L (ref 99–110)
CO2 SERPL-SCNC: 21 MMOL/L (ref 21–32)
CREAT SERPL-MCNC: 1.2 MG/DL (ref 0.6–1.2)
DEPRECATED RDW RBC AUTO: 14.6 % (ref 12.4–15.4)
EKG ATRIAL RATE: 80 BPM
EKG DIAGNOSIS: NORMAL
EKG P AXIS: 76 DEGREES
EKG P-R INTERVAL: 146 MS
EKG Q-T INTERVAL: 394 MS
EKG QRS DURATION: 116 MS
EKG QTC CALCULATION (BAZETT): 454 MS
EKG R AXIS: 92 DEGREES
EKG T AXIS: 56 DEGREES
EKG VENTRICULAR RATE: 80 BPM
EOSINOPHIL # BLD: 0 K/UL (ref 0–0.6)
EOSINOPHIL NFR BLD: 0 %
EST. AVERAGE GLUCOSE BLD GHB EST-MCNC: 145.6 MG/DL
GFR SERPLBLD CREATININE-BSD FMLA CKD-EPI: 46 ML/MIN/{1.73_M2}
GLUCOSE BLD-MCNC: 217 MG/DL (ref 70–99)
GLUCOSE BLD-MCNC: 250 MG/DL (ref 70–99)
GLUCOSE BLD-MCNC: 280 MG/DL (ref 70–99)
GLUCOSE BLD-MCNC: 360 MG/DL (ref 70–99)
GLUCOSE SERPL-MCNC: 246 MG/DL (ref 70–99)
HBA1C MFR BLD: 6.7 %
HCT VFR BLD AUTO: 29.8 % (ref 36–48)
HGB BLD-MCNC: 10.1 G/DL (ref 12–16)
LYMPHOCYTES # BLD: 0.3 K/UL (ref 1–5.1)
LYMPHOCYTES NFR BLD: 6.3 %
MCH RBC QN AUTO: 30.9 PG (ref 26–34)
MCHC RBC AUTO-ENTMCNC: 33.7 G/DL (ref 31–36)
MCV RBC AUTO: 91.7 FL (ref 80–100)
MONOCYTES # BLD: 0.1 K/UL (ref 0–1.3)
MONOCYTES NFR BLD: 1.2 %
NEUTROPHILS # BLD: 4 K/UL (ref 1.7–7.7)
NEUTROPHILS NFR BLD: 92.5 %
PERFORMED ON: ABNORMAL
PLATELET # BLD AUTO: 90 K/UL (ref 135–450)
PLATELET BLD QL SMEAR: ABNORMAL
PMV BLD AUTO: 8.5 FL (ref 5–10.5)
POTASSIUM SERPL-SCNC: 4.7 MMOL/L (ref 3.5–5.1)
PROT SERPL-MCNC: 6.1 G/DL (ref 6.4–8.2)
RBC # BLD AUTO: 3.25 M/UL (ref 4–5.2)
REASON FOR REJECTION: NORMAL
REJECTED TEST: NORMAL
SLIDE REVIEW: ABNORMAL
SODIUM SERPL-SCNC: 134 MMOL/L (ref 136–145)
WBC # BLD AUTO: 4.3 K/UL (ref 4–11)

## 2025-01-16 PROCEDURE — 1200000000 HC SEMI PRIVATE

## 2025-01-16 PROCEDURE — 80048 BASIC METABOLIC PNL TOTAL CA: CPT

## 2025-01-16 PROCEDURE — 2700000000 HC OXYGEN THERAPY PER DAY

## 2025-01-16 PROCEDURE — 83036 HEMOGLOBIN GLYCOSYLATED A1C: CPT

## 2025-01-16 PROCEDURE — 6370000000 HC RX 637 (ALT 250 FOR IP)

## 2025-01-16 PROCEDURE — 2580000003 HC RX 258

## 2025-01-16 PROCEDURE — 97162 PT EVAL MOD COMPLEX 30 MIN: CPT

## 2025-01-16 PROCEDURE — 97166 OT EVAL MOD COMPLEX 45 MIN: CPT

## 2025-01-16 PROCEDURE — 2500000003 HC RX 250 WO HCPCS: Performed by: STUDENT IN AN ORGANIZED HEALTH CARE EDUCATION/TRAINING PROGRAM

## 2025-01-16 PROCEDURE — 97116 GAIT TRAINING THERAPY: CPT

## 2025-01-16 PROCEDURE — 6370000000 HC RX 637 (ALT 250 FOR IP): Performed by: STUDENT IN AN ORGANIZED HEALTH CARE EDUCATION/TRAINING PROGRAM

## 2025-01-16 PROCEDURE — 94640 AIRWAY INHALATION TREATMENT: CPT

## 2025-01-16 PROCEDURE — 94761 N-INVAS EAR/PLS OXIMETRY MLT: CPT

## 2025-01-16 PROCEDURE — 2580000003 HC RX 258: Performed by: STUDENT IN AN ORGANIZED HEALTH CARE EDUCATION/TRAINING PROGRAM

## 2025-01-16 PROCEDURE — 6360000002 HC RX W HCPCS: Performed by: STUDENT IN AN ORGANIZED HEALTH CARE EDUCATION/TRAINING PROGRAM

## 2025-01-16 PROCEDURE — 97535 SELF CARE MNGMENT TRAINING: CPT

## 2025-01-16 PROCEDURE — 36415 COLL VENOUS BLD VENIPUNCTURE: CPT

## 2025-01-16 PROCEDURE — 80076 HEPATIC FUNCTION PANEL: CPT

## 2025-01-16 PROCEDURE — 85025 COMPLETE CBC W/AUTO DIFF WBC: CPT

## 2025-01-16 PROCEDURE — 97530 THERAPEUTIC ACTIVITIES: CPT

## 2025-01-16 PROCEDURE — 93010 ELECTROCARDIOGRAM REPORT: CPT | Performed by: INTERNAL MEDICINE

## 2025-01-16 PROCEDURE — 97110 THERAPEUTIC EXERCISES: CPT

## 2025-01-16 RX ORDER — METHOCARBAMOL 500 MG/1
1000 TABLET, FILM COATED ORAL 4 TIMES DAILY PRN
Status: DISCONTINUED | OUTPATIENT
Start: 2025-01-16 | End: 2025-01-18 | Stop reason: HOSPADM

## 2025-01-16 RX ORDER — IPRATROPIUM BROMIDE AND ALBUTEROL SULFATE 2.5; .5 MG/3ML; MG/3ML
1 SOLUTION RESPIRATORY (INHALATION) EVERY 4 HOURS PRN
Status: DISCONTINUED | OUTPATIENT
Start: 2025-01-16 | End: 2025-01-18 | Stop reason: HOSPADM

## 2025-01-16 RX ORDER — INSULIN LISPRO 100 [IU]/ML
0-16 INJECTION, SOLUTION INTRAVENOUS; SUBCUTANEOUS
Status: DISCONTINUED | OUTPATIENT
Start: 2025-01-16 | End: 2025-01-18 | Stop reason: HOSPADM

## 2025-01-16 RX ORDER — SODIUM CHLORIDE 9 MG/ML
INJECTION, SOLUTION INTRAVENOUS CONTINUOUS
Status: DISCONTINUED | OUTPATIENT
Start: 2025-01-16 | End: 2025-01-17

## 2025-01-16 RX ORDER — BUDESONIDE AND FORMOTEROL FUMARATE DIHYDRATE 80; 4.5 UG/1; UG/1
2 AEROSOL RESPIRATORY (INHALATION)
Status: DISCONTINUED | OUTPATIENT
Start: 2025-01-16 | End: 2025-01-18 | Stop reason: HOSPADM

## 2025-01-16 RX ORDER — INSULIN GLARGINE 100 [IU]/ML
5 INJECTION, SOLUTION SUBCUTANEOUS NIGHTLY
Status: DISCONTINUED | OUTPATIENT
Start: 2025-01-16 | End: 2025-01-18 | Stop reason: HOSPADM

## 2025-01-16 RX ORDER — BUDESONIDE AND FORMOTEROL FUMARATE DIHYDRATE 80; 4.5 UG/1; UG/1
2 AEROSOL RESPIRATORY (INHALATION)
Status: DISCONTINUED | OUTPATIENT
Start: 2025-01-16 | End: 2025-01-16

## 2025-01-16 RX ADMIN — PREDNISOLONE ACETATE 2 DROP: 10 SUSPENSION/ DROPS OPHTHALMIC at 16:19

## 2025-01-16 RX ADMIN — PREDNISOLONE ACETATE 2 DROP: 10 SUSPENSION/ DROPS OPHTHALMIC at 09:07

## 2025-01-16 RX ADMIN — PREDNISOLONE ACETATE 2 DROP: 10 SUSPENSION/ DROPS OPHTHALMIC at 19:56

## 2025-01-16 RX ADMIN — INSULIN LISPRO 4 UNITS: 100 INJECTION, SOLUTION INTRAVENOUS; SUBCUTANEOUS at 19:57

## 2025-01-16 RX ADMIN — Medication 10 ML: at 09:08

## 2025-01-16 RX ADMIN — ACETAMINOPHEN 650 MG: 325 TABLET ORAL at 05:58

## 2025-01-16 RX ADMIN — DICLOFENAC SODIUM 4 G: 10 GEL TOPICAL at 09:07

## 2025-01-16 RX ADMIN — INSULIN LISPRO 8 UNITS: 100 INJECTION, SOLUTION INTRAVENOUS; SUBCUTANEOUS at 16:18

## 2025-01-16 RX ADMIN — LEVOTHYROXINE SODIUM 50 MCG: 0.05 TABLET ORAL at 09:06

## 2025-01-16 RX ADMIN — SODIUM CHLORIDE: 9 INJECTION, SOLUTION INTRAVENOUS at 10:38

## 2025-01-16 RX ADMIN — INSULIN LISPRO 2 UNITS: 100 INJECTION, SOLUTION INTRAVENOUS; SUBCUTANEOUS at 09:06

## 2025-01-16 RX ADMIN — AZITHROMYCIN MONOHYDRATE 500 MG: 500 INJECTION, POWDER, LYOPHILIZED, FOR SOLUTION INTRAVENOUS at 09:19

## 2025-01-16 RX ADMIN — URSODIOL 600 MG: 300 CAPSULE ORAL at 10:38

## 2025-01-16 RX ADMIN — HEPARIN SODIUM 5000 UNITS: 5000 INJECTION INTRAVENOUS; SUBCUTANEOUS at 09:06

## 2025-01-16 RX ADMIN — PREDNISONE 40 MG: 20 TABLET ORAL at 09:06

## 2025-01-16 RX ADMIN — SODIUM CHLORIDE: 9 INJECTION, SOLUTION INTRAVENOUS at 20:05

## 2025-01-16 RX ADMIN — INSULIN GLARGINE 5 UNITS: 100 INJECTION, SOLUTION SUBCUTANEOUS at 19:56

## 2025-01-16 RX ADMIN — Medication 2 PUFF: at 19:55

## 2025-01-16 RX ADMIN — ACETAMINOPHEN 650 MG: 325 TABLET ORAL at 17:35

## 2025-01-16 RX ADMIN — HEPARIN SODIUM 5000 UNITS: 5000 INJECTION INTRAVENOUS; SUBCUTANEOUS at 19:55

## 2025-01-16 RX ADMIN — WATER 1000 MG: 1 INJECTION INTRAMUSCULAR; INTRAVENOUS; SUBCUTANEOUS at 09:06

## 2025-01-16 RX ADMIN — ACETAMINOPHEN 650 MG: 325 TABLET ORAL at 11:52

## 2025-01-16 RX ADMIN — INSULIN LISPRO 4 UNITS: 100 INJECTION, SOLUTION INTRAVENOUS; SUBCUTANEOUS at 11:52

## 2025-01-16 RX ADMIN — INSULIN GLARGINE 5 UNITS: 100 INJECTION, SOLUTION SUBCUTANEOUS at 09:06

## 2025-01-16 RX ADMIN — ACETAMINOPHEN 650 MG: 325 TABLET ORAL at 00:01

## 2025-01-16 RX ADMIN — Medication 10 ML: at 19:56

## 2025-01-16 RX ADMIN — PREDNISOLONE ACETATE 2 DROP: 10 SUSPENSION/ DROPS OPHTHALMIC at 11:52

## 2025-01-16 RX ADMIN — METHOCARBAMOL 1000 MG: 500 TABLET ORAL at 09:06

## 2025-01-16 ASSESSMENT — PAIN SCALES - GENERAL
PAINLEVEL_OUTOF10: 4
PAINLEVEL_OUTOF10: 0
PAINLEVEL_OUTOF10: 5

## 2025-01-16 ASSESSMENT — PAIN DESCRIPTION - ORIENTATION
ORIENTATION: LOWER
ORIENTATION: LOWER

## 2025-01-16 ASSESSMENT — PAIN DESCRIPTION - PAIN TYPE: TYPE: CHRONIC PAIN

## 2025-01-16 ASSESSMENT — PAIN DESCRIPTION - DESCRIPTORS
DESCRIPTORS: ACHING;DISCOMFORT
DESCRIPTORS: ACHING;DISCOMFORT

## 2025-01-16 ASSESSMENT — PAIN DESCRIPTION - LOCATION
LOCATION: BACK
LOCATION: BACK

## 2025-01-16 NOTE — PLAN OF CARE
Problem: Discharge Planning  Goal: Discharge to home or other facility with appropriate resources  Outcome: Progressing  Flowsheets  Taken 1/15/2025 2100 by Leslie Perkins RN  Discharge to home or other facility with appropriate resources:   Identify barriers to discharge with patient and caregiver   Arrange for needed discharge resources and transportation as appropriate   Identify discharge learning needs (meds, wound care, etc)  Taken 1/15/2025 2024 by Leslie Perkins RN  Discharge to home or other facility with appropriate resources:   Identify barriers to discharge with patient and caregiver   Arrange for needed discharge resources and transportation as appropriate   Identify discharge learning needs (meds, wound care, etc)     Problem: Pain  Goal: Verbalizes/displays adequate comfort level or baseline comfort level  Outcome: Progressing  Flowsheets (Taken 1/15/2025 2024 by Leslie Perkins RN)  Verbalizes/displays adequate comfort level or baseline comfort level:   Assess pain using appropriate pain scale   Administer analgesics based on type and severity of pain and evaluate response   Encourage patient to monitor pain and request assistance     Problem: Safety - Adult  Goal: Free from fall injury  Outcome: Progressing     Problem: Respiratory - Adult  Goal: Achieves optimal ventilation and oxygenation  Outcome: Progressing     Problem: Cardiovascular - Adult  Goal: Maintains optimal cardiac output and hemodynamic stability  Outcome: Progressing  Goal: Absence of cardiac dysrhythmias or at baseline  Outcome: Progressing     Problem: Skin/Tissue Integrity - Adult  Goal: Skin integrity remains intact  Outcome: Progressing  Goal: Oral mucous membranes remain intact  Outcome: Progressing     Problem: Musculoskeletal - Adult  Goal: Return mobility to safest level of function  Outcome: Progressing  Goal: Maintain proper alignment of affected body part  Outcome: Progressing  Goal: Return ADL status to a safe

## 2025-01-16 NOTE — RT PROTOCOL NOTE
RT Inhaler-Nebulizer Bronchodilator Protocol Note    There is a bronchodilator order in the chart from a provider indicating to follow the RT Bronchodilator Protocol and there is an “Initiate RT Inhaler-Nebulizer Bronchodilator Protocol” order as well (see protocol at bottom of note).    CXR Findings:  No results found.    The findings from the last RT Protocol Assessment were as follows:   History Pulmonary Disease: Chronic pulmonary disease  Respiratory Pattern: Regular pattern and RR 12-20 bpm  Breath Sounds: Slightly diminished and/or crackles  Cough: Strong, productive  Indication for Bronchodilator Therapy: Decreased or absent breath sounds  Bronchodilator Assessment Score: 5    Aerosolized bronchodilator medication orders have been revised according to the RT Inhaler-Nebulizer Bronchodilator Protocol below.    Respiratory Therapist to perform RT Therapy Protocol Assessment initially then follow the protocol.  Repeat RT Therapy Protocol Assessment PRN for score 0-3 or on second treatment, BID, and PRN for scores above 3.    No Indications - adjust the frequency to every 6 hours PRN wheezing or bronchospasm, if no treatments needed after 48 hours then discontinue using Per Protocol order mode.     If indication present, adjust the RT bronchodilator orders based on the Bronchodilator Assessment Score as indicated below.  Use Inhaler orders unless patient has one or more of the following: on home nebulizer, not able to hold breath for 10 seconds, is not alert and oriented, cannot activate and use MDI correctly, or respiratory rate 25 breaths per minute or more, then use the equivalent nebulizer order(s) with same Frequency and PRN reasons based on the score.  If a patient is on this medication at home then do not decrease Frequency below that used at home.    0-3 - enter or revise RT bronchodilator order(s) to equivalent RT Bronchodilator order with Frequency of every 4 hours PRN for wheezing or increased work of  breathing using Per Protocol order mode.        4-6 - enter or revise RT Bronchodilator order(s) to two equivalent RT bronchodilator orders with one order with BID Frequency and one order with Frequency of every 4 hours PRN wheezing or increased work of breathing using Per Protocol order mode.        7-10 - enter or revise RT Bronchodilator order(s) to two equivalent RT bronchodilator orders with one order with TID Frequency and one order with Frequency of every 4 hours PRN wheezing or increased work of breathing using Per Protocol order mode.       11-13 - enter or revise RT Bronchodilator order(s) to one equivalent RT bronchodilator order with QID Frequency and an Albuterol order with Frequency of every 4 hours PRN wheezing or increased work of breathing using Per Protocol order mode.      Greater than 13 - enter or revise RT Bronchodilator order(s) to one equivalent RT bronchodilator order with every 4 hours Frequency and an Albuterol order with Frequency of every 2 hours PRN wheezing or increased work of breathing using Per Protocol order mode.     RT to enter RT Home Evaluation for COPD & MDI Assessment order using Per Protocol order mode.    Electronically signed by Marce Villalobos RCP on 1/15/2025 at 7:18 PM

## 2025-01-16 NOTE — PROGRESS NOTES
4 Eyes Skin Assessment     NAME:  Treasure Fatima  YOB: 1945  MEDICAL RECORD NUMBER:  [id number]    The patient is being assessed for  Admission    I agree that at least one RN has performed a thorough Head to Toe Skin Assessment on the patient. ALL assessment sites listed below have been assessed.      Areas assessed by both nurses:    Head, Face, Ears, Shoulders, Back, Chest, Arms, Elbows, Hands, Sacrum. Buttock, Coccyx, Ischium, Legs. Feet and Heels, and Under Medical Devices         Does the Patient have a Wound? No noted wound(s)       Carlos Prevention initiated by RN: No  Wound Care Orders initiated by RN: No    Pressure Injury (Stage 3,4, Unstageable, DTI, NWPT, and Complex wounds) if present, place Wound referral order by RN under : No    New Ostomies, if present place, Ostomy referral order under : No     Nurse 1 eSignature: Electronically signed by Leslie Perkins RN on 1/15/25 at 10:50 PM EST    **SHARE this note so that the co-signing nurse can place an eSignature**    Nurse 2 eSignature: Electronically signed by Lacie Alvarenga RN on 1/18/25 at 6:59 AM EST

## 2025-01-16 NOTE — CARE COORDINATION
Case Management Assessment  Initial Evaluation    Date/Time of Evaluation: 1/16/2025 10:11 AM  Assessment Completed by: Ondina Oh RN    If patient is discharged prior to next notation, then this note serves as note for discharge by case management.    Patient Name: Treasure Fatima                   YOB: 1945  Diagnosis: Pneumonia due to other specified bacteria (HCC) [J15.8]  Pneumonia of both lungs due to infectious organism, unspecified part of lung [J18.9]  Chronic renal impairment, unspecified CKD stage [N18.9]  Hematuria, unspecified type [R31.9]  Type 2 diabetes mellitus with hyperglycemia, unspecified whether long term insulin use (HCC) [E11.65]  Closed compression fracture of body of L1 vertebra (HCC) [S32.010A]                   Date / Time: 1/15/2025  4:56 PM    Patient Admission Status: Inpatient   Readmission Risk (Low < 19, Mod (19-27), High > 27): Readmission Risk Score: 13.8    Current PCP: Keisha Tsang MD  PCP verified by CM? Yes (Keisha Noble MD)    Chart Reviewed: Yes      History Provided by: Patient, Medical Record  Patient Orientation: Alert and Oriented    Patient Cognition: Alert    Hospitalization in the last 30 days (Readmission):  No    If yes, Readmission Assessment in CM Navigator will be completed.    Advance Directives:      Code Status: Full Code   Patient's Primary Decision Maker is: Patient Declined (Legal Next of Kin Remains as Decision Maker)    Primary Decision Maker: ERNESTINA REYNA - [phone]    Secondary Decision Maker: ABHIJEET LACY - Daughter-in-Law - [phone]    Discharge Planning:    Patient lives with: Family Members Type of Home: House  Primary Care Giver: Self  Patient Support Systems include: Family Members   Current Financial resources: Medicare, Medicaid  Current community resources: None  Current services prior to admission: None            Current DME:              Type of Home Care services:  None    ADLS  Prior functional level:

## 2025-01-16 NOTE — PROGRESS NOTES
[facility] Medicine Progress Note  V 1.6      Date of Admission: 1/15/2025    Hospital Day: 2      Chief Admission Complaint:  low back pain    Subjective:  low back pain    Presenting Admission History:       An 79-year-old female presented to [facility] with complaints of low back pain. Her past medical history includes primary biliary cirrhosis, hypothyroidism, DM, COPD, GERD, and osteoarthritis.  The patient reported experiencing atraumatic low back pain for the past couple of weeks. She denied any numbness, tingling, or new weakness in her lower extremities. Additionally, she noted an increase in coughing and a slight worsening of shortness of breath, but otherwise, she had no other symptoms. She denied any recent falls or injuries to her back.  Emergency department workup revealed mild leukocytosis. A CT scan of the chest showed left lower lobe infiltrates, and a CT scan of the lumbar spine revealed an L1 vertebral fracture, which could be acute. Further evaluation in the ED showed no other acute findings. The patient was admitted for treatment of pneumonia and pain management.      Assessment/Plan:      Current Principal Problem:  Pneumonia due to other specified bacteria (HCC)    Pneumonia   - Likely Gram-positive community-acquired pneumonia, possibly caused by Streptococcus pneumoniae.   - Influenza A, B and Covid negative  - The patient was started on empiric ceftriaxone and azithromycin on 1/15.  - Currently on 3L NC  - Patient not on any oxygen at home  - A CT scan of the chest showed left lower lobe infiltrates   - Started on Azithromycin and Ceftriaxone for CAP  - Patient was   - Scheduled/PRN Nebulizers     COPD   - Patient is not on home oxygen. Currently controlled on home medications, which will be continued. The patient is experiencing an acute exacerbation.  - Continue DuoNeb and prednisone for 5 days.    L1 Vertebral Fracture   - Likely atraumatic, possibly related to  (any 2 required for High level billing)    A. Problems (any 1)  [x] Acute/Chronic Illness/injury posing ongoing threat to life and/or bodily function without ongoing treatment    [] Severe exacerbation of chronic illness    --------------------------------------------------  B. Risk of Treatment (any 1)    [] Drugs/treatments that require intensive monitoring for toxicity    [] IV ABX (Vancomycin, Aminoglycosides, etc)     [] Post-Cath/Contrast study requiring serial monitoring    [] IV Narcotic analgesia    [] Aggressive IV diuresis    [] Hypertonic Saline    [] Critical electrolyte abnormalities requiring IV replacement    [x] Insulin - Scheduled/SSI or Insulin gtt. Patient on Scheduled and/or SSI requiring close serial monitoring of glucose w/ POCT, as documented in this note and/or the medical record, personally reviewed by me on 1/16/2025, for dose adjustments and toxic effect including hypoglycemic ADR w/ hypoglycemic rescue as ordered.     [] Anticoagulation (Heparin gtt or Coumadin - other anticoagulants in special circumstances)    [] Cardiac Medications (IV Amiodarone/Diltiazem, Tikosyn, etc)    [] Hemodialysis    [] Other -    [] Change in code status    [] Decision to escalate care    [] Major surgery/procedure with associated risk factors    --------------------------------------------------  C. Data (any 2)    [] Data Review (any 3)    [] Consultant notes from yesterday/today    [x] All available current labs reviewed interpreted for clinical significance    [x] Appropriate follow-up labs were ordered  [] Collateral history obtained     [x] Independent Interpretation of tests (any 1)    [x] Telemetry (Rhythm Strip) personally reviewed and interpreted        [] Imaging personally reviewed and interpreted     [x] Discussion (any 1)  [x] Multi-Disciplinary Rounds with Case Management  [] Discussed management of the case with           Labs:  Personally reviewed on 1/16/2025 and interpreted for clinical

## 2025-01-16 NOTE — ACP (ADVANCE CARE PLANNING)
ACP conversation. Pt would like to speak with her dtr in law before completing AD. CH will be available to assist,

## 2025-01-16 NOTE — PLAN OF CARE
Problem: Discharge Planning  Goal: Discharge to home or other facility with appropriate resources  1/16/2025 1014 by Alie Murrell RN  Outcome: Progressing  1/16/2025 0319 by Dinora Phillips RN  Outcome: Progressing  Flowsheets  Taken 1/15/2025 2100 by Leslie Perkins RN  Discharge to home or other facility with appropriate resources:   Identify barriers to discharge with patient and caregiver   Arrange for needed discharge resources and transportation as appropriate   Identify discharge learning needs (meds, wound care, etc)  Taken 1/15/2025 2024 by Leslie Perkins RN  Discharge to home or other facility with appropriate resources:   Identify barriers to discharge with patient and caregiver   Arrange for needed discharge resources and transportation as appropriate   Identify discharge learning needs (meds, wound care, etc)     Problem: Pain  Goal: Verbalizes/displays adequate comfort level or baseline comfort level  1/16/2025 1014 by Alie Murrell RN  Outcome: Progressing  1/16/2025 0319 by Dinora Phillips RN  Outcome: Progressing  Flowsheets (Taken 1/15/2025 2024 by Leslie Perkins RN)  Verbalizes/displays adequate comfort level or baseline comfort level:   Assess pain using appropriate pain scale   Administer analgesics based on type and severity of pain and evaluate response   Encourage patient to monitor pain and request assistance     Problem: Safety - Adult  Goal: Free from fall injury  1/16/2025 1014 by Alie Murrell RN  Outcome: Progressing  1/16/2025 0319 by Dinora Phillips RN  Outcome: Progressing     Problem: Respiratory - Adult  Goal: Achieves optimal ventilation and oxygenation  1/16/2025 1014 by Alie Murrell RN  Outcome: Progressing  1/16/2025 0319 by Dinora Phillips RN  Outcome: Progressing     Problem: Cardiovascular - Adult  Goal: Maintains optimal cardiac output and hemodynamic stability  1/16/2025 1014 by Alie Murrell RN  Outcome: Progressing  1/16/2025 0319 by Dinora Phillips

## 2025-01-16 NOTE — PROGRESS NOTES
Physical Therapy  Facility/Department: [place] [address] [facility]  Physical Therapy Initial Assessment/Treatment     Name: Treasure Fatima  : 1945  MRN: [id number]  Date of Service: 2025    Discharge Recommendations:  24 hour supervision or assist, Home with Home health PT   PT Equipment Recommendations  Equipment Needed: No  Other: Has RW if needed at DC      Patient Diagnosis(es): The primary encounter diagnosis was Pneumonia of both lungs due to infectious organism, unspecified part of lung. Diagnoses of Closed compression fracture of body of L1 vertebra (HCC), Chronic renal impairment, unspecified CKD stage, Type 2 diabetes mellitus with hyperglycemia, unspecified whether long term insulin use (HCC), and Hematuria, unspecified type were also pertinent to this visit.  Past Medical History:  has a past medical history of Acid reflux, Arthritis, COPD (chronic obstructive pulmonary disease) (HCC), Pneumonia, Primary biliary cirrhosis (HCC), and Thyroid disease.  Past Surgical History:  has a past surgical history that includes Hysterectomy; Colonoscopy (N/A, 3/12/2021); Upper gastrointestinal endoscopy (N/A, 3/12/2021); Upper gastrointestinal endoscopy (N/A, 2021); Upper gastrointestinal endoscopy (N/A, 2021); Upper gastrointestinal endoscopy (2021); Upper gastrointestinal endoscopy (N/A, 2022); Upper gastrointestinal endoscopy (2022); Upper gastrointestinal endoscopy (N/A, 2023); Upper gastrointestinal endoscopy (2023); Upper gastrointestinal endoscopy (N/A, 8/10/2023); Foot Debridement (Left, 2024); and Upper gastrointestinal endoscopy (N/A, 2024).    Assessment  Body Structures, Functions, Activity Limitations Requiring Skilled Therapeutic Intervention: Decreased functional mobility ;Decreased endurance;Decreased posture;Decreased balance;Decreased strength  Assessment: Pt to [facility] with diagnosis of pneumonia. PTA pt lives with her granddaughter in a 2  89/54  Blood Pressure Standin/58  Comment: BP supine following ther ex: 98/50     Observation/Palpation  Posture: Fair  Gross Assessment  AROM: Within functional limits  Strength: Generally decreased, functional        Bed Mobility Training  Bed Mobility Training: Yes  Supine to Sit: Modified independent  Sit to Supine: Modified independent  Scooting: Modified independent  Balance  Sitting: Intact  Standing: Impaired (RW)  Standing - Static: Constant support;Fair  Standing - Dynamic: Constant support;Fair  Transfer Training  Transfer Training: Yes  Overall Level of Assistance: Contact-guard assistance  Interventions: Safety awareness training;Verbal cues  Sit to Stand: Contact-guard assistance;Additional time;Adaptive equipment  Stand to Sit: Contact-guard assistance;Additional time;Adaptive equipment  Gait  Gait Training: Yes  Overall Level of Assistance: Stand-by assistance;Contact-guard assistance (CGA progressing to SBA)  Assistive Device: Walker, rolling  Interventions: Safety awareness training;Verbal cues  Base of Support: Narrowed  Step Length: Left shortened;Right shortened  Gait Abnormalities: Trunk sway increased;Decreased step clearance       Exercise Treatment: Supine BLE exercises for low BP: AP 15x, heel slides 10x, hip abduction 10x, glut sets 15x, SLR 10x       OutComes Score  AM-PAC - Mobility    -PAC Basic Mobility - Inpatient   How much help is needed turning from your back to your side while in a flat bed without using bedrails?: None  How much help is needed moving from lying on your back to sitting on the side of a flat bed without using bedrails?: None  How much help is needed moving to and from a bed to a chair?: A Little  How much help is needed standing up from a chair using your arms?: A Little  How much help is needed walking in hospital room?: A Little  How much help is needed climbing 3-5 steps with a railing?: A Little  -[facility] Inpatient Mobility Raw Score : 20  AM-PAC Inpatient

## 2025-01-16 NOTE — H&P
[facility] Medicine History & Physical    V 1.6    Date of Admission: 1/15/2025    Date of Service:  Pt seen/examined on 1/15/2025    [x]Admitted to Inpatient with expected LOS greater than two midnights due to medical therapy.  []Placed in Observation status.    Chief Admission Complaint: Low back pain    Presenting Admission History:      79 y.o. female who presented to [facility] with low back pain.  PMHx significant for primary biliary cirrhosis, hypothyroidism, COPD, GERD and osteoarthritis.      Patient presents to ED due to atraumatic low back pain which has been going on for couple of weeks.  Patient denies any numbness or tingling or new weakness in the lower extremity.  Patient also did report some increased cough and minimal worsening of shortness of breath.  Otherwise no other symptoms.  Denies any recent falls or injuries to her back.  In the ED workup was significant for mild leukocytosis CT chest showing left lower lobe infiltrates and CT lumbar showing L1 vertebral fracture which could be acute.  Otherwise further evaluation in ED was nonacute.  Patient is being admitted for treatment of pneumonia and pain management.    Assessment/Plan:      Current Principal Problem:  Pneumonia due to other specified bacteria (HCC)    PNA - likely Gram Positive Community Acquired Pneumonia, possibly due to Strep Pneumonia.  Started on empiric ceftriaxone and azithromycin on 1/15.     COPD - w/out chronic hypoxic respiratory failure on no baseline home O2.  Controlled on home medication regimen - continued.  With acute exacerbation      -This seems to be a mild pneumonia.  Will treat empirically.  Not septic.  -Continue ceftriaxone and azithromycin for total of 5 and 3 days respectively.  -Continue DuoNeb and prednisone for total of 5 days.    L1 vertebral fracture-this seems to be atraumatic this could be due to undiagnosed osteoporosis.    -No neurological deficits.  -Try conservative pain  MD at [facility] ENDOSCOPY    FOOT DEBRIDEMENT Left 9/18/2024    LEFT FOOT DEBRIDEMENT INCISION AND DRAINAGE WITH PARTIAL FIFTH RAY AMPUTATION. performed by Lelo Montilla MD at Health system OR    HYSTERECTOMY (CERVIX STATUS UNKNOWN)      UPPER GASTROINTESTINAL ENDOSCOPY N/A 3/12/2021    EGD BAND LIGATION performed by Niko Farley MD at [facility] ENDOSCOPY    UPPER GASTROINTESTINAL ENDOSCOPY N/A 11/24/2021    EGD BAND LIGATION performed by Violet Kelly MD at [facility] ENDOSCOPY    UPPER GASTROINTESTINAL ENDOSCOPY N/A 12/21/2021    EGD performed by Violet Kelly MD at [facility] ENDOSCOPY    UPPER GASTROINTESTINAL ENDOSCOPY  12/21/2021    EGD BAND LIGATION performed by Violet Kelly MD at [facility] ENDOSCOPY    UPPER GASTROINTESTINAL ENDOSCOPY N/A 1/18/2022    EGD DIAGNOSTIC ONLY performed by Violet Kelly MD at [facility] ENDOSCOPY    UPPER GASTROINTESTINAL ENDOSCOPY  1/18/2022    EGD FOREIGN BODY REMOVAL performed by Violet Kelly MD at [facility] ENDOSCOPY    UPPER GASTROINTESTINAL ENDOSCOPY N/A 1/25/2023    EGD performed by Gian Gonsales MD at [facility] ENDOSCOPY    UPPER GASTROINTESTINAL ENDOSCOPY  1/25/2023    EGD BAND LIGATION performed by Gian Gonsales MD at [facility] ENDOSCOPY    UPPER GASTROINTESTINAL ENDOSCOPY N/A 8/10/2023    ESOPHAGOGASTRODUODENOSCOPY performed by Violet Kelly MD at [facility] ENDOSCOPY    UPPER GASTROINTESTINAL ENDOSCOPY N/A 11/12/2024    ESOPHAGOGASTRODUODENOSCOPY performed by Violet Kelly MD at [facility] ENDOSCOPY       Medications Prior to Admission:   Prior to Admission medications    Medication Sig Start Date End Date Taking? Authorizing Provider   ursodiol ([place]) 500 MG tablet Take 1.5 tablets by mouth daily 11/12/24   Violet Kelly MD   fluticasone (FLONASE ALLERGY RELIEF) 50 MCG/ACT nasal spray 1 spray by Nasal route daily    Provider, MD Ladi   TREHAIR ELLIPTA 100-62.5-25 MCG/ACT AEPB inhaler Inhale 1 puff into the lungs daily    Provider, MD Ladi   prednisoLONE

## 2025-01-16 NOTE — ED NOTES
Treasure Fatima is a 79 y.o. female admitted for  Principal Problem:    Pneumonia due to other specified bacteria (HCC)  Resolved Problems:    * No resolved hospital problems. *  .   Patient Home via EMS transportation with   Chief Complaint   Patient presents with    Back Pain     For 2 weeks, NKI, lower back pain b/l   .  Patient is alert and Person, Place, Time, and Situation  Patient's baseline mobility: Baseline Mobility: Independent   Code Status: Full Code   Cardiac Rhythm:       Is patient on baseline Oxygen: no how many Liters:   Abnormal Assessment Findings:     Isolation:       NIH Score:    C-SSRS: Risk of Suicide: No Risk  Bedside swallow:        Active LDA's:   Peripheral IV 01/15/25 Left Antecubital (Active)   Site Assessment Clean, dry & intact 01/15/25 1716   Line Status Blood return noted 01/15/25 1716         Family/Caregiver Present no Any Concerns: no   Restraints no  Sitter no         Vitals:      Vitals:    01/15/25 1747 01/15/25 1816 01/15/25 1816 01/15/25 1916   BP:  (!) 112/57  (!) 110/53   Pulse: 85 85  83   Resp: 23 19  20   Temp:       TempSrc:       SpO2: 93% 92%  93%   Weight:   44.9 kg (99 lb)    Height:   1.626 m (5' 4\")        Last documented pain score (0-10 scale) Pain Level: 7  Pain medication administered See MAR .    Pertinent or High Risk Medications/Drips: No.    Pending Blood Product Administration: no    Abnormal labs:   Abnormal Labs Reviewed   COMPREHENSIVE METABOLIC PANEL W/ REFLEX TO MG FOR LOW K - Abnormal; Notable for the following components:       Result Value    Glucose 315 (*)     BUN 21 (*)     Creatinine 1.3 (*)     Est, Glom Filt Rate 41 (*)     Albumin 2.9 (*)     Albumin/Globulin Ratio 0.7 (*)     Total Bilirubin 1.1 (*)     Alkaline Phosphatase 171 (*)     All other components within normal limits    Narrative:     CALL  Devine  [facility] tel. [phone],  Rejected Test Name/Called to: no answer x 3, 01/15/2025 16:00, by JUDSON   URINALYSIS WITH REFLEX TO CULTURE -

## 2025-01-16 NOTE — PROGRESS NOTES
Comprehensive Nutrition Assessment    Type and Reason for Visit:  Initial (Low BMI)    Nutrition Recommendations/Plan:   Continue carb controlled diet  Add Glucerna ONS daily, prefers chocolate - monitor need to adjust  Encourage po intakes  Monitor po intakes, nutrition adequacy, weights, pertinent labs, BMs     Malnutrition Assessment:  Malnutrition Status:  Severe malnutrition (01/16/25 1415)    Context:  Chronic Illness     Findings of the 6 clinical characteristics of malnutrition:  Energy Intake:  Mild decrease in energy intake  Weight Loss:  No weight loss (Chronic low BMI)     Body Fat Loss:  Severe body fat loss Orbital, Buccal region   Muscle Mass Loss:  Severe muscle mass loss Temples (temporalis), Clavicles (pectoralis & deltoids), Hand (interosseous)  Fluid Accumulation:  No fluid accumulation     Strength:  Not Performed    Nutrition Assessment:    Pt seen d/t low BMI. Pt with PMHx of primary biliary cirrhosis, hypothyroidism, DM, COPD, GERD, and osteoarthritis, admitted with c/o lower back pain, cough, increasing SOB. Found to have pneumonia. Currently on a carb controlled diet. PO intakes % per EMR. Pt reports that her appetite has been decreased over the past few days, improving now. Pt endorses UBW around 90 lbs. CBW is 99 lb. Pt noted to have severe muscle and fat wasting. Pt reports that she drinks 1-2 Boost per day at home. Favorable to adding ONS during inpatient stay. Will add to order. BG elevated, on steroids currently. Will order updated HgbA1c. Will continue current diet and monitor.    Nutrition Related Findings:    Labs reviewed.  - 315 x 24 hr. No edema. Skin intact. Wound Type: None       Current Nutrition Intake & Therapies:    Average Meal Intake: 51-75%, %  Average Supplements Intake: None Ordered  ADULT DIET; Regular; 5 carb choices (75 gm/meal)    Anthropometric Measures:  Height: 162.6 cm (5' 4\")  Ideal Body Weight (IBW): 120 lbs (55 kg)       Current Body

## 2025-01-16 NOTE — PROGRESS NOTES
Patient was admitted to A1 room 116. Vital signs and assessment are stable at the time of arrival. Patient was oriented to her room and call light. Telemetry monitor in place. Skin check performed by two nurses. No complications at time of arrival.

## 2025-01-16 NOTE — PROGRESS NOTES
Occupational Therapy  Facility/Department: [place] [address] [facility]  Occupational Therapy Initial Assessment/Treatment Note    Name: Treasure Fatima  : 1945  MRN: [id number]  Date of Service: 2025    Discharge Recommendations:  Home with Home health OT, 24 hour supervision or assist  OT Equipment Recommendations  Equipment Needed: No       Patient Diagnosis(es): The primary encounter diagnosis was Pneumonia of both lungs due to infectious organism, unspecified part of lung. Diagnoses of Closed compression fracture of body of L1 vertebra (HCC), Chronic renal impairment, unspecified CKD stage, Type 2 diabetes mellitus with hyperglycemia, unspecified whether long term insulin use (HCC), and Hematuria, unspecified type were also pertinent to this visit.  Past Medical History:  has a past medical history of Acid reflux, Arthritis, COPD (chronic obstructive pulmonary disease) (HCC), Pneumonia, Primary biliary cirrhosis (HCC), and Thyroid disease.  Past Surgical History:  has a past surgical history that includes Hysterectomy; Colonoscopy (N/A, 3/12/2021); Upper gastrointestinal endoscopy (N/A, 3/12/2021); Upper gastrointestinal endoscopy (N/A, 2021); Upper gastrointestinal endoscopy (N/A, 2021); Upper gastrointestinal endoscopy (2021); Upper gastrointestinal endoscopy (N/A, 2022); Upper gastrointestinal endoscopy (2022); Upper gastrointestinal endoscopy (N/A, 2023); Upper gastrointestinal endoscopy (2023); Upper gastrointestinal endoscopy (N/A, 8/10/2023); Foot Debridement (Left, 2024); and Upper gastrointestinal endoscopy (N/A, 2024).    Treatment Diagnosis: Pneumonia due to other specified bacteria (HCC)      Assessment  Performance deficits / Impairments: Decreased functional mobility ;Decreased endurance;Decreased ADL status;Decreased balance;Decreased strength  Assessment: 78 y/o female presenting to [facility] with PNA. PLOF includes living at home with  Training: Yes  Overall Level of Assistance: Contact-guard assistance  Interventions: Safety awareness training;Verbal cues  Sit to Stand: Contact-guard assistance;Additional time;Adaptive equipment  Stand to Sit: Contact-guard assistance;Additional time;Adaptive equipment  Gait  Gait Training: Yes  Overall Level of Assistance: Stand-by assistance;Contact-guard assistance (CGA progressing to SBA)  Assistive Device: Walker, rolling  Interventions: Safety awareness training;Verbal cues  Base of Support: Narrowed  Step Length: Left shortened;Right shortened  Gait Abnormalities: Trunk sway increased;Decreased step clearance     AROM: Generally decreased, functional (not formally assessed, noted during ADLs and funcional mobility)  Strength: Generally decreased, functional (not formally assessed, noted during ADLs and funcional mobility)  Coordination: Within functional limits  Tone: Normal  Sensation: Intact  ADL  Grooming: Stand by assistance  Grooming Skilled Clinical Factors: facial/hand hygiene standing at sink  Toileting: Contact guard assistance  Toileting Skilled Clinical Factors: seated on toilet, [facility] for clothes mgmt in standing  Functional Mobility: Contact guard assistance;Stand by assistance  Functional Mobility Skilled Clinical Factors: to/from bathroom and in hallway with RW, CGA progressing to SBA     Activity Tolerance  Activity Tolerance: Patient tolerated evaluation without incident;Patient tolerated treatment well        Vision  Vision: Impaired  Vision Exceptions: Wears glasses for reading  Hearing  Hearing: Exceptions to WFL  Hearing Exceptions: Hard of hearing/hearing concerns  Orientation  Overall Orientation Status: Within Normal Limits  Orientation Level: Oriented X4               Exercise Treatment: BUE x10 each, AROM: wrist flexion/extension, wrist ulnar/radial deviation, forearm supination/pronation, elbow flexion/extension  Education Given To: Patient;Family (granddaughter)  Education

## 2025-01-17 LAB
ANION GAP SERPL CALCULATED.3IONS-SCNC: 8 MMOL/L (ref 3–16)
BASOPHILS # BLD: 0 K/UL (ref 0–0.2)
BASOPHILS NFR BLD: 0.1 %
BUN SERPL-MCNC: 28 MG/DL (ref 7–20)
CALCIUM SERPL-MCNC: 8.7 MG/DL (ref 8.3–10.6)
CHLORIDE SERPL-SCNC: 107 MMOL/L (ref 99–110)
CO2 SERPL-SCNC: 19 MMOL/L (ref 21–32)
CREAT SERPL-MCNC: 1.4 MG/DL (ref 0.6–1.2)
DEPRECATED RDW RBC AUTO: 14.6 % (ref 12.4–15.4)
EOSINOPHIL # BLD: 0 K/UL (ref 0–0.6)
EOSINOPHIL NFR BLD: 0 %
GFR SERPLBLD CREATININE-BSD FMLA CKD-EPI: 38 ML/MIN/{1.73_M2}
GLUCOSE BLD-MCNC: 166 MG/DL (ref 70–99)
GLUCOSE BLD-MCNC: 189 MG/DL (ref 70–99)
GLUCOSE BLD-MCNC: 206 MG/DL (ref 70–99)
GLUCOSE BLD-MCNC: 250 MG/DL (ref 70–99)
GLUCOSE SERPL-MCNC: 203 MG/DL (ref 70–99)
HCT VFR BLD AUTO: 28.3 % (ref 36–48)
HGB BLD-MCNC: 9.7 G/DL (ref 12–16)
LYMPHOCYTES # BLD: 0.4 K/UL (ref 1–5.1)
LYMPHOCYTES NFR BLD: 5.4 %
MCH RBC QN AUTO: 31.5 PG (ref 26–34)
MCHC RBC AUTO-ENTMCNC: 34.2 G/DL (ref 31–36)
MCV RBC AUTO: 92.3 FL (ref 80–100)
MONOCYTES # BLD: 0.3 K/UL (ref 0–1.3)
MONOCYTES NFR BLD: 4.4 %
NEUTROPHILS # BLD: 6.2 K/UL (ref 1.7–7.7)
NEUTROPHILS NFR BLD: 90.1 %
PERFORMED ON: ABNORMAL
PLATELET # BLD AUTO: 98 K/UL (ref 135–450)
PMV BLD AUTO: 8.3 FL (ref 5–10.5)
POTASSIUM SERPL-SCNC: 4.3 MMOL/L (ref 3.5–5.1)
RBC # BLD AUTO: 3.07 M/UL (ref 4–5.2)
SODIUM SERPL-SCNC: 134 MMOL/L (ref 136–145)
WBC # BLD AUTO: 6.9 K/UL (ref 4–11)

## 2025-01-17 PROCEDURE — 6370000000 HC RX 637 (ALT 250 FOR IP): Performed by: STUDENT IN AN ORGANIZED HEALTH CARE EDUCATION/TRAINING PROGRAM

## 2025-01-17 PROCEDURE — 85025 COMPLETE CBC W/AUTO DIFF WBC: CPT

## 2025-01-17 PROCEDURE — 1200000000 HC SEMI PRIVATE

## 2025-01-17 PROCEDURE — 2700000000 HC OXYGEN THERAPY PER DAY

## 2025-01-17 PROCEDURE — 6360000002 HC RX W HCPCS: Performed by: STUDENT IN AN ORGANIZED HEALTH CARE EDUCATION/TRAINING PROGRAM

## 2025-01-17 PROCEDURE — 6370000000 HC RX 637 (ALT 250 FOR IP)

## 2025-01-17 PROCEDURE — 94640 AIRWAY INHALATION TREATMENT: CPT

## 2025-01-17 PROCEDURE — 36415 COLL VENOUS BLD VENIPUNCTURE: CPT

## 2025-01-17 PROCEDURE — 2500000003 HC RX 250 WO HCPCS: Performed by: STUDENT IN AN ORGANIZED HEALTH CARE EDUCATION/TRAINING PROGRAM

## 2025-01-17 PROCEDURE — 6370000000 HC RX 637 (ALT 250 FOR IP): Performed by: NURSE PRACTITIONER

## 2025-01-17 PROCEDURE — 80048 BASIC METABOLIC PNL TOTAL CA: CPT

## 2025-01-17 PROCEDURE — 94761 N-INVAS EAR/PLS OXIMETRY MLT: CPT

## 2025-01-17 PROCEDURE — 2580000003 HC RX 258: Performed by: STUDENT IN AN ORGANIZED HEALTH CARE EDUCATION/TRAINING PROGRAM

## 2025-01-17 PROCEDURE — 2580000003 HC RX 258

## 2025-01-17 RX ORDER — MECOBALAMIN 5000 MCG
10 TABLET,DISINTEGRATING ORAL NIGHTLY
Status: DISCONTINUED | OUTPATIENT
Start: 2025-01-17 | End: 2025-01-18 | Stop reason: HOSPADM

## 2025-01-17 RX ADMIN — POLYETHYLENE GLYCOL 3350 17 G: 17 POWDER, FOR SOLUTION ORAL at 15:01

## 2025-01-17 RX ADMIN — ACETAMINOPHEN 650 MG: 325 TABLET ORAL at 23:20

## 2025-01-17 RX ADMIN — PREDNISONE 40 MG: 20 TABLET ORAL at 08:37

## 2025-01-17 RX ADMIN — Medication 10 MG: at 23:20

## 2025-01-17 RX ADMIN — ACETAMINOPHEN 650 MG: 325 TABLET ORAL at 06:16

## 2025-01-17 RX ADMIN — WATER 1000 MG: 1 INJECTION INTRAMUSCULAR; INTRAVENOUS; SUBCUTANEOUS at 08:38

## 2025-01-17 RX ADMIN — PREDNISOLONE ACETATE 2 DROP: 10 SUSPENSION/ DROPS OPHTHALMIC at 08:38

## 2025-01-17 RX ADMIN — AZITHROMYCIN MONOHYDRATE 500 MG: 500 INJECTION, POWDER, LYOPHILIZED, FOR SOLUTION INTRAVENOUS at 08:46

## 2025-01-17 RX ADMIN — PREDNISOLONE ACETATE 2 DROP: 10 SUSPENSION/ DROPS OPHTHALMIC at 22:02

## 2025-01-17 RX ADMIN — SODIUM CHLORIDE: 9 INJECTION, SOLUTION INTRAVENOUS at 06:15

## 2025-01-17 RX ADMIN — TIOTROPIUM BROMIDE INHALATION SPRAY 2 PUFF: 3.12 SPRAY, METERED RESPIRATORY (INHALATION) at 08:11

## 2025-01-17 RX ADMIN — Medication 10 ML: at 22:04

## 2025-01-17 RX ADMIN — Medication 10 ML: at 08:38

## 2025-01-17 RX ADMIN — HEPARIN SODIUM 5000 UNITS: 5000 INJECTION INTRAVENOUS; SUBCUTANEOUS at 22:00

## 2025-01-17 RX ADMIN — Medication 2 PUFF: at 20:43

## 2025-01-17 RX ADMIN — ACETAMINOPHEN 650 MG: 325 TABLET ORAL at 17:15

## 2025-01-17 RX ADMIN — Medication 2 PUFF: at 08:11

## 2025-01-17 RX ADMIN — INSULIN LISPRO 4 UNITS: 100 INJECTION, SOLUTION INTRAVENOUS; SUBCUTANEOUS at 21:59

## 2025-01-17 RX ADMIN — HEPARIN SODIUM 5000 UNITS: 5000 INJECTION INTRAVENOUS; SUBCUTANEOUS at 08:37

## 2025-01-17 RX ADMIN — INSULIN LISPRO 4 UNITS: 100 INJECTION, SOLUTION INTRAVENOUS; SUBCUTANEOUS at 08:37

## 2025-01-17 RX ADMIN — LEVOTHYROXINE SODIUM 50 MCG: 0.05 TABLET ORAL at 08:37

## 2025-01-17 RX ADMIN — PREDNISOLONE ACETATE 2 DROP: 10 SUSPENSION/ DROPS OPHTHALMIC at 12:19

## 2025-01-17 RX ADMIN — PREDNISOLONE ACETATE 2 DROP: 10 SUSPENSION/ DROPS OPHTHALMIC at 17:15

## 2025-01-17 RX ADMIN — ACETAMINOPHEN 650 MG: 325 TABLET ORAL at 00:10

## 2025-01-17 RX ADMIN — INSULIN LISPRO 8 UNITS: 100 INJECTION, SOLUTION INTRAVENOUS; SUBCUTANEOUS at 17:15

## 2025-01-17 RX ADMIN — INSULIN GLARGINE 5 UNITS: 100 INJECTION, SOLUTION SUBCUTANEOUS at 21:59

## 2025-01-17 RX ADMIN — ACETAMINOPHEN 650 MG: 325 TABLET ORAL at 12:18

## 2025-01-17 RX ADMIN — URSODIOL 600 MG: 300 CAPSULE ORAL at 08:51

## 2025-01-17 ASSESSMENT — PAIN SCALES - GENERAL
PAINLEVEL_OUTOF10: 0
PAINLEVEL_OUTOF10: 0
PAINLEVEL_OUTOF10: 2
PAINLEVEL_OUTOF10: 0
PAINLEVEL_OUTOF10: 5
PAINLEVEL_OUTOF10: 3
PAINLEVEL_OUTOF10: 3
PAINLEVEL_OUTOF10: 2

## 2025-01-17 ASSESSMENT — PAIN DESCRIPTION - ORIENTATION: ORIENTATION: LOWER

## 2025-01-17 ASSESSMENT — PAIN DESCRIPTION - LOCATION: LOCATION: BACK

## 2025-01-17 NOTE — CARE COORDINATION
Cm update; LOS # 2; Followed by IM, PT/OT, Spiritual care. Patient will [facility] orders prior to discharge for PT/OT. Lives on main floor of 2 story house with family. Will follow.Ondina Oh RN

## 2025-01-17 NOTE — PROGRESS NOTES
[facility] Medicine Progress Note  V 1.6      Date of Admission: 1/15/2025    Hospital Day: 3      Chief Admission Complaint:  low back pain    Subjective:  low back pain    Presenting Admission History:       An 79-year-old female presented to [facility] with complaints of low back pain. Her past medical history includes primary biliary cirrhosis, hypothyroidism, DM, COPD, GERD, and osteoarthritis.  The patient reported experiencing atraumatic low back pain for the past couple of weeks. She denied any numbness, tingling, or new weakness in her lower extremities. Additionally, she noted an increase in coughing and a slight worsening of shortness of breath, but otherwise, she had no other symptoms. She denied any recent falls or injuries to her back.  Emergency department workup revealed mild leukocytosis. A CT scan of the chest showed left lower lobe infiltrates, and a CT scan of the lumbar spine revealed an L1 vertebral fracture, which could be acute. Further evaluation in the ED showed no other acute findings. The patient was admitted for treatment of pneumonia and pain management.      Assessment/Plan:      Current Principal Problem:  Pneumonia due to other specified bacteria (HCC)    Pneumonia  - Likely Gram Positive Community Acquired Pneumonia, possibly due to Strep Pneumonia.    - Noted on CT with LLL infiltrated  - Continue steroids for a total of 5 days   - The patient was started on empiric ceftriaxone and azithromycin on 1/15.  - Currently on 1L NC  - Started on empiric ceftriaxone and azithromycin on 1/15.      - Azithromycin EOT 1/18/25    - Ceftriaxone   EOT  1/20/25     Hypotension - resolving  - Secondary to above  - mIVF discontinued  - Monitor BP after discontinuing fluids     Acute Hypoxic Respiratory Failure - POA    - Presence of clinical respiratory distress w/ tachypnea/SOB w/ use of accessory muscles to breath.   - On supplemental O2 as ordered and wean as tolerated.   -

## 2025-01-17 NOTE — PLAN OF CARE
Problem: Discharge Planning  Goal: Discharge to home or other facility with appropriate resources  1/16/2025 2057 by Leslie Perkins RN  Outcome: Progressing  Flowsheets (Taken 1/16/2025 2054)  Discharge to home or other facility with appropriate resources:   Identify barriers to discharge with patient and caregiver   Arrange for needed discharge resources and transportation as appropriate   Identify discharge learning needs (meds, wound care, etc)  1/16/2025 1014 by Alie Murrell RN  Outcome: Progressing     Problem: Pain  Goal: Verbalizes/displays adequate comfort level or baseline comfort level  1/16/2025 2057 by Leslie Perkins RN  Outcome: Progressing  Flowsheets (Taken 1/16/2025 1615)  Verbalizes/displays adequate comfort level or baseline comfort level:   Assess pain using appropriate pain scale   Administer analgesics based on type and severity of pain and evaluate response   Encourage patient to monitor pain and request assistance   Implement non-pharmacological measures as appropriate and evaluate response  1/16/2025 1014 by Alie Murrell RN  Outcome: Progressing     Problem: Safety - Adult  Goal: Free from fall injury  1/16/2025 2057 by Leslie Perkins RN  Outcome: Progressing  1/16/2025 1014 by Alie Murrell RN  Outcome: Progressing     Problem: Respiratory - Adult  Goal: Achieves optimal ventilation and oxygenation  1/16/2025 2057 by Leslie Perkins RN  Outcome: Progressing  Flowsheets (Taken 1/16/2025 2054)  Achieves optimal ventilation and oxygenation:   Assess for changes in mentation and behavior   Position to facilitate oxygenation and minimize respiratory effort   Assess for changes in respiratory status  1/16/2025 1014 by Alie Murrell RN  Outcome: Progressing     Problem: Cardiovascular - Adult  Goal: Maintains optimal cardiac output and hemodynamic stability  1/16/2025 2057 by Leslie Perkins RN  Outcome: Progressing  Flowsheets (Taken 1/16/2025 2054)  Maintains optimal cardiac  stability and activity tolerance for standing, transferring and ambulating with or without assistive devices  1/16/2025 1014 by Alie Murrell, RN  Outcome: Progressing  Goal: Maintain proper alignment of affected body part  1/16/2025 2057 by Leslie Perkins RN  Outcome: Progressing  Flowsheets (Taken 1/16/2025 2054)  Maintain proper alignment of affected body part:   Support and protect limb and body alignment per provider's orders   Instruct and reinforce with patient and family use of appropriate assistive device and precautions (e.g. spinal or hip dislocation precautions)  1/16/2025 1014 by Alie Murrell, RN  Outcome: Progressing  Goal: Return ADL status to a safe level of function  Outcome: Progressing  Flowsheets (Taken 1/16/2025 2054)  Return ADL Status to a Safe Level of Function:   Assess activities of daily living deficits and provide assistive devices as needed   Administer medication as ordered     Problem: Infection - Adult  Goal: Absence of infection at discharge  Outcome: Progressing  Flowsheets (Taken 1/16/2025 2054)  Absence of infection at discharge:   Assess and monitor for signs and symptoms of infection   Monitor lab/diagnostic results   Monitor all insertion sites i.e., indwelling lines, tubes and drains  Goal: Absence of infection during hospitalization  Outcome: Progressing  Flowsheets (Taken 1/16/2025 2054)  Absence of infection during hospitalization:   Monitor all insertion sites i.e., indwelling lines, tubes and drains   Monitor lab/diagnostic results   Assess and monitor for signs and symptoms of infection     Problem: Metabolic/Fluid and Electrolytes - Adult  Goal: Electrolytes maintained within normal limits  Outcome: Progressing  Flowsheets (Taken 1/16/2025 2054)  Electrolytes maintained within normal limits:   Monitor labs and assess patient for signs and symptoms of electrolyte imbalances   Administer electrolyte replacement as ordered   Monitor response to electrolyte

## 2025-01-18 VITALS
TEMPERATURE: 97.9 F | WEIGHT: 99 LBS | DIASTOLIC BLOOD PRESSURE: 70 MMHG | RESPIRATION RATE: 16 BRPM | HEART RATE: 76 BPM | BODY MASS INDEX: 16.9 KG/M2 | HEIGHT: 64 IN | OXYGEN SATURATION: 96 % | SYSTOLIC BLOOD PRESSURE: 118 MMHG

## 2025-01-18 LAB
ANION GAP SERPL CALCULATED.3IONS-SCNC: 9 MMOL/L (ref 3–16)
BASOPHILS # BLD: 0 K/UL (ref 0–0.2)
BASOPHILS NFR BLD: 0 %
BUN SERPL-MCNC: 30 MG/DL (ref 7–20)
CALCIUM SERPL-MCNC: 9.1 MG/DL (ref 8.3–10.6)
CHLORIDE SERPL-SCNC: 108 MMOL/L (ref 99–110)
CO2 SERPL-SCNC: 18 MMOL/L (ref 21–32)
CREAT SERPL-MCNC: 1.3 MG/DL (ref 0.6–1.2)
DEPRECATED RDW RBC AUTO: 14.9 % (ref 12.4–15.4)
EOSINOPHIL # BLD: 0 K/UL (ref 0–0.6)
EOSINOPHIL NFR BLD: 0 %
GFR SERPLBLD CREATININE-BSD FMLA CKD-EPI: 41 ML/MIN/{1.73_M2}
GLUCOSE BLD-MCNC: 135 MG/DL (ref 70–99)
GLUCOSE BLD-MCNC: 178 MG/DL (ref 70–99)
GLUCOSE SERPL-MCNC: 164 MG/DL (ref 70–99)
HCT VFR BLD AUTO: 33.6 % (ref 36–48)
HGB BLD-MCNC: 11.2 G/DL (ref 12–16)
LYMPHOCYTES # BLD: 0.6 K/UL (ref 1–5.1)
LYMPHOCYTES NFR BLD: 5.3 %
MCH RBC QN AUTO: 30.6 PG (ref 26–34)
MCHC RBC AUTO-ENTMCNC: 33.2 G/DL (ref 31–36)
MCV RBC AUTO: 92.2 FL (ref 80–100)
MONOCYTES # BLD: 0.5 K/UL (ref 0–1.3)
MONOCYTES NFR BLD: 4.8 %
NEUTROPHILS # BLD: 9.9 K/UL (ref 1.7–7.7)
NEUTROPHILS NFR BLD: 89.9 %
PERFORMED ON: ABNORMAL
PERFORMED ON: ABNORMAL
PLATELET # BLD AUTO: 126 K/UL (ref 135–450)
PMV BLD AUTO: 8.2 FL (ref 5–10.5)
POTASSIUM SERPL-SCNC: 4.3 MMOL/L (ref 3.5–5.1)
RBC # BLD AUTO: 3.64 M/UL (ref 4–5.2)
SODIUM SERPL-SCNC: 135 MMOL/L (ref 136–145)
WBC # BLD AUTO: 11 K/UL (ref 4–11)

## 2025-01-18 PROCEDURE — 2500000003 HC RX 250 WO HCPCS: Performed by: STUDENT IN AN ORGANIZED HEALTH CARE EDUCATION/TRAINING PROGRAM

## 2025-01-18 PROCEDURE — 94640 AIRWAY INHALATION TREATMENT: CPT

## 2025-01-18 PROCEDURE — 80048 BASIC METABOLIC PNL TOTAL CA: CPT

## 2025-01-18 PROCEDURE — 6370000000 HC RX 637 (ALT 250 FOR IP): Performed by: STUDENT IN AN ORGANIZED HEALTH CARE EDUCATION/TRAINING PROGRAM

## 2025-01-18 PROCEDURE — 36415 COLL VENOUS BLD VENIPUNCTURE: CPT

## 2025-01-18 PROCEDURE — 6360000002 HC RX W HCPCS: Performed by: STUDENT IN AN ORGANIZED HEALTH CARE EDUCATION/TRAINING PROGRAM

## 2025-01-18 PROCEDURE — 94761 N-INVAS EAR/PLS OXIMETRY MLT: CPT

## 2025-01-18 PROCEDURE — 2700000000 HC OXYGEN THERAPY PER DAY

## 2025-01-18 PROCEDURE — 85025 COMPLETE CBC W/AUTO DIFF WBC: CPT

## 2025-01-18 RX ORDER — PREDNISONE 20 MG/1
40 TABLET ORAL DAILY
Qty: 4 TABLET | Refills: 0 | Status: SHIPPED | OUTPATIENT
Start: 2025-01-18 | End: 2025-01-20

## 2025-01-18 RX ORDER — LEVOTHYROXINE SODIUM 50 UG/1
50 TABLET ORAL DAILY
Qty: 30 TABLET | Refills: 0 | Status: SHIPPED | OUTPATIENT
Start: 2025-01-18

## 2025-01-18 RX ADMIN — ONDANSETRON 4 MG: 2 INJECTION INTRAMUSCULAR; INTRAVENOUS at 09:26

## 2025-01-18 RX ADMIN — LEVOTHYROXINE SODIUM 50 MCG: 0.05 TABLET ORAL at 09:23

## 2025-01-18 RX ADMIN — ACETAMINOPHEN 650 MG: 325 TABLET ORAL at 06:10

## 2025-01-18 RX ADMIN — HEPARIN SODIUM 5000 UNITS: 5000 INJECTION INTRAVENOUS; SUBCUTANEOUS at 09:23

## 2025-01-18 RX ADMIN — TIOTROPIUM BROMIDE INHALATION SPRAY 2 PUFF: 3.12 SPRAY, METERED RESPIRATORY (INHALATION) at 09:17

## 2025-01-18 RX ADMIN — WATER 1000 MG: 1 INJECTION INTRAMUSCULAR; INTRAVENOUS; SUBCUTANEOUS at 09:23

## 2025-01-18 RX ADMIN — Medication 2 PUFF: at 09:17

## 2025-01-18 RX ADMIN — ONDANSETRON 4 MG: 2 INJECTION INTRAMUSCULAR; INTRAVENOUS at 02:01

## 2025-01-18 RX ADMIN — URSODIOL 600 MG: 300 CAPSULE ORAL at 09:26

## 2025-01-18 RX ADMIN — PREDNISOLONE ACETATE 2 DROP: 10 SUSPENSION/ DROPS OPHTHALMIC at 09:24

## 2025-01-18 RX ADMIN — PREDNISONE 40 MG: 20 TABLET ORAL at 09:23

## 2025-01-18 ASSESSMENT — PAIN SCALES - GENERAL
PAINLEVEL_OUTOF10: 0
PAINLEVEL_OUTOF10: 3

## 2025-01-18 NOTE — PROGRESS NOTES
Physician Progress Note      PATIENT:               ALEKSEY NEWBERRY  CSN #:                  [id number]  :                       1945  ADMIT DATE:       1/15/2025 4:56 PM  DISCH DATE:  RESPONDING  PROVIDER #:        Sari Allison DO          QUERY TEXT:    Patient admitted with PNA. Noted to have severe malnutrition per RD note .   If possible, please document in progress notes and discharge summary if you   are evaluating and /or treating any of the following:    The medical record reflects the following:  Risk Factors: PNA, vertebral fracture, DM2, COPD    Clinical Indicators: Per RD note -\" Severe malnutrition (25 1415)  Context:  Chronic Illness  Findings of the 6 clinical characteristics of malnutrition:  Energy Intake:  Mild decrease in energy intake  Weight Loss:  No weight loss (Chronic low BMI)  Body Fat Loss:  Severe body fat loss Orbital, Buccal region  Muscle Mass Loss:  Severe muscle mass loss Temples (temporalis), Clavicles   (pectoralis & deltoids), Hand (interosseous)  Fluid Accumulation:  No fluid accumulation   Strength:  Not Performed\"  bmi 16.99    Treatment: RD consult, I//O, weight, ONS    ASPEN Criteria:    https://aspenjournals.onlinelibrary.garcia.com/doi/full/10.1177/371850304960370  5  Options provided:  -- Protein calorie malnutrition severe  -- Other - I will add my own diagnosis  -- Disagree - Not applicable / Not valid  -- Disagree - Clinically unable to determine / Unknown  -- Refer to Clinical Documentation Reviewer    PROVIDER RESPONSE TEXT:    This patient has severe protein calorie malnutrition.    Query created by: Dorina Sommers on 2025 11:42 AM      Electronically signed by:  Sari Allison DO 2025 8:40 PM

## 2025-01-18 NOTE — CARE COORDINATION
CASE MANAGEMENT DISCHARGE SUMMARY      Discharge to: Home with ref to [facility]    IMM given: (date) 1/17      Transportation:    Family/car: yes    Confirmed discharge plan with:     Patient: yes per RN     Family:  no per pt     RN, name: Kyle TERRY    Note: Discharging nurse to complete KEON, reconcile AVS, and place final copy with patient's discharge packet. RN to ensure that written prescriptions for  Level II medications are sent with patient to the facility as per protocol.

## 2025-01-18 NOTE — PLAN OF CARE
Problem: Discharge Planning  Goal: Discharge to home or other facility with appropriate resources  Outcome: Progressing     Problem: Pain  Goal: Verbalizes/displays adequate comfort level or baseline comfort level  Outcome: Progressing     Problem: Safety - Adult  Goal: Free from fall injury  Outcome: Progressing     Problem: Respiratory - Adult  Goal: Achieves optimal ventilation and oxygenation  Outcome: Progressing     Problem: Cardiovascular - Adult  Goal: Maintains optimal cardiac output and hemodynamic stability  Outcome: Progressing  Goal: Absence of cardiac dysrhythmias or at baseline  Outcome: Progressing     Problem: Skin/Tissue Integrity - Adult  Goal: Skin integrity remains intact  Outcome: Progressing  Flowsheets (Taken 1/18/2025 0016)  Skin Integrity Remains Intact:   Monitor for areas of redness and/or skin breakdown   Assess vascular access sites hourly   Every 4-6 hours minimum: Change oxygen saturation probe site   Every 4-6 hours: If on nasal continuous positive airway pressure, respiratory therapy assesses nares and determine need for appliance change or resting period  Goal: Oral mucous membranes remain intact  Outcome: Progressing     Problem: Musculoskeletal - Adult  Goal: Return mobility to safest level of function  Outcome: Progressing  Goal: Maintain proper alignment of affected body part  Outcome: Progressing  Goal: Return ADL status to a safe level of function  Outcome: Progressing     Problem: Infection - Adult  Goal: Absence of infection at discharge  Outcome: Progressing  Goal: Absence of infection during hospitalization  Outcome: Progressing     Problem: Metabolic/Fluid and Electrolytes - Adult  Goal: Electrolytes maintained within normal limits  Outcome: Progressing  Goal: Hemodynamic stability and optimal renal function maintained  Outcome: Progressing  Goal: Glucose maintained within prescribed range  Outcome: Progressing     Problem: Hematologic - Adult  Goal: Maintains

## 2025-01-18 NOTE — DISCHARGE INSTR - COC
Continuity of Care Form    Patient Name: Treasure Fatima   :  1945  MRN:  [id number]    Admit date:  1/15/2025  Discharge date:  2024    Code Status Order: Full Code   Advance Directives:   Advance Care Flowsheet Documentation             Admitting Physician:  Jj Pierre DO  PCP: Keisha Tsang MD    Discharging Nurse: Wilson  Discharging Hospital Unit/Room#: 0116/0116-01  Discharging Unit Phone Number: [phone]    Emergency Contact:   Extended Emergency Contact Information  Primary Emergency Contact: ABHIJEET LACY  Home Phone: [phone]  Mobile Phone: [phone]  Relation: Daughter-in-Law  Secondary Emergency Contact: ERNESTINA REYNA  Mobile Phone: [phone]  Relation: None    Past Surgical History:  Past Surgical History:   Procedure Laterality Date    COLONOSCOPY N/A 3/12/2021    COLONOSCOPY DIAGNOSTIC performed by Niko Farley MD at [facility] ENDOSCOPY    FOOT DEBRIDEMENT Left 2024    LEFT FOOT DEBRIDEMENT INCISION AND DRAINAGE WITH PARTIAL FIFTH RAY AMPUTATION. performed by Lelo Montilla MD at [facility] OR    HYSTERECTOMY (CERVIX STATUS UNKNOWN)      UPPER GASTROINTESTINAL ENDOSCOPY N/A 3/12/2021    EGD BAND LIGATION performed by Niko Farley MD at [facility] ENDOSCOPY    UPPER GASTROINTESTINAL ENDOSCOPY N/A 2021    EGD BAND LIGATION performed by Violet Kelly MD at [facility] ENDOSCOPY    UPPER GASTROINTESTINAL ENDOSCOPY N/A 2021    EGD performed by Violet Kelly MD at [facility] ENDOSCOPY    UPPER GASTROINTESTINAL ENDOSCOPY  2021    EGD BAND LIGATION performed by Violet Kelly MD at [facility] ENDOSCOPY    UPPER GASTROINTESTINAL ENDOSCOPY N/A 2022    EGD DIAGNOSTIC ONLY performed by Violet Kelly MD at [facility] ENDOSCOPY    UPPER GASTROINTESTINAL ENDOSCOPY  2022    EGD FOREIGN BODY REMOVAL performed by Violet Kelly MD at [facility] ENDOSCOPY    UPPER GASTROINTESTINAL ENDOSCOPY N/A 2023    EGD performed by Gian Gonsales MD at [facility] ENDOSCOPY     requires Home Care for greater 30 days.     Update Admission H&P: No change in H&P    PHYSICIAN SIGNATURE:  Electronically signed by Catalnia Vazquez MD on 1/18/25 at 7:58 AM EST

## 2025-01-18 NOTE — DISCHARGE SUMMARY
performed of the thoracic spine. Axial and multiplanar reformatted images reviewed.   Individualized dose optimization technique was used in order to meet ALARA standards for radiation dose reduction.  In addition to vendor specific dose reduction algorithms, the dose reduction techniques vary based on the specific scanner utilized but frequently include automated exposure control, adjustment of the mA and/or kV according to patient size, and use of iterative reconstruction technique. Contrast: None FINDINGS: No evidence of acute fracture or traumatic abnormality is seen of the thoracic vertebra. No significant bony spondylotic change is seen. No significant discogenic disease is appreciated but if this were a concern, MRI correlation would be recommended. Patchy airspace disease is noted predominantly in the left lung base with bronchiectasis and fluid seen in the lower lobe bronchi.     No significant osseous abnormality seen of the thoracic spine. Airspace disease seen predominantly in left lower lobe as above. Pneumonia is suspected. Electronically signed by Lacho Hua MD      Consults:     IP CONSULT TO HOSPITALIST  IP CONSULT TO SPIRITUAL SERVICES  IP CONSULT TO HOME CARE NEEDS    Labs:     Recent Labs     01/16/25  0855 01/17/25  0611 01/18/25  0714   WBC 4.3 6.9 11.0   HGB 10.1* 9.7* 11.2*   HCT 29.8* 28.3* 33.6*   PLT 90* 98* 126*     Recent Labs     01/16/25  0558 01/17/25  0612 01/18/25  0714   * 134* 135*   K 4.7 4.3 4.3    107 108   CO2 21 19* 18*   BUN 21* 28* 30*   CREATININE 1.2 1.4* 1.3*   CALCIUM 8.6 8.7 9.1     Recent Labs     01/15/25  1717   TROPHS 22*     Recent Labs     01/16/25  0855   LABA1C 6.7     Recent Labs     01/15/25  1535 01/16/25  0558   AST 31 21   ALT 13 10   BILIDIR  --  0.4*   BILITOT 1.1* 0.5   ALKPHOS 171* 141*     Recent Labs     01/15/25  1717   LACTA 1.4       Urine Cultures: No results found for: \"LABURIN\"  Blood Cultures:   Lab Results   Component Value  Date/Time    BC No Growth after 4 days of incubation. 09/17/2024 05:58 PM     Lab Results   Component Value Date/Time    BLOODCULT2 No Growth after 4 days of incubation. 09/17/2024 05:49 PM     Organism:   Lab Results   Component Value Date/Time    ORG Staphylococcus aureus 09/18/2024 04:48 PM       Signed:    Catalina Vazquez MD

## 2025-02-25 ENCOUNTER — TRANSCRIBE ORDERS (OUTPATIENT)
Dept: ADMINISTRATIVE | Age: 80
End: 2025-02-25

## 2025-02-25 DIAGNOSIS — K74.3 PRIMARY BILIARY CHOLANGITIS (HCC): Primary | ICD-10-CM

## 2025-02-27 ENCOUNTER — HOSPITAL ENCOUNTER (OUTPATIENT)
Dept: ULTRASOUND IMAGING | Age: 80
Discharge: HOME | End: 2025-02-27
Attending: INTERNAL MEDICINE
Payer: MEDICARE

## 2025-02-27 DIAGNOSIS — K74.3 PRIMARY BILIARY CHOLANGITIS (HCC): ICD-10-CM

## 2025-02-27 PROCEDURE — 76705 ECHO EXAM OF ABDOMEN: CPT

## 2025-03-04 DIAGNOSIS — R18.8 CIRRHOSIS OF LIVER WITH ASCITES, UNSPECIFIED HEPATIC CIRRHOSIS TYPE (HCC): Primary | ICD-10-CM

## 2025-03-04 DIAGNOSIS — K74.60 CIRRHOSIS OF LIVER WITH ASCITES, UNSPECIFIED HEPATIC CIRRHOSIS TYPE (HCC): Primary | ICD-10-CM

## 2025-03-04 RX ORDER — FUROSEMIDE 20 MG/1
20 TABLET ORAL DAILY
Qty: 60 TABLET | Refills: 3 | Status: SHIPPED | OUTPATIENT
Start: 2025-03-04

## 2025-03-04 RX ORDER — POLYETHYLENE GLYCOL 3350 17 G/17G
17 POWDER, FOR SOLUTION ORAL DAILY
Qty: 1530 G | Refills: 1 | Status: SHIPPED | OUTPATIENT
Start: 2025-03-04 | End: 2025-08-31

## 2025-03-04 RX ORDER — SPIRONOLACTONE 25 MG/1
25 TABLET ORAL DAILY
Qty: 90 TABLET | Refills: 1 | Status: SHIPPED | OUTPATIENT
Start: 2025-03-04

## 2025-03-31 ENCOUNTER — APPOINTMENT (OUTPATIENT)
Dept: CT IMAGING | Age: 80
DRG: 391 | End: 2025-03-31
Payer: MEDICARE

## 2025-03-31 ENCOUNTER — HOSPITAL ENCOUNTER (INPATIENT)
Age: 80
LOS: 2 days | Discharge: HOME | DRG: 391 | End: 2025-04-03
Attending: STUDENT IN AN ORGANIZED HEALTH CARE EDUCATION/TRAINING PROGRAM | Admitting: FAMILY MEDICINE
Payer: MEDICARE

## 2025-03-31 DIAGNOSIS — K22.0 ACHALASIA: ICD-10-CM

## 2025-03-31 DIAGNOSIS — R13.10 DYSPHAGIA, UNSPECIFIED TYPE: Primary | ICD-10-CM

## 2025-03-31 DIAGNOSIS — E46 MALNUTRITION, UNSPECIFIED TYPE: ICD-10-CM

## 2025-03-31 LAB
ALBUMIN SERPL-MCNC: 3.3 G/DL (ref 3.4–5)
ALBUMIN/GLOB SERPL: 0.8 {RATIO} (ref 1.1–2.2)
ALP SERPL-CCNC: 145 U/L (ref 40–129)
ALT SERPL-CCNC: 19 U/L (ref 10–40)
ANION GAP SERPL CALCULATED.3IONS-SCNC: 14 MMOL/L (ref 3–16)
AST SERPL-CCNC: 36 U/L (ref 15–37)
BASOPHILS # BLD: 0 K/UL (ref 0–0.2)
BASOPHILS NFR BLD: 0.3 %
BILIRUB SERPL-MCNC: 0.8 MG/DL (ref 0–1)
BUN SERPL-MCNC: 37 MG/DL (ref 7–20)
CALCIUM SERPL-MCNC: 10 MG/DL (ref 8.3–10.6)
CHLORIDE SERPL-SCNC: 103 MMOL/L (ref 99–110)
CO2 SERPL-SCNC: 22 MMOL/L (ref 21–32)
CREAT SERPL-MCNC: 1.2 MG/DL (ref 0.6–1.2)
DEPRECATED RDW RBC AUTO: 15.8 % (ref 12.4–15.4)
EOSINOPHIL # BLD: 0.1 K/UL (ref 0–0.6)
EOSINOPHIL NFR BLD: 1.5 %
GFR SERPLBLD CREATININE-BSD FMLA CKD-EPI: 46 ML/MIN/{1.73_M2}
GLUCOSE SERPL-MCNC: 131 MG/DL (ref 70–99)
HCT VFR BLD AUTO: 34.5 % (ref 36–48)
HGB BLD-MCNC: 11.4 G/DL (ref 12–16)
LYMPHOCYTES # BLD: 0.5 K/UL (ref 1–5.1)
LYMPHOCYTES NFR BLD: 6.8 %
MCH RBC QN AUTO: 29.6 PG (ref 26–34)
MCHC RBC AUTO-ENTMCNC: 33.1 G/DL (ref 31–36)
MCV RBC AUTO: 89.4 FL (ref 80–100)
MONOCYTES # BLD: 0.3 K/UL (ref 0–1.3)
MONOCYTES NFR BLD: 5 %
NEUTROPHILS # BLD: 5.8 K/UL (ref 1.7–7.7)
NEUTROPHILS NFR BLD: 86.4 %
PLATELET # BLD AUTO: 100 K/UL (ref 135–450)
PMV BLD AUTO: 8.3 FL (ref 5–10.5)
POTASSIUM SERPL-SCNC: 5.1 MMOL/L (ref 3.5–5.1)
PROT SERPL-MCNC: 7.6 G/DL (ref 6.4–8.2)
RBC # BLD AUTO: 3.86 M/UL (ref 4–5.2)
SODIUM SERPL-SCNC: 139 MMOL/L (ref 136–145)
WBC # BLD AUTO: 6.7 K/UL (ref 4–11)

## 2025-03-31 PROCEDURE — 99285 EMERGENCY DEPT VISIT HI MDM: CPT

## 2025-03-31 PROCEDURE — 80053 COMPREHEN METABOLIC PANEL: CPT

## 2025-03-31 PROCEDURE — 2580000003 HC RX 258: Performed by: EMERGENCY MEDICINE

## 2025-03-31 PROCEDURE — 36415 COLL VENOUS BLD VENIPUNCTURE: CPT

## 2025-03-31 PROCEDURE — 71260 CT THORAX DX C+: CPT

## 2025-03-31 PROCEDURE — 85025 COMPLETE CBC W/AUTO DIFF WBC: CPT

## 2025-03-31 PROCEDURE — 96360 HYDRATION IV INFUSION INIT: CPT

## 2025-03-31 PROCEDURE — 6360000004 HC RX CONTRAST MEDICATION: Performed by: STUDENT IN AN ORGANIZED HEALTH CARE EDUCATION/TRAINING PROGRAM

## 2025-03-31 PROCEDURE — 6370000000 HC RX 637 (ALT 250 FOR IP)

## 2025-03-31 RX ORDER — SODIUM CHLORIDE, SODIUM LACTATE, POTASSIUM CHLORIDE, CALCIUM CHLORIDE 600; 310; 30; 20 MG/100ML; MG/100ML; MG/100ML; MG/100ML
INJECTION, SOLUTION INTRAVENOUS CONTINUOUS
Status: DISCONTINUED | OUTPATIENT
Start: 2025-03-31 | End: 2025-04-03 | Stop reason: HOSPADM

## 2025-03-31 RX ORDER — SODIUM CHLORIDE, SODIUM LACTATE, POTASSIUM CHLORIDE, AND CALCIUM CHLORIDE .6; .31; .03; .02 G/100ML; G/100ML; G/100ML; G/100ML
500 INJECTION, SOLUTION INTRAVENOUS ONCE
Status: COMPLETED | OUTPATIENT
Start: 2025-03-31 | End: 2025-04-01

## 2025-03-31 RX ORDER — IOPAMIDOL 755 MG/ML
75 INJECTION, SOLUTION INTRAVASCULAR
Status: COMPLETED | OUTPATIENT
Start: 2025-03-31 | End: 2025-03-31

## 2025-03-31 RX ADMIN — SODIUM CHLORIDE, SODIUM LACTATE, POTASSIUM CHLORIDE, AND CALCIUM CHLORIDE 500 ML: .6; .31; .03; .02 INJECTION, SOLUTION INTRAVENOUS at 23:51

## 2025-03-31 RX ADMIN — IOPAMIDOL 75 ML: 755 INJECTION, SOLUTION INTRAVENOUS at 22:15

## 2025-03-31 RX ADMIN — LIDOCAINE HYDROCHLORIDE: 20 SOLUTION ORAL at 20:03

## 2025-03-31 ASSESSMENT — LIFESTYLE VARIABLES
HOW OFTEN DO YOU HAVE A DRINK CONTAINING ALCOHOL: NEVER
HOW MANY STANDARD DRINKS CONTAINING ALCOHOL DO YOU HAVE ON A TYPICAL DAY: PATIENT DOES NOT DRINK

## 2025-03-31 ASSESSMENT — PAIN - FUNCTIONAL ASSESSMENT: PAIN_FUNCTIONAL_ASSESSMENT: NONE - DENIES PAIN

## 2025-03-31 NOTE — ED PROVIDER NOTES
I independently performed a history and physical on Treasure Fatima.     I have discussed the case with the RHYS/resident at 1950 and approve / take responsibility for the initial management plan and anticipated disposition as documented below.     In summary the patient presents with dysphagia or globus sensation on a background of a history of primary biliary cirrhosis with a history of esophageal varices and hepatic gastropathy.  On my evaluation the patient is afebrile and hemodynamically stable in no acute distress.  She is quite medically frail though nontoxic clinically euvolemic despite reporting poor enteral intake for at least several days.  On exam her breath sounds are clear her abdomen is soft nontender nondistended extremities are warm and well-perfused.  On evaluation it would seem that she has successfully passed at least fluids if not solids by mouth but again has some pain in the epigastrium.  She is feeling better here after a GI cocktail.  Her laboratory evaluation has been reassuring and her disposition with pending results of CT imaging as she has successfully passed a p.o. challenge    I interpreted the following studies:  na    I personally discussed the patient's management with the following:  na      For further details of Treasure Fatima's emergency department encounter, please see the RHYS/resident's documentation. Please note the signature time recorded here indicates the limit of my supervision of this case and should the patient require further management prior to disposition I have signed the case out to my colleague Frankie Sen MD  03/31/25 8241

## 2025-03-31 NOTE — ED PROVIDER NOTES
[facility] EMERGENCY DEPARTMENT  EMERGENCY DEPARTMENT ENCOUNTER        Pt Name: Treasure Fatima  MRN: [id number]  Birthdate 1945  Date of evaluation: 3/31/2025  Provider: Frankie Oliva MD  PCP: Keisha Tsang MD      CHIEF COMPLAINT       Chief Complaint   Patient presents with    Dysphagia     The patient says she's been unable to swallow anything for several days. She says liquid and solids won't go down. \"Like there's a blockage\". She doesn't feel that any food is stuck but states nothing will pass. Says something happened before when she was being treated for cirrhosis.        HISTORY OFPRESENT ILLNESS   (Location/Symptom, Timing/Onset, Context/Setting, Quality, Duration, Modifying Factors,Severity)  Note limiting factors.     Treasure Fatima is a 79 y.o. female presenting today due to concern for difficulty swallowing.  Patient reports that for the last few days, she has been unable to tolerate food or drink by mouth.  Patient states that it feels like there is something stuck in her throat and that it hurts to try and swallow.  Patient denies any symptoms of chest pain, shortness of breath, headache.  Patient does report sensation of pain and nausea when she does attempt to swallow.  Patient states that her children became concerned that the patient may be dehydrated.  Patient reports that she has a history of cirrhosis and acid reflux.  Patient has had an EGD done as recently as November 2024 which was notable for grade 1 esophageal varices and portal hypertensive gastropathy.            Review of Systems:     Positives and Pertinent negatives as per HPI.      PASTMEDICAL HISTORY     Past Medical History:   Diagnosis Date    Acid reflux     Arthritis     COPD (chronic obstructive pulmonary disease) (HCC)     Pneumonia     Primary biliary cirrhosis (HCC)     Thyroid disease     hypothyroidism         SURGICAL HISTORY       Past Surgical History:   Procedure Laterality Date    COLONOSCOPY

## 2025-04-01 ENCOUNTER — ANESTHESIA EVENT (OUTPATIENT)
Dept: ENDOSCOPY | Age: 80
DRG: 391 | End: 2025-04-01
Payer: MEDICARE

## 2025-04-01 ENCOUNTER — ANESTHESIA (OUTPATIENT)
Dept: ENDOSCOPY | Age: 80
DRG: 391 | End: 2025-04-01
Payer: MEDICARE

## 2025-04-01 PROBLEM — K22.0 ACHALASIA OF ESOPHAGUS: Status: ACTIVE | Noted: 2025-04-01

## 2025-04-01 LAB
ALBUMIN SERPL-MCNC: 3.1 G/DL (ref 3.4–5)
ALP SERPL-CCNC: 133 U/L (ref 40–129)
ALT SERPL-CCNC: 16 U/L (ref 10–40)
ANION GAP SERPL CALCULATED.3IONS-SCNC: 13 MMOL/L (ref 3–16)
AST SERPL-CCNC: 34 U/L (ref 15–37)
BASOPHILS # BLD: 0 K/UL (ref 0–0.2)
BASOPHILS NFR BLD: 0.6 %
BILIRUB DIRECT SERPL-MCNC: 0.4 MG/DL (ref 0–0.3)
BILIRUB INDIRECT SERPL-MCNC: 0.2 MG/DL (ref 0–1)
BILIRUB SERPL-MCNC: 0.6 MG/DL (ref 0–1)
BUN SERPL-MCNC: 34 MG/DL (ref 7–20)
CALCIUM SERPL-MCNC: 9.7 MG/DL (ref 8.3–10.6)
CHLORIDE SERPL-SCNC: 103 MMOL/L (ref 99–110)
CO2 SERPL-SCNC: 17 MMOL/L (ref 21–32)
CREAT SERPL-MCNC: 1 MG/DL (ref 0.6–1.2)
DEPRECATED RDW RBC AUTO: 15.7 % (ref 12.4–15.4)
EOSINOPHIL # BLD: 0.1 K/UL (ref 0–0.6)
EOSINOPHIL NFR BLD: 2.8 %
GFR SERPLBLD CREATININE-BSD FMLA CKD-EPI: 57 ML/MIN/{1.73_M2}
GLUCOSE SERPL-MCNC: 113 MG/DL (ref 70–99)
HCT VFR BLD AUTO: 31.6 % (ref 36–48)
HGB BLD-MCNC: 10.6 G/DL (ref 12–16)
INR PPP: 1.16 (ref 0.85–1.15)
LYMPHOCYTES # BLD: 0.5 K/UL (ref 1–5.1)
LYMPHOCYTES NFR BLD: 9.7 %
MCH RBC QN AUTO: 29.9 PG (ref 26–34)
MCHC RBC AUTO-ENTMCNC: 33.5 G/DL (ref 31–36)
MCV RBC AUTO: 89.3 FL (ref 80–100)
MONOCYTES # BLD: 0.3 K/UL (ref 0–1.3)
MONOCYTES NFR BLD: 6.3 %
NEUTROPHILS # BLD: 4.1 K/UL (ref 1.7–7.7)
NEUTROPHILS NFR BLD: 80.6 %
PLATELET # BLD AUTO: 82 K/UL (ref 135–450)
PLATELET BLD QL SMEAR: ABNORMAL
PMV BLD AUTO: 8.1 FL (ref 5–10.5)
POTASSIUM SERPL-SCNC: 4.3 MMOL/L (ref 3.5–5.1)
PROT SERPL-MCNC: 7 G/DL (ref 6.4–8.2)
PROTHROMBIN TIME: 15 SEC (ref 11.9–14.9)
RBC # BLD AUTO: 3.53 M/UL (ref 4–5.2)
RBC MORPH BLD: NORMAL
REASON FOR REJECTION: NORMAL
REJECTED TEST: NORMAL
SLIDE REVIEW: ABNORMAL
SODIUM SERPL-SCNC: 133 MMOL/L (ref 136–145)
WBC # BLD AUTO: 5.1 K/UL (ref 4–11)

## 2025-04-01 PROCEDURE — 80076 HEPATIC FUNCTION PANEL: CPT

## 2025-04-01 PROCEDURE — 94761 N-INVAS EAR/PLS OXIMETRY MLT: CPT

## 2025-04-01 PROCEDURE — 6370000000 HC RX 637 (ALT 250 FOR IP): Performed by: FAMILY MEDICINE

## 2025-04-01 PROCEDURE — 7100000011 HC PHASE II RECOVERY - ADDTL 15 MIN: Performed by: INTERNAL MEDICINE

## 2025-04-01 PROCEDURE — 6370000000 HC RX 637 (ALT 250 FOR IP): Performed by: INTERNAL MEDICINE

## 2025-04-01 PROCEDURE — 88312 SPECIAL STAINS GROUP 1: CPT

## 2025-04-01 PROCEDURE — C1726 CATH, BAL DIL, NON-VASCULAR: HCPCS | Performed by: INTERNAL MEDICINE

## 2025-04-01 PROCEDURE — 3700000000 HC ANESTHESIA ATTENDED CARE: Performed by: INTERNAL MEDICINE

## 2025-04-01 PROCEDURE — 2580000003 HC RX 258: Performed by: FAMILY MEDICINE

## 2025-04-01 PROCEDURE — 2700000000 HC OXYGEN THERAPY PER DAY

## 2025-04-01 PROCEDURE — 3700000001 HC ADD 15 MINUTES (ANESTHESIA): Performed by: INTERNAL MEDICINE

## 2025-04-01 PROCEDURE — 3609017700 HC EGD DILATION GASTRIC/DUODENAL STRICTURE: Performed by: INTERNAL MEDICINE

## 2025-04-01 PROCEDURE — 0DB38ZX EXCISION OF LOWER ESOPHAGUS, VIA NATURAL OR ARTIFICIAL OPENING ENDOSCOPIC, DIAGNOSTIC: ICD-10-PCS | Performed by: INTERNAL MEDICINE

## 2025-04-01 PROCEDURE — 0DB28ZX EXCISION OF MIDDLE ESOPHAGUS, VIA NATURAL OR ARTIFICIAL OPENING ENDOSCOPIC, DIAGNOSTIC: ICD-10-PCS | Performed by: INTERNAL MEDICINE

## 2025-04-01 PROCEDURE — 0D728ZZ DILATION OF MIDDLE ESOPHAGUS, VIA NATURAL OR ARTIFICIAL OPENING ENDOSCOPIC: ICD-10-PCS | Performed by: INTERNAL MEDICINE

## 2025-04-01 PROCEDURE — 88305 TISSUE EXAM BY PATHOLOGIST: CPT

## 2025-04-01 PROCEDURE — 6360000002 HC RX W HCPCS: Performed by: NURSE ANESTHETIST, CERTIFIED REGISTERED

## 2025-04-01 PROCEDURE — 6360000002 HC RX W HCPCS: Performed by: FAMILY MEDICINE

## 2025-04-01 PROCEDURE — 7100000010 HC PHASE II RECOVERY - FIRST 15 MIN: Performed by: INTERNAL MEDICINE

## 2025-04-01 PROCEDURE — 80048 BASIC METABOLIC PNL TOTAL CA: CPT

## 2025-04-01 PROCEDURE — 2580000003 HC RX 258: Performed by: EMERGENCY MEDICINE

## 2025-04-01 PROCEDURE — 36415 COLL VENOUS BLD VENIPUNCTURE: CPT

## 2025-04-01 PROCEDURE — 3609012400 HC EGD TRANSORAL BIOPSY SINGLE/MULTIPLE: Performed by: INTERNAL MEDICINE

## 2025-04-01 PROCEDURE — 85025 COMPLETE CBC W/AUTO DIFF WBC: CPT

## 2025-04-01 PROCEDURE — 1200000000 HC SEMI PRIVATE

## 2025-04-01 PROCEDURE — 2709999900 HC NON-CHARGEABLE SUPPLY: Performed by: INTERNAL MEDICINE

## 2025-04-01 PROCEDURE — 85610 PROTHROMBIN TIME: CPT

## 2025-04-01 RX ORDER — SODIUM CHLORIDE 9 MG/ML
INJECTION, SOLUTION INTRAVENOUS CONTINUOUS
Status: ACTIVE | OUTPATIENT
Start: 2025-04-01 | End: 2025-04-02

## 2025-04-01 RX ORDER — HEPARIN SODIUM 5000 [USP'U]/ML
5000 INJECTION, SOLUTION INTRAVENOUS; SUBCUTANEOUS 2 TIMES DAILY
Status: DISCONTINUED | OUTPATIENT
Start: 2025-04-01 | End: 2025-04-03 | Stop reason: HOSPADM

## 2025-04-01 RX ORDER — URSODIOL 500 MG/1
750 TABLET, FILM COATED ORAL DAILY
Status: DISCONTINUED | OUTPATIENT
Start: 2025-04-01 | End: 2025-04-03 | Stop reason: HOSPADM

## 2025-04-01 RX ORDER — ONDANSETRON 2 MG/ML
4 INJECTION INTRAMUSCULAR; INTRAVENOUS EVERY 6 HOURS PRN
Status: DISCONTINUED | OUTPATIENT
Start: 2025-04-01 | End: 2025-04-03 | Stop reason: HOSPADM

## 2025-04-01 RX ORDER — LIDOCAINE HYDROCHLORIDE 20 MG/ML
INJECTION, SOLUTION EPIDURAL; INFILTRATION; INTRACAUDAL; PERINEURAL
Status: DISCONTINUED | OUTPATIENT
Start: 2025-04-01 | End: 2025-04-01 | Stop reason: SDUPTHER

## 2025-04-01 RX ORDER — SODIUM CHLORIDE 0.9 % (FLUSH) 0.9 %
5-40 SYRINGE (ML) INJECTION PRN
Status: DISCONTINUED | OUTPATIENT
Start: 2025-04-01 | End: 2025-04-03 | Stop reason: HOSPADM

## 2025-04-01 RX ORDER — BUDESONIDE AND FORMOTEROL FUMARATE DIHYDRATE 80; 4.5 UG/1; UG/1
2 AEROSOL RESPIRATORY (INHALATION)
Status: DISCONTINUED | OUTPATIENT
Start: 2025-04-01 | End: 2025-04-03 | Stop reason: HOSPADM

## 2025-04-01 RX ORDER — LEVOTHYROXINE SODIUM 50 UG/1
50 TABLET ORAL DAILY
Status: DISCONTINUED | OUTPATIENT
Start: 2025-04-01 | End: 2025-04-03 | Stop reason: HOSPADM

## 2025-04-01 RX ORDER — LACTULOSE 10 G/15ML
10 SOLUTION ORAL 2 TIMES DAILY
Status: DISCONTINUED | OUTPATIENT
Start: 2025-04-01 | End: 2025-04-03 | Stop reason: HOSPADM

## 2025-04-01 RX ORDER — PANTOPRAZOLE SODIUM 40 MG/10ML
40 INJECTION, POWDER, LYOPHILIZED, FOR SOLUTION INTRAVENOUS DAILY
Status: DISCONTINUED | OUTPATIENT
Start: 2025-04-01 | End: 2025-04-03 | Stop reason: HOSPADM

## 2025-04-01 RX ORDER — ONDANSETRON 4 MG/1
4 TABLET, ORALLY DISINTEGRATING ORAL EVERY 8 HOURS PRN
Status: DISCONTINUED | OUTPATIENT
Start: 2025-04-01 | End: 2025-04-03 | Stop reason: HOSPADM

## 2025-04-01 RX ORDER — SODIUM CHLORIDE 9 MG/ML
INJECTION, SOLUTION INTRAVENOUS PRN
Status: DISCONTINUED | OUTPATIENT
Start: 2025-04-01 | End: 2025-04-03 | Stop reason: HOSPADM

## 2025-04-01 RX ORDER — SODIUM CHLORIDE 0.9 % (FLUSH) 0.9 %
5-40 SYRINGE (ML) INJECTION EVERY 12 HOURS SCHEDULED
Status: DISCONTINUED | OUTPATIENT
Start: 2025-04-01 | End: 2025-04-03 | Stop reason: HOSPADM

## 2025-04-01 RX ORDER — FUROSEMIDE 20 MG/1
20 TABLET ORAL DAILY
Status: DISCONTINUED | OUTPATIENT
Start: 2025-04-01 | End: 2025-04-03 | Stop reason: HOSPADM

## 2025-04-01 RX ORDER — PROPOFOL 10 MG/ML
INJECTION, EMULSION INTRAVENOUS
Status: DISCONTINUED | OUTPATIENT
Start: 2025-04-01 | End: 2025-04-01 | Stop reason: SDUPTHER

## 2025-04-01 RX ORDER — POLYETHYLENE GLYCOL 3350 17 G/17G
17 POWDER, FOR SOLUTION ORAL DAILY PRN
Status: DISCONTINUED | OUTPATIENT
Start: 2025-04-01 | End: 2025-04-03 | Stop reason: HOSPADM

## 2025-04-01 RX ORDER — POLYETHYLENE GLYCOL 3350 17 G/17G
17 POWDER, FOR SOLUTION ORAL DAILY
Status: DISCONTINUED | OUTPATIENT
Start: 2025-04-01 | End: 2025-04-03 | Stop reason: HOSPADM

## 2025-04-01 RX ORDER — ACETAMINOPHEN 650 MG/1
650 SUPPOSITORY RECTAL EVERY 6 HOURS PRN
Status: DISCONTINUED | OUTPATIENT
Start: 2025-04-01 | End: 2025-04-03 | Stop reason: HOSPADM

## 2025-04-01 RX ORDER — SPIRONOLACTONE 25 MG/1
25 TABLET ORAL DAILY
Status: DISCONTINUED | OUTPATIENT
Start: 2025-04-01 | End: 2025-04-03 | Stop reason: HOSPADM

## 2025-04-01 RX ORDER — ACETAMINOPHEN 325 MG/1
650 TABLET ORAL EVERY 6 HOURS PRN
Status: DISCONTINUED | OUTPATIENT
Start: 2025-04-01 | End: 2025-04-03 | Stop reason: HOSPADM

## 2025-04-01 RX ADMIN — SODIUM CHLORIDE: 0.9 INJECTION, SOLUTION INTRAVENOUS at 06:25

## 2025-04-01 RX ADMIN — SODIUM CHLORIDE: 0.9 INJECTION, SOLUTION INTRAVENOUS at 19:08

## 2025-04-01 RX ADMIN — LACTULOSE 10 G: 20 SOLUTION ORAL at 12:03

## 2025-04-01 RX ADMIN — PROPOFOL 20 MG: 10 INJECTION, EMULSION INTRAVENOUS at 10:48

## 2025-04-01 RX ADMIN — SODIUM CHLORIDE, SODIUM LACTATE, POTASSIUM CHLORIDE, AND CALCIUM CHLORIDE: .6; .31; .03; .02 INJECTION, SOLUTION INTRAVENOUS at 00:40

## 2025-04-01 RX ADMIN — PROPOFOL 20 MG: 10 INJECTION, EMULSION INTRAVENOUS at 10:40

## 2025-04-01 RX ADMIN — PANTOPRAZOLE SODIUM 40 MG: 40 INJECTION, POWDER, FOR SOLUTION INTRAVENOUS at 12:03

## 2025-04-01 RX ADMIN — PROPOFOL 20 MG: 10 INJECTION, EMULSION INTRAVENOUS at 10:44

## 2025-04-01 RX ADMIN — PROPOFOL 20 MG: 10 INJECTION, EMULSION INTRAVENOUS at 10:42

## 2025-04-01 RX ADMIN — LIDOCAINE HYDROCHLORIDE 50 MG: 20 INJECTION, SOLUTION EPIDURAL; INFILTRATION; INTRACAUDAL; PERINEURAL at 10:40

## 2025-04-01 RX ADMIN — URSODIOL 750 MG: 500 TABLET, FILM COATED ORAL at 14:13

## 2025-04-01 RX ADMIN — SPIRONOLACTONE 25 MG: 25 TABLET ORAL at 12:03

## 2025-04-01 RX ADMIN — HEPARIN SODIUM 5000 UNITS: 5000 INJECTION INTRAVENOUS; SUBCUTANEOUS at 20:50

## 2025-04-01 RX ADMIN — LACTULOSE 10 G: 20 SOLUTION ORAL at 20:50

## 2025-04-01 RX ADMIN — FUROSEMIDE 20 MG: 20 TABLET ORAL at 12:03

## 2025-04-01 ASSESSMENT — PAIN SCALES - GENERAL
PAINLEVEL_OUTOF10: 0

## 2025-04-01 ASSESSMENT — PAIN - FUNCTIONAL ASSESSMENT: PAIN_FUNCTIONAL_ASSESSMENT: 0-10

## 2025-04-01 NOTE — ED NOTES
Treasure Fatima is a 79 y.o. female admitted for  Active Problems:    * No active hospital problems. *  Resolved Problems:    * No resolved hospital problems. *  .   Patient Home via family with   Chief Complaint   Patient presents with    Dysphagia     The patient says she's been unable to swallow anything for several days. She says liquid and solids won't go down. \"Like there's a blockage\". She doesn't feel that any food is stuck but states nothing will pass. Says something happened before when she was being treated for cirrhosis.    .  Patient is alert and Person, Place, and Time  Patient's baseline mobility: Baseline Mobility: Independent   Code Status: Prior   Cardiac Rhythm:       Is patient on baseline Oxygen: no how many Liters RA:   Abnormal Assessment Findings: dysphagia, achalasia, malnutrition     Isolation: None      NIH Score:    C-SSRS: Risk of Suicide: No Risk  Bedside swallow:        Active LDA's:   Peripheral IV 03/31/25 Proximal;Right Forearm (Active)   Line Status Flushed 04/01/25 0029   Phlebitis Assessment No symptoms 04/01/25 0029   Infiltration Assessment 0 04/01/25 0029   Dressing Status Clean, dry & intact 04/01/25 0029       Peripheral IV 04/01/25 Right Forearm (Active)   Site Assessment Clean, dry & intact 04/01/25 0029   Line Status Flushed;Brisk blood return;Infusing 04/01/25 0029   Phlebitis Assessment No symptoms 04/01/25 0029   Infiltration Assessment 0 04/01/25 0029   Dressing Status New dressing applied;Clean, dry & intact 04/01/25 0029     Patient admitted with a daly: no If the daly is chronic was it exchanged:  Reason for daly:   Patient admitted with Central Line:  . PICC line placement confirmed: YES OR NO:[id number]}   Reason for Central line:   Was central line Inserted from an outside facility:        Family/Caregiver Present no Any Concerns: no   Restraints no  Sitter no         Vitals: MEWS Score: 2    Vitals:    03/31/25 2143 03/31/25 2256 03/31/25 2311 04/01/25 0025

## 2025-04-01 NOTE — PROGRESS NOTES
4 Eyes Skin Assessment     NAME:  Treasure Fatima  YOB: 1945  MEDICAL RECORD NUMBER:  [id number]    The patient is being assessed for  Other low donavon  and post EGD    I agree that at least one RN has performed a thorough Head to Toe Skin Assessment on the patient. ALL assessment sites listed below have been assessed.      Areas assessed by both nurses:    Head, Face, Ears, Shoulders, Back, Chest, Arms, Elbows, Hands, Sacrum. Buttock, Coccyx, Ischium, Legs. Feet and Heels, and Under Medical Devices         Does the Patient have a Wound? No noted wound(s)       Donavon Prevention initiated by RN: Yes  Wound Care Orders initiated by RN: No    Pressure Injury (Stage 3,4, Unstageable, DTI, NWPT, and Complex wounds) if present, place Wound referral order by RN under : No    New Ostomies, if present place, Ostomy referral order under : No     Nurse 1 eSignature: Electronically signed by Rebecca Choudhary RN on 4/1/25 at 3:14 PM EDT    **SHARE this note so that the co-signing nurse can place an eSignature**    Nurse 2 eSignature: Electronically signed by Fely Crouch RN on 4/1/25 at 6:20 PM EDT

## 2025-04-01 NOTE — PROGRESS NOTES
Patient's 22G Diffusics in left distal forearm extravasated @2200 extravasating 33 ml of Isovue 370 contrast.  Stopped injection, applied warm compass, elevated arm, called RN in charge of patient and they came over and accessed arm and started new IV.

## 2025-04-01 NOTE — ANESTHESIA POSTPROCEDURE EVALUATION
Department of Anesthesiology  Postprocedure Note    Patient: Treasure Fatima  MRN: [id number]  YOB: 1945  Date of evaluation: 4/1/2025    Procedure Summary       Date: 04/01/25 Room / Location: [facility] / [facility]    Anesthesia Start: 1037 Anesthesia Stop: 1057    Procedures:       ESOPHAGOGASTRODUODENOSCOPY DILATION BALLOON      ESOPHAGOGASTRODUODENOSCOPY BIOPSY Diagnosis:       Dysphagia, unspecified type      (Dysphagia, unspecified type [R13.10])    Surgeons: Gregory Romero MD Responsible Provider: Dario Fontenot MD    Anesthesia Type: MAC ASA Status: 3            Anesthesia Type: No value filed.    Edita Phase I: Edita Score: 10    Edita Phase II: Edita Score: 10    Anesthesia Post Evaluation    Patient location during evaluation: PACU  Patient participation: complete - patient participated  Level of consciousness: awake and alert  Pain score: 0  Airway patency: patent  Nausea & Vomiting: no nausea and no vomiting  Cardiovascular status: blood pressure returned to baseline  Respiratory status: acceptable  Hydration status: stable  Pain management: adequate    No notable events documented.

## 2025-04-01 NOTE — CONSULTS
Gastroenterology H&P/Consult Note    Patient: Treasure Fatima CSN: [id number]     YOB: 1945  Age: 79 y.o.  Sex: female    Unit: [place] [address] MED SURG/ORTHO Room/Bed: 0543/0543-01 Location: [facility]     Admitting Physician: ANDRÉS QUINTERO    Date of  Admission: 3/31/2025   Admission type: Emergency  Primary Care Physician: Keisha Tsang MD         Chief Complaint: dysphagia  Consult Date: 4/1/2025     Subjective:     History of Present Illness: Treasure Fatima is a 79 y.o. female who is seen at the request of ANDRÉS QUINTERO for achalasia.    79-year-old female with medical history of GERD, COPD,  hypothyroidism, rheumatoid arthritis, primary biliary cholangitis (diagnosed 11/2019) and PBC cirrhosis, esophageal varix s/p banding 2023 admitted for 3/31/25 for dysphagia of several days duration. Patient reports progressive solid food dysphagia over the past 2 days. Associated with nonbilious nonbloody vomiting last night. She also reports subjective 20lb unintentional weight loss over the past 1 months. Denies abdominal pain,fever, chills, constipation, diarrhea, hematochezia or melenic stools.       Labs on 3/31/2025 reviewed with normocytic anemia with hemoglobin 11.4, hematocrit 34.5, platelets 100, WBC 6.7.  Unremarkable liver test except low albumin 3.3, .  Normal total bilirubin 0.8, ALT 19, AST 36.  Unremarkable BMP with sodium 139, BUN 37, creatinine 1.2. MELD Na pending INR    CT abdomen and pelvis 3/31/25 noted dilated fluid filled esophagus possible loculation.  Upper lobe fibrosis and emphysema.  Left lower lobe severe bronchiectasis with fluid-filled distended bronchi probable aspiration in the left abdomen.  Splenomegaly and gastric varices.  Ascites.  Colonic diverticulosis.  No suspicious hepatic masses.  Compression deformity of L1.    Pertinent PMH, FH, SH is reviewed below.  Last EGD 11/12/2024 Dr. Kelly: Grade I esophageal varices. Portal

## 2025-04-01 NOTE — PLAN OF CARE
Problem: Discharge Planning  Goal: Discharge to home or other facility with appropriate resources  Outcome: Progressing  Flowsheets (Taken 4/1/2025 9217 by Radha Bhatti, RN)  Discharge to home or other facility with appropriate resources: Identify barriers to discharge with patient and caregiver     Problem: Skin/Tissue Integrity  Goal: Skin integrity remains intact  Description: 1.  Monitor for areas of redness and/or skin breakdown  2.  Assess vascular access sites hourly  3.  Every 4-6 hours minimum:  Change oxygen saturation probe site  4.  Every 4-6 hours:  If on nasal continuous positive airway pressure, respiratory therapy assess nares and determine need for appliance change or resting period  Outcome: Progressing     Problem: Safety - Adult  Goal: Free from fall injury  Outcome: Progressing     Problem: Pain  Goal: Verbalizes/displays adequate comfort level or baseline comfort level  Outcome: Progressing     Problem: Nutrition Deficit:  Goal: Optimize nutritional status  Outcome: Progressing

## 2025-04-01 NOTE — H&P
DRAINAGE WITH PARTIAL FIFTH RAY AMPUTATION. performed by Lelo Montilla MD at [facility] OR    HYSTERECTOMY (CERVIX STATUS UNKNOWN)      UPPER GASTROINTESTINAL ENDOSCOPY N/A 3/12/2021    EGD BAND LIGATION performed by Niko Farley MD at [facility] ENDOSCOPY    UPPER GASTROINTESTINAL ENDOSCOPY N/A 11/24/2021    EGD BAND LIGATION performed by Viloet Kelly MD at [facility] ENDOSCOPY    UPPER GASTROINTESTINAL ENDOSCOPY N/A 12/21/2021    EGD performed by Violet Kelly MD at [facility] ENDOSCOPY    UPPER GASTROINTESTINAL ENDOSCOPY  12/21/2021    EGD BAND LIGATION performed by Violet Kelly MD at [facility] ENDOSCOPY    UPPER GASTROINTESTINAL ENDOSCOPY N/A 1/18/2022    EGD DIAGNOSTIC ONLY performed by Violet Kelly MD at [facility] ENDOSCOPY    UPPER GASTROINTESTINAL ENDOSCOPY  1/18/2022    EGD FOREIGN BODY REMOVAL performed by Violet Kelly MD at [facility] ENDOSCOPY    UPPER GASTROINTESTINAL ENDOSCOPY N/A 1/25/2023    EGD performed by Gian Gonsales MD at [facility] ENDOSCOPY    UPPER GASTROINTESTINAL ENDOSCOPY  1/25/2023    EGD BAND LIGATION performed by Gian Gonsales MD at [facility] ENDOSCOPY    UPPER GASTROINTESTINAL ENDOSCOPY N/A 8/10/2023    ESOPHAGOGASTRODUODENOSCOPY performed by Violet Kelly MD at [facility] ENDOSCOPY    UPPER GASTROINTESTINAL ENDOSCOPY N/A 11/12/2024    ESOPHAGOGASTRODUODENOSCOPY performed by Violet Kelly MD at [facility] ENDOSCOPY       Medications Prior to Admission:   Prior to Admission medications    Medication Sig Start Date End Date Taking? Authorizing Provider   furosemide (LASIX) 20 MG tablet Take 1 tablet by mouth daily 3/4/25   Violet Kelly MD   spironolactone (ALDACTONE) 25 MG tablet Take 1 tablet by mouth daily 3/4/25   Violet Kelly MD   polyethylene glycol (GLYCOLAX) 17 GM/SCOOP powder Take 17 g by mouth daily 3/4/25 8/31/25  Violet eKlly MD   levothyroxine (SYNTHROID) 50 MCG tablet Take 1 tablet by mouth daily 1/18/25   Sari Allison DO   ursodiol (VIRGINIA FORTE) 500 MG tablet Take

## 2025-04-01 NOTE — ED PROVIDER NOTES
This patient was signed out to me pending CT scan.  She had presented to the emergency room for dysphagia over the past 2 to 3 days.  However the patient is very thin with a BMI of 12.7.  She has no abdominal tenderness.  She does not appear toxic.  Mucous membranes are dry labs are unremarkable.  Her CT scan is read and interpreted by the radiologist and images reviewed by myself.  She has dilated esophagus with what appears to be fluid contents.  Radiologist interprets this as concerning for achalasia.    Given her CT scan findings her weight loss and the fact that she is unable to eat I believe it is in the patient's best interest to be admitted to the hospital for further care.  She sees Dr. Kelly with GI for liver disease.  She is agreeable with the plan for admission.  A consult was placed to the hospitalist on duty and the case was discussed via [employer] including patient's presenting complaints and diagnostic workup.        ICD-10-CM    1. Dysphagia, unspecified type  R13.10       2. Achalasia  K22.0       3. Malnutrition, unspecified type  E46             Trini Alexander MD  03/31/25 5756

## 2025-04-01 NOTE — PROGRESS NOTES
Comprehensive Nutrition Assessment    Type and Reason for Visit:  Initial, Positive nutrition screen (Low BMI <18.5.)    Nutrition Recommendations/Plan:   Continue full liquid diet until diet is able to advance per MD.  Add Ensure TID -chocolate.   Encourage po intakes.  Monitor po intakes, nutrition adequacy, weights, pertinent labs, BMs      Malnutrition Assessment:  Malnutrition Status:  Severe malnutrition (04/01/25 1220)    Context:  Acute Illness     Findings of the 6 clinical characteristics of malnutrition:  Energy Intake:  50% or less of estimated energy requirements for 5 or more days  Weight Loss:  Unable to assess (d/t no wt method for CBW)     Body Fat Loss:  Moderate body fat loss Triceps, Orbital, Fat Overlying Ribs, Buccal region   Muscle Mass Loss:  Moderate muscle mass loss Temples (temporalis), Clavicles (pectoralis & deltoids), Hand (interosseous)  Fluid Accumulation:  No fluid accumulation     Strength:  Not Performed    Nutrition Assessment:    Positive nutrition screen pt with wt loss and poor appetite. Pt also seen d/t low BMI. Pt has a PMH of COPD, DM, GERD, cirrhosis. Pt admitted with c/o difficulty swallowing past few days. S/p EGD this morning. Pt reported poor appetite for the past couple of months and has worsen x few days d/t not being able to eat. Pt reported she has noticed wt changes, but is unsure of her UBW. Per EMR, pt's wt was 99 lbs 2 months ago in wt hx. CBW is 74 lbs with no wt method. Offered ONS to pt. Pt agreeble. Will continue to monitor.    Nutrition Related Findings:    No BM documented. Active BS. Na 133. A1c 6.7 on 1/16/25. Wound Type: None       Current Nutrition Intake & Therapies:    Average Meal Intake: Unable to assess (diet order just advanced)  Average Supplements Intake: None Ordered  ADULT DIET; Full Liquid    Anthropometric Measures:  Height: 162.6 cm (5' 4\")  Ideal Body Weight (IBW): 120 lbs (55 kg)       Current Body Weight: 33.6 kg (74 lb),   IBW.

## 2025-04-01 NOTE — OP NOTE
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 Portal hypertensive gastropathy    Anesthesia: Anesthesia: MAC  Sedation: Propofol per anesthesia  Start Time: 10:43  Stop Time: 10:52  ASA Class: 2  Mallampati: II (soft palate, uvula, fauces visible)    Indications: This is a 79-year-old female with medical history of GERD, COPD,  hypothyroidism, rheumatoid arthritis, primary biliary cholangitis (diagnosed 2019) and PBC cirrhosis, esophageal varix s/p banding  admitted for 3/31/25 for dysphagia of several days duration.     CT abdomen and pelvis 3/31/25 noted dilated fluid filled esophagus possible loculation.  Upper lobe fibrosis and emphysema.  Left lower lobe severe bronchiectasis with fluid-filled distended bronchi probable aspiration in the left abdomen.  Splenomegaly and gastric varices.  Ascites.  Colonic diverticulosis.  No suspicious hepatic masses.  Compression deformity of L1.       Procedure Details  Informed consent was obtained for the procedure, including conscious sedation. Risks of pancreatitis, infection, perforation, hemorrhage, adverse drug reaction and aspiration were discussed. The patient was placed in the left lateral decubitus position.  Based on the pre-procedure assessment, including review of the patient's medical history, medications, allergies, and review of systems, she had

## 2025-04-01 NOTE — ED NOTES
Pt assisted to use bedside commode. Mepilex applied to coccyx,elbows and spine to prevent skin breakdown. Bed locked lowest position, non skid socks in place, call light in reach.

## 2025-04-01 NOTE — PROGRESS NOTES
Chart reviewed. Pending GI recs, EGD done. Bx obtained. Seen pt at bedside after procedure. Await to see how pt tolerates advancement in diet.

## 2025-04-01 NOTE — PROGRESS NOTES
4 Eyes Skin Assessment     NAME:  Treasure Fatima  YOB: 1945  MEDICAL RECORD NUMBER:  [id number]    The patient is being assessed for  Admission    I agree that at least one RN has performed a thorough Head to Toe Skin Assessment on the patient. ALL assessment sites listed below have been assessed.      Areas assessed by both nurses:    Head, Face, Ears, Shoulders, Back, Chest, Arms, Elbows, Hands, Sacrum. Buttock, Coccyx, Ischium, and Legs. Feet and Heels        Does the Patient have a Wound? No noted wound(s)       Carlos Prevention initiated by RN: Yes  Wound Care Orders initiated by RN: No    Pressure Injury (Stage 3,4, Unstageable, DTI, NWPT, and Complex wounds) if present, place Wound referral order by RN under : No    New Ostomies, if present place, Ostomy referral order under : No     Nurse 1 eSignature: Electronically signed by Radha Bhatti RN on 4/1/25 at 5:39 AM EDT    **SHARE this note so that the co-signing nurse can place an eSignature**    Nurse 2 eSignature: {Esignature:[id number]}

## 2025-04-01 NOTE — ANESTHESIA PRE PROCEDURE
Department of Anesthesiology  Preprocedure Note       Name:  Treasure Fatima   Age:  79 y.o.  :  1945                                          MRN:  [id number]         Date:  2025      Surgeon: Surgeon(s):  Gregory Romero MD    Procedure: Procedure(s):  ESOPHAGOGASTRODUODENOSCOPY DIAGNOSTIC ONLY    Medications prior to admission:   Prior to Admission medications    Medication Sig Start Date End Date Taking? Authorizing Provider   polyethylene glycol (GLYCOLAX) 17 GM/SCOOP powder Take 17 g by mouth daily 3/4/25 8/31/25 Yes Violet Kelly MD   levothyroxine (SYNTHROID) 50 MCG tablet Take 1 tablet by mouth daily 25  Yes Sari Allison DO   ursodiol (VIRGINIA FORTE) 500 MG tablet Take 1.5 tablets by mouth daily 24  Yes Violet Kelly MD   fluticasone (FLONASE ALLERGY RELIEF) 50 MCG/ACT nasal spray 1 spray by Nasal route daily   Yes ProviderLadi MD   TRELEGY ELLIPTA 100-62.5-25 MCG/ACT AEPB inhaler Inhale 1 puff into the lungs daily   Yes Provider, MD Ladi   prednisoLONE acetate (PRED FORTE) 1 % ophthalmic suspension Place 2 drops into both eyes 4 times daily 24  Yes Ladi Figueroa MD   vitamin B-12 (CYANOCOBALAMIN) 1000 MCG tablet Take 1 tablet by mouth daily   Yes ProviderLadi MD   vitamin D (CHOLECALCIFEROL) 1000 UNIT TABS tablet Take 1 tablet by mouth daily   Yes ProviderLadi MD   albuterol (PROVENTIL HFA) 108 (90 BASE) MCG/ACT inhaler Inhale 2 puffs into the lungs every 4 hours as needed for Shortness of Breath. 12  Yes Delmer Gutierrez MD   furosemide (LASIX) 20 MG tablet Take 1 tablet by mouth daily  Patient not taking: Reported on 2025 3/4/25   Violet Kelly MD   spironolactone (ALDACTONE) 25 MG tablet Take 1 tablet by mouth daily  Patient not taking: Reported on 2025 3/4/25   Violet Kelly MD       Current medications:    Current Facility-Administered Medications   Medication Dose Route Frequency Provider Last Rate Last Admin

## 2025-04-02 LAB
ANION GAP SERPL CALCULATED.3IONS-SCNC: 9 MMOL/L (ref 3–16)
BASOPHILS # BLD: 0 K/UL (ref 0–0.2)
BASOPHILS NFR BLD: 0.3 %
BUN SERPL-MCNC: 26 MG/DL (ref 7–20)
CALCIUM SERPL-MCNC: 8.7 MG/DL (ref 8.3–10.6)
CHLORIDE SERPL-SCNC: 104 MMOL/L (ref 99–110)
CO2 SERPL-SCNC: 20 MMOL/L (ref 21–32)
CREAT SERPL-MCNC: 1 MG/DL (ref 0.6–1.2)
DEPRECATED RDW RBC AUTO: 15.6 % (ref 12.4–15.4)
EOSINOPHIL # BLD: 0.1 K/UL (ref 0–0.6)
EOSINOPHIL NFR BLD: 1.5 %
GFR SERPLBLD CREATININE-BSD FMLA CKD-EPI: 57 ML/MIN/{1.73_M2}
GLUCOSE SERPL-MCNC: 136 MG/DL (ref 70–99)
HCT VFR BLD AUTO: 28.7 % (ref 36–48)
HGB BLD-MCNC: 9.8 G/DL (ref 12–16)
LYMPHOCYTES # BLD: 0.6 K/UL (ref 1–5.1)
LYMPHOCYTES NFR BLD: 8.4 %
MCH RBC QN AUTO: 30 PG (ref 26–34)
MCHC RBC AUTO-ENTMCNC: 34 G/DL (ref 31–36)
MCV RBC AUTO: 88.3 FL (ref 80–100)
MONOCYTES # BLD: 0.4 K/UL (ref 0–1.3)
MONOCYTES NFR BLD: 5.2 %
NEUTROPHILS # BLD: 5.8 K/UL (ref 1.7–7.7)
NEUTROPHILS NFR BLD: 84.6 %
PLATELET # BLD AUTO: 85 K/UL (ref 135–450)
PMV BLD AUTO: 8.4 FL (ref 5–10.5)
POTASSIUM SERPL-SCNC: 4 MMOL/L (ref 3.5–5.1)
RBC # BLD AUTO: 3.26 M/UL (ref 4–5.2)
SODIUM SERPL-SCNC: 133 MMOL/L (ref 136–145)
WBC # BLD AUTO: 6.9 K/UL (ref 4–11)

## 2025-04-02 PROCEDURE — 80048 BASIC METABOLIC PNL TOTAL CA: CPT

## 2025-04-02 PROCEDURE — 6370000000 HC RX 637 (ALT 250 FOR IP): Performed by: FAMILY MEDICINE

## 2025-04-02 PROCEDURE — 2500000003 HC RX 250 WO HCPCS: Performed by: FAMILY MEDICINE

## 2025-04-02 PROCEDURE — 36415 COLL VENOUS BLD VENIPUNCTURE: CPT

## 2025-04-02 PROCEDURE — 6370000000 HC RX 637 (ALT 250 FOR IP): Performed by: INTERNAL MEDICINE

## 2025-04-02 PROCEDURE — 6360000002 HC RX W HCPCS: Performed by: FAMILY MEDICINE

## 2025-04-02 PROCEDURE — 1200000000 HC SEMI PRIVATE

## 2025-04-02 PROCEDURE — 85025 COMPLETE CBC W/AUTO DIFF WBC: CPT

## 2025-04-02 PROCEDURE — 94640 AIRWAY INHALATION TREATMENT: CPT

## 2025-04-02 RX ORDER — LACTULOSE 10 G/15ML
10 SOLUTION ORAL 2 TIMES DAILY
Qty: 60 EACH | Refills: 1 | Status: SHIPPED | OUTPATIENT
Start: 2025-04-02

## 2025-04-02 RX ADMIN — LACTULOSE 10 G: 20 SOLUTION ORAL at 19:59

## 2025-04-02 RX ADMIN — URSODIOL 750 MG: 500 TABLET, FILM COATED ORAL at 10:10

## 2025-04-02 RX ADMIN — Medication 10 ML: at 10:13

## 2025-04-02 RX ADMIN — POLYETHYLENE GLYCOL 3350 17 G: 17 POWDER, FOR SOLUTION ORAL at 09:55

## 2025-04-02 RX ADMIN — Medication 10 ML: at 20:01

## 2025-04-02 RX ADMIN — Medication 2 PUFF: at 08:10

## 2025-04-02 RX ADMIN — SPIRONOLACTONE 25 MG: 25 TABLET ORAL at 09:54

## 2025-04-02 RX ADMIN — LACTULOSE 10 G: 20 SOLUTION ORAL at 09:55

## 2025-04-02 RX ADMIN — HEPARIN SODIUM 5000 UNITS: 5000 INJECTION INTRAVENOUS; SUBCUTANEOUS at 19:59

## 2025-04-02 RX ADMIN — TIOTROPIUM BROMIDE INHALATION SPRAY 2 PUFF: 3.12 SPRAY, METERED RESPIRATORY (INHALATION) at 08:10

## 2025-04-02 RX ADMIN — FUROSEMIDE 20 MG: 20 TABLET ORAL at 09:54

## 2025-04-02 RX ADMIN — PANTOPRAZOLE SODIUM 40 MG: 40 INJECTION, POWDER, FOR SOLUTION INTRAVENOUS at 09:55

## 2025-04-02 RX ADMIN — HEPARIN SODIUM 5000 UNITS: 5000 INJECTION INTRAVENOUS; SUBCUTANEOUS at 09:54

## 2025-04-02 RX ADMIN — LEVOTHYROXINE SODIUM 50 MCG: 0.05 TABLET ORAL at 05:09

## 2025-04-02 NOTE — DISCHARGE INSTR - COC
Continuity of Care Form    Patient Name: Treasure Fatima   :  1945  MRN:  [id number]    Admit date:  3/31/2025  Discharge date:  ***    Code Status Order: Full Code   Advance Directives:    Date/Time Healthcare Directive Type of Healthcare Directive Copy in Chart Healthcare Agent Appointed Healthcare Agent's Name Healthcare Agent's Phone Number    25 1021 No, patient does not have an advance directive for healthcare treatment  --  --  --  --  --             Admitting Physician:  Lyubov Rouse MD  PCP: Keisha Tsang MD    Discharging Nurse: ***  Discharging Hospital Unit/Room#: 0543/0543-01  Discharging Unit Phone Number: ***    Emergency Contact:   Extended Emergency Contact Information  Primary Emergency Contact: Rhett Santos  Home Phone: [phone]  Mobile Phone: [phone]  Relation: Grandchild  Preferred language: English   needed? No  Secondary Emergency Contact: ABHIJEET LACY  Home Phone: [phone]  Mobile Phone: [phone]  Relation: Daughter-in-Law    Past Surgical History:  Past Surgical History:   Procedure Laterality Date    COLONOSCOPY N/A 3/12/2021    COLONOSCOPY DIAGNOSTIC performed by Niko Farley MD at [facility] ENDOSCOPY    FOOT DEBRIDEMENT Left 2024    LEFT FOOT DEBRIDEMENT INCISION AND DRAINAGE WITH PARTIAL FIFTH RAY AMPUTATION. performed by Lelo Montilla MD at [facility] OR    HYSTERECTOMY (CERVIX STATUS UNKNOWN)      UPPER GASTROINTESTINAL ENDOSCOPY N/A 3/12/2021    EGD BAND LIGATION performed by Niko Farley MD at [facility] ENDOSCOPY    UPPER GASTROINTESTINAL ENDOSCOPY N/A 2021    EGD BAND LIGATION performed by Violet Kelly MD at [facility] ENDOSCOPY    UPPER GASTROINTESTINAL ENDOSCOPY N/A 2021    EGD performed by Violet Kelly MD at [facility] ENDOSCOPY    UPPER GASTROINTESTINAL ENDOSCOPY  2021    EGD BAND LIGATION performed by Violet Kelly MD at [facility] ENDOSCOPY    UPPER GASTROINTESTINAL ENDOSCOPY N/A 2022    EGD DIAGNOSTIC

## 2025-04-02 NOTE — PLAN OF CARE
Problem: Discharge Planning  Goal: Discharge to home or other facility with appropriate resources  4/1/2025 2156 by Radha Bhatti RN  Outcome: Progressing  4/1/2025 1509 by Rebecca Choudhary RN  Outcome: Progressing  Flowsheets (Taken 4/1/2025 0437 by Radha Bhatti, RN)  Discharge to home or other facility with appropriate resources: Identify barriers to discharge with patient and caregiver     Problem: Skin/Tissue Integrity  Goal: Skin integrity remains intact  Description: 1.  Monitor for areas of redness and/or skin breakdown  2.  Assess vascular access sites hourly  3.  Every 4-6 hours minimum:  Change oxygen saturation probe site  4.  Every 4-6 hours:  If on nasal continuous positive airway pressure, respiratory therapy assess nares and determine need for appliance change or resting period  4/1/2025 2156 by Radha Bhatti RN  Outcome: Progressing  4/1/2025 1509 by Rebecca Choudhary RN  Outcome: Progressing     Problem: Safety - Adult  Goal: Free from fall injury  4/1/2025 2156 by Radha Bhatti RN  Outcome: Progressing  4/1/2025 1509 by Rebecca Choudhary RN  Outcome: Progressing     Problem: Pain  Goal: Verbalizes/displays adequate comfort level or baseline comfort level  4/1/2025 2156 by Radha Bhatti RN  Outcome: Progressing  4/1/2025 1509 by Rebecca Choudhary RN  Outcome: Progressing     Problem: Nutrition Deficit:  Goal: Optimize nutritional status  4/1/2025 2156 by Radha Bhatti RN  Outcome: Progressing  4/1/2025 1509 by Rebecca Choudhary RN  Outcome: Progressing

## 2025-04-02 NOTE — CARE COORDINATION
CASE MANAGEMENT DISCHARGE SUMMARY      Discharge to: Home-NN    Precertification completed: N/A  Hospital Exemption Notification (HENS) completed: N/A    IMM given: (date) N/A    New Durable Medical Equipment ordered/agency: N/A    Transportation:    Family/car: Personal    Confirmed discharge plan with:     Patient: yes     Family:  yes         RN, name: Rica    Note: Discharging nurse to complete KEON, reconcile AVS, and place final copy with patient's discharge packet. RN to ensure that written prescriptions for  Level II medications are sent with patient to the facility as per protocol.      Elizabeth Vlades, RN    
Identified Issues/Barriers to RETURNING to current housing: Limited help at home  Potential Assistance needed at discharge: N/A            Potential DME:    Patient expects to discharge to: House  Plan for transportation at discharge:      Financial    Payor: [employer] MEDICARE / Plan: [employer] GOLD PLUS HMO / Product Type: *No Product type* /     Does insurance require precert for SNF: Yes    Potential assistance Purchasing Medications: No  Meds-to-Beds request:        [facility] - [facility] [address] - P [phone] - F [phone]  [address] [facility] [address]  Phone: [phone] Fax: [phone]    [id number] - [place], [place] - [address] - P [phone] - F [phone]  [address] [facility] [address] [address]  Phone: [phone] Fax: [phone]    [id number] - [place], [place] - [address] - P [phone] - F [phone] [address] [address]  Phone: [phone] Fax: [phone]      Notes:    Factors facilitating achievement of predicted outcomes: Family support, Cooperative, Pleasant, and Has needed Durable Medical Equipment at home    Barriers to discharge: Limited family support and No caregiver support    Additional Case Management Notes: Patients granddaughter lives with her in a ranch house with 1 NAHID. Patient is IPTA with no AD at baseline. She is an active , and has a PCP. No current services in the home currently. DCP will continue to follow for needs.     The Plan for Transition of Care is related to the following treatment goals of Achalasia of esophagus [K22.0]  Achalasia [K22.0]  Dysphagia, unspecified type [R13.10]  Malnutrition, unspecified type [E46]    IF APPLICABLE: The Patient and/or patient representative Treasure and her family were provided with a choice of provider and agrees with the discharge plan. Freedom of choice list with basic dialogue

## 2025-04-02 NOTE — PLAN OF CARE
Problem: Discharge Planning  Goal: Discharge to home or other facility with appropriate resources  4/2/2025 1018 by Rica Menjivar RN  Outcome: Progressing  4/1/2025 2156 by Radha Bhatti RN  Outcome: Progressing     Problem: Skin/Tissue Integrity  Goal: Skin integrity remains intact  Description: 1.  Monitor for areas of redness and/or skin breakdown  2.  Assess vascular access sites hourly  3.  Every 4-6 hours minimum:  Change oxygen saturation probe site  4.  Every 4-6 hours:  If on nasal continuous positive airway pressure, respiratory therapy assess nares and determine need for appliance change or resting period  4/2/2025 1018 by Rica Menjivar RN  Outcome: Progressing  4/1/2025 2156 by Radha Bhatti RN  Outcome: Progressing     Problem: Safety - Adult  Goal: Free from fall injury  4/2/2025 1018 by Rica Menjivar RN  Outcome: Progressing  4/1/2025 2156 by Radha Bhatti RN  Outcome: Progressing     Problem: Pain  Goal: Verbalizes/displays adequate comfort level or baseline comfort level  4/2/2025 1018 by Rica Menjivar RN  Outcome: Progressing  4/1/2025 2156 by Radha Bhatti RN  Outcome: Progressing     Problem: Nutrition Deficit:  Goal: Optimize nutritional status  4/2/2025 1018 by Rica Menjivar RN  Outcome: Progressing  4/1/2025 2156 by Radha Bhatti RN  Outcome: Progressing

## 2025-04-02 NOTE — PROGRESS NOTES
4 Eyes Skin Assessment     NAME:  Treasure Fatima  YOB: 1945  MEDICAL RECORD NUMBER:  [id number]    The patient is being assessed for  Other Low Carlos    I agree that at least one RN has performed a thorough Head to Toe Skin Assessment on the patient. ALL assessment sites listed below have been assessed.      Areas assessed by both nurses:    Head, Face, Ears, Shoulders, Back, Chest, Arms, Elbows, Hands, Sacrum. Buttock, Coccyx, Ischium, and Legs. Feet and Heels        Does the Patient have a Wound? No noted wound(s)       Carlos Prevention initiated by RN: Yes  Wound Care Orders initiated by RN: No    Pressure Injury (Stage 3,4, Unstageable, DTI, NWPT, and Complex wounds) if present, place Wound referral order by RN under : No    New Ostomies, if present place, Ostomy referral order under : No     Nurse 1 eSignature: Electronically signed by Radha Bhatti RN on 4/2/25 at 3:08 AM EDT    **SHARE this note so that the co-signing nurse can place an eSignature**    Nurse 2 eSignature: Electronically signed by Rush Vazquez RN on 4/2/25 at 3:09 AM EDT

## 2025-04-02 NOTE — PROGRESS NOTES
Gastroenterology Progress Note      Patient Treasure Fatima  MRN: [id number]  YOB: 1945 Age: 79 y.o. Sex: female  Room: [address] [place] [address]       Admitting Physician: Lyubov Rouse MD   Date of Admission: 3/31/2025  6:49 PM   Primary Care Physician: Keisha Tsang MD     Subjective:  We are following for dysphagia and PBC cirrhosis. Treasure Fatima was seen and examined. She is post EGD with esophageal stricture dilation 4/1. Reports doing well. She tolerated clear liquid diet. Denies abdominal pain, nausea, vomiting, fever, chills.     Current Hospital Schedued Meds   furosemide  20 mg Oral Daily    levothyroxine  50 mcg Oral Daily    spironolactone  25 mg Oral Daily    polyethylene glycol  17 g Oral Daily    budesonide-formoterol  2 puff Inhalation BID RT    And    tiotropium  2 puff Inhalation Daily RT    ursodiol  750 mg Oral Daily    sodium chloride flush  5-40 mL IntraVENous 2 times per day    heparin (porcine)  5,000 Units SubCUTAneous BID    pantoprazole  40 mg IntraVENous Daily    lactulose  10 g Oral BID     Current Hospital IV Meds   sodium chloride      lactated ringers 75 mL/hr at 04/01/25 0401     Current Hospital Prn Meds  sodium chloride flush, sodium chloride, ondansetron **OR** ondansetron, polyethylene glycol, acetaminophen **OR** acetaminophen    I/O last 3 completed shifts:  In: 973.4 [P.O.:237; I.V.:236.4; IV Piggyback:500]  Out: 300 [Urine:300]    Physical Exam:  VITAL SIGNS: BP 95/66   Pulse 73   Temp (!) 96.6 °F (35.9 °C) (Axillary)   Resp 18   Ht 1.626 m (5' 4\")   Wt 33.6 kg (74 lb)   SpO2 96%   BMI 12.70 kg/m²   Wt Readings from Last 3 Encounters:   03/31/25 33.6 kg (74 lb)   01/15/25 44.9 kg (99 lb)   11/12/24 40.8 kg (90 lb)     Constitutional: Well developed, Well nourished, No acute distress, Non-toxic appearance.   HENT: Normocephalic, Atraumatic, Bilateral external ears normal, Oropharynx moist, No oral exudates, Nose normal.   Eyes: Conjunctiva normal,

## 2025-04-02 NOTE — PROGRESS NOTES
[facility] Medicine Progress Note  V 1.6      Date of Admission: 3/31/2025    Hospital Day: 3      Chief Admission Complaint:    Chief Complaint   Patient presents with    Dysphagia     The patient says she's been unable to swallow anything for several days. She says liquid and solids won't go down. \"Like there's a blockage\". She doesn't feel that any food is stuck but states nothing will pass. Says something happened before when she was being treated for cirrhosis.      Subjective:    Pt continues to have trouble swallowing for lunch and dinner today. Discharge has been cancelled as she is still unable to eat much at all. I am concerned regarding her malnutrition.     Presenting Admission History:       79 y.o. female with past medical history of COPD, GERD, primary biliary cirrhosis who presented to [facility] ER complaining of difficulty swallowing which has been going on for several days and worsening today where she could note keep any food down.  She reports food gets stuck in the esophagus which causes pain when she tries to swallow..  Denies chest pain, fever or chills. Patient had had an EGD done as recently as November 2024 which was notable for grade 1 esophageal varices and portal hypertensive gastropathy.     Assessment/Plan:      Current Principal Problem:  Achalasia of esophagus    GE junction stricture s/p dilation and biopsy per Dr. Romero on 04/01/25  - EGD on 04/01/25 revealed GE junction stricture s/p esophageal dilation. Portal hypertensive gastropathy. Possible presbyesophagus noted. No varices.  - Soft diet per GI  - Plan for repeat EGD in 4 weeks for further esophageal dilation  - [facility] following, appreciate their input     GERD: Placed on IV Protonix.     COPD: Not in acute exacerbation, continue Symbicort and Spiriva.     Hypothyroidism: Resume Synthroid.     Primary biliary cirrhosis:   - Continue lasix, aldactone, actigall, lactulose    MALNUTRITION ASSESSMENT AND

## 2025-04-03 ENCOUNTER — TELEPHONE (OUTPATIENT)
Dept: INTERVENTIONAL RADIOLOGY/VASCULAR | Age: 80
End: 2025-04-03

## 2025-04-03 VITALS
RESPIRATION RATE: 16 BRPM | DIASTOLIC BLOOD PRESSURE: 55 MMHG | WEIGHT: 74 LBS | SYSTOLIC BLOOD PRESSURE: 104 MMHG | HEART RATE: 82 BPM | TEMPERATURE: 97.7 F | OXYGEN SATURATION: 94 % | BODY MASS INDEX: 12.64 KG/M2 | HEIGHT: 64 IN

## 2025-04-03 LAB
ANION GAP SERPL CALCULATED.3IONS-SCNC: 8 MMOL/L (ref 3–16)
BASOPHILS # BLD: 0 K/UL (ref 0–0.2)
BASOPHILS NFR BLD: 0.4 %
BUN SERPL-MCNC: 19 MG/DL (ref 7–20)
CALCIUM SERPL-MCNC: 9.1 MG/DL (ref 8.3–10.6)
CHLORIDE SERPL-SCNC: 102 MMOL/L (ref 99–110)
CO2 SERPL-SCNC: 23 MMOL/L (ref 21–32)
CREAT SERPL-MCNC: 1 MG/DL (ref 0.6–1.2)
DEPRECATED RDW RBC AUTO: 16 % (ref 12.4–15.4)
EOSINOPHIL # BLD: 0.2 K/UL (ref 0–0.6)
EOSINOPHIL NFR BLD: 3.9 %
GFR SERPLBLD CREATININE-BSD FMLA CKD-EPI: 57 ML/MIN/{1.73_M2}
GLUCOSE SERPL-MCNC: 140 MG/DL (ref 70–99)
HCT VFR BLD AUTO: 32.5 % (ref 36–48)
HGB BLD-MCNC: 10.9 G/DL (ref 12–16)
LYMPHOCYTES # BLD: 0.7 K/UL (ref 1–5.1)
LYMPHOCYTES NFR BLD: 10.6 %
MCH RBC QN AUTO: 29.7 PG (ref 26–34)
MCHC RBC AUTO-ENTMCNC: 33.6 G/DL (ref 31–36)
MCV RBC AUTO: 88.3 FL (ref 80–100)
MONOCYTES # BLD: 0.4 K/UL (ref 0–1.3)
MONOCYTES NFR BLD: 6.7 %
NEUTROPHILS # BLD: 5.1 K/UL (ref 1.7–7.7)
NEUTROPHILS NFR BLD: 78.4 %
PLATELET # BLD AUTO: 96 K/UL (ref 135–450)
PMV BLD AUTO: 8.2 FL (ref 5–10.5)
POTASSIUM SERPL-SCNC: 3.7 MMOL/L (ref 3.5–5.1)
RBC # BLD AUTO: 3.68 M/UL (ref 4–5.2)
SODIUM SERPL-SCNC: 133 MMOL/L (ref 136–145)
WBC # BLD AUTO: 6.4 K/UL (ref 4–11)

## 2025-04-03 PROCEDURE — 6360000002 HC RX W HCPCS: Performed by: FAMILY MEDICINE

## 2025-04-03 PROCEDURE — 85025 COMPLETE CBC W/AUTO DIFF WBC: CPT

## 2025-04-03 PROCEDURE — 6370000000 HC RX 637 (ALT 250 FOR IP): Performed by: FAMILY MEDICINE

## 2025-04-03 PROCEDURE — 2500000003 HC RX 250 WO HCPCS: Performed by: FAMILY MEDICINE

## 2025-04-03 PROCEDURE — 94640 AIRWAY INHALATION TREATMENT: CPT

## 2025-04-03 PROCEDURE — 6370000000 HC RX 637 (ALT 250 FOR IP): Performed by: INTERNAL MEDICINE

## 2025-04-03 PROCEDURE — 80048 BASIC METABOLIC PNL TOTAL CA: CPT

## 2025-04-03 PROCEDURE — 36415 COLL VENOUS BLD VENIPUNCTURE: CPT

## 2025-04-03 RX ADMIN — URSODIOL 750 MG: 500 TABLET, FILM COATED ORAL at 09:34

## 2025-04-03 RX ADMIN — Medication 2 PUFF: at 09:03

## 2025-04-03 RX ADMIN — HEPARIN SODIUM 5000 UNITS: 5000 INJECTION INTRAVENOUS; SUBCUTANEOUS at 09:28

## 2025-04-03 RX ADMIN — SPIRONOLACTONE 25 MG: 25 TABLET ORAL at 09:34

## 2025-04-03 RX ADMIN — LACTULOSE 10 G: 20 SOLUTION ORAL at 09:32

## 2025-04-03 RX ADMIN — LEVOTHYROXINE SODIUM 50 MCG: 0.05 TABLET ORAL at 05:38

## 2025-04-03 RX ADMIN — Medication 10 ML: at 09:32

## 2025-04-03 RX ADMIN — PANTOPRAZOLE SODIUM 40 MG: 40 INJECTION, POWDER, FOR SOLUTION INTRAVENOUS at 09:28

## 2025-04-03 RX ADMIN — TIOTROPIUM BROMIDE INHALATION SPRAY 2 PUFF: 3.12 SPRAY, METERED RESPIRATORY (INHALATION) at 09:02

## 2025-04-03 NOTE — PLAN OF CARE
Problem: Discharge Planning  Goal: Discharge to home or other facility with appropriate resources  4/3/2025 0937 by Rica Menjivar RN  Outcome: Progressing  4/2/2025 2123 by Rhianna Howard RN  Outcome: Progressing     Problem: Skin/Tissue Integrity  Goal: Skin integrity remains intact  Description: 1.  Monitor for areas of redness and/or skin breakdown  2.  Assess vascular access sites hourly  3.  Every 4-6 hours minimum:  Change oxygen saturation probe site  4.  Every 4-6 hours:  If on nasal continuous positive airway pressure, respiratory therapy assess nares and determine need for appliance change or resting period  4/3/2025 0937 by Rica Menjivar RN  Outcome: Progressing  4/2/2025 2123 by Rhianna Howard RN  Outcome: Progressing     Problem: Safety - Adult  Goal: Free from fall injury  4/3/2025 0937 by Rica Menjivar RN  Outcome: Progressing  4/2/2025 2123 by Rhianna Howard RN  Outcome: Progressing     Problem: Pain  Goal: Verbalizes/displays adequate comfort level or baseline comfort level  4/3/2025 0937 by Rica Menjivar RN  Outcome: Progressing  4/2/2025 2123 by Rhianna Howard RN  Outcome: Progressing     Problem: Nutrition Deficit:  Goal: Optimize nutritional status  4/3/2025 0937 by Rica Menjivar RN  Outcome: Progressing  4/2/2025 2123 by Rhianna Howard RN  Outcome: Progressing

## 2025-04-03 NOTE — PROGRESS NOTES
Gastroenterology Progress Note      Patient Treasure Fatima  MRN: [id number]  YOB: 1945 Age: 79 y.o. Sex: female  Room: [address] [place] [address]       Admitting Physician: Lyubov Rouse MD   Date of Admission: 3/31/2025  6:49 PM   Primary Care Physician: Keisha Tsang MD     Subjective:  We are following for dysphagia and PBC cirrhosis. Treasure Fatima was seen and examined. Reports toleration of pasta dinner last night without dysphagia.     Current Hospital Schedued Meds   furosemide  20 mg Oral Daily    levothyroxine  50 mcg Oral Daily    spironolactone  25 mg Oral Daily    polyethylene glycol  17 g Oral Daily    budesonide-formoterol  2 puff Inhalation BID RT    And    tiotropium  2 puff Inhalation Daily RT    ursodiol  750 mg Oral Daily    sodium chloride flush  5-40 mL IntraVENous 2 times per day    heparin (porcine)  5,000 Units SubCUTAneous BID    pantoprazole  40 mg IntraVENous Daily    lactulose  10 g Oral BID     Current Hospital IV Meds   sodium chloride      lactated ringers 75 mL/hr at 04/01/25 0401     Current Hospital Prn Meds  sodium chloride flush, sodium chloride, ondansetron **OR** ondansetron, polyethylene glycol, acetaminophen **OR** acetaminophen    I/O last 3 completed shifts:  In: -   Out: 500 [Urine:500]    Physical Exam:  VITAL SIGNS: BP (!) 101/58   Pulse 81   Temp 98.2 °F (36.8 °C) (Oral)   Resp 16   Ht 1.626 m (5' 4\")   Wt 33.6 kg (74 lb)   SpO2 91%   BMI 12.70 kg/m²   Wt Readings from Last 3 Encounters:   03/31/25 33.6 kg (74 lb)   01/15/25 44.9 kg (99 lb)   11/12/24 40.8 kg (90 lb)     Constitutional: Well developed, Well nourished, No acute distress, Non-toxic appearance.   HENT: Normocephalic, Atraumatic, Bilateral external ears normal, Oropharynx moist, No oral exudates, Nose normal.   Eyes: Conjunctiva normal, No discharge.   Neck: Normal range of motion, No tenderness, Supple  Cardiovascular: Normal heart rate, Normal rhythm  Thorax & Lungs: Normal

## 2025-04-03 NOTE — DISCHARGE SUMMARY
lymphadenopathy. Bones: Degenerative changes dorsal spine. Compression deformity L1 50% loss of vertebral body height. ABDOMEN/PELVIS: Abdomen: Liver: No suspicious hepatic masses. Bile Ducts: Normal caliber. Gallbladder: Distended gallbladder with wall thickening. There are calculi and possible pericholecystic fluid may represent acute cholecystitis correlate clinically Pancreas: Diffuse pancreatic atrophy no mass Spleen: Borderline splenomegaly. Spleen is 11 cm in length. Splenic and gastric varices. Adrenals: No suspicious adrenal enlargement. Kidneys: There is contrast excreted by both kidneys and in the renal pelvis bilaterally Ureters: within normal limits. Pelvis: Reproductive Organs: Uterus is not identified. Correlate with surgical history Bladder: Mildly distended urinary bladder. No masses Appendix: The appendix is not visualized Bowel: Moderate colonic stool. No small bowel distention. Sigmoid diverticulosis without diverticulitis Mesenteric Lymph Nodes: No enlarged mesenteric lymph nodes. Peritoneum: Moderate ascites. No evidence of pneumoperitoneum Vessels: Atherosclerotic changes . Retroperitoneum: within normal limits. Abdominal Wall: No large hernia nor abdominal wall mass. Bones: Compression deformity L1 with 50% loss vertebral body height      1. Dilated fluid and fluid filled esophagus throughout its course possible achalasia. Patient had increased risk for aspiration. 2. Upper lobe fibrosis and emphysema. 3. Left lower lobe severe bronchiectasis with fluid-filled dependent bronchi probably aspiration and developing pneumonia. 4. Splenomegaly and gastric varices likely liver cirrhosis. 5. Ascites. 6. Colonic diverticulosis 7. Compression deformity L1 50% loss vertebral body height. Electronically signed by Danay Tamez      Consults:     IP CONSULT TO HOSPITALIST  IP CONSULT TO GI    Labs:     Recent Labs     04/01/25  0514 04/02/25  0444 04/03/25  0537   WBC 5.1 6.9 6.4   HGB 10.6* 9.8*

## 2025-04-03 NOTE — TELEPHONE ENCOUNTER
This RN called patient at [phone] for follow up regarding the IV extravasation with contrast that occurred on 3/31/25. No answer and unable to leave voicemail.

## 2025-04-15 ENCOUNTER — RESULTS FOLLOW-UP (OUTPATIENT)
Dept: GASTROENTEROLOGY | Age: 80
End: 2025-04-15

## 2025-04-15 RX ORDER — FLUCONAZOLE 100 MG/1
TABLET ORAL
Qty: 15 TABLET | Refills: 0 | Status: SHIPPED | OUTPATIENT
Start: 2025-04-15 | End: 2025-04-29

## 2025-06-02 NOTE — PROGRESS NOTES
Treasure D Kinhalt    Age 80 y.o.    female    1945    N [id number]    6/10/2025  Arrival Time_____________  OR Time____________30 Min     Procedure(s):  ESOPHAGOGASTRODUODENOSCOPY                      Monitor Anesthesia Care    Surgeon(s):  Violet Kelly, MD       Phone [phone] (home)     [facility]  Date  Info Source  Home  Cell         Work  _____________________________________________________________________  _____________________________________________________________________  _____________________________________________________________________  _____________________________________________________________________  _____________________________________________________________________    PCP _____________________________ Phone_________________     H&P  ________________  Bringing      Chart              Epic      DOS      Called________  EKG ________________   Bringing      Chart              Epic      DOS      Called________  LABS________________   Bringing     Chart              Epic      DOS      Called________  Cardiac Clearance ______ Bringing      Chart              Epic      DOS      Called________  Pulmonary Clearance____ Bringing      Chart              Epic      DOS      Called________    Cardiologist________________________ Phone___________________________  Pulmonologist_______________________Phone___________________________      ? Blood Refusal / Waiver on Chart            PAT Communications________________  ? Pre-op Instructions Given /Understood          _________________________________  ? Directions to Surgery Center                          _________________________________  ? Transportation Home_______________      __________________________________  ? Crutches/Walker__________________        __________________________________    Orders: Hard copy/ EPIC                 Transcribed/ EPIC                  ________Pharmacy                         ________WEIGHT    COVID + _______DATE

## 2025-06-06 NOTE — PROGRESS NOTES
PAT call done w/ pt's grandson. He did not know all of pt's medications. Reviewed meds listed for pt and what pt could take day of procedure.

## 2025-06-06 NOTE — PROGRESS NOTES
Date and time of surgery : 06/10/2025 8:00 am             Arrival Time:  7:00 am     Bring Picture ID and insurance card.  Please wear simple, loose fitting clothing to the hospital.   Do not bring valuables (money, credit cards, checkbooks, etc.)   Do not wear any makeup (including  eye makeup) and no nail polish or artificial nails on your fingers or toes.  DO NOT wear any jewelry or piercings on day of surgery.  All body piercing jewelry must be removed.  If you have dentures, they will be removed before going to the OR; we will provide you a container.  If you wear contact lenses or glasses, they will be removed; please bring a case for them.  Shower the evening before or morning of surgery.  Nothing to eat or drink after midnight the day before surgery. No hard candy, gum, ice chips, sips of water (except with medication).  You may brush your teeth and gargle the morning of surgery.  DO NOT SWALLOW WATER.   Ok to use inhalers and nasal spray morning of procedure.  Ok to take Levothyroxine with a sip of water morning of procedure.  Aspirin, Ibuprofen, Advil, Naproxen, Vitamin E and other Anti-inflammatory products and supplements should be stopped for 5 -7days before surgery or as directed by your physician.  Do not smoke or drink any alcoholic beverages 24 hours prior to surgery.  This includes NA Beer. Refrain from the usage of any recreational drugs, including non-prescribed prescription drugs.   You MUST plan for a responsible adult to stay on site while you are here and take you home after your surgery. You will not be allowed to leave alone or drive yourself home. It is strongly suggested someone stay with you the first 24 hrs. Your surgery will be cancelled if you do not have a ride home.  To help prevent infection, change your sheets the night before surgery.   If you  have a Living Will and Durable Power of  for Healthcare, please bring in a copy.  Notify your Surgeon if you develop any

## 2025-06-09 ENCOUNTER — ANESTHESIA EVENT (OUTPATIENT)
Dept: ENDOSCOPY | Age: 80
End: 2025-06-09
Payer: MEDICARE

## 2025-06-09 NOTE — ANESTHESIA PRE PROCEDURE
Department of Anesthesiology  Preprocedure Note       Name:  Treasure Fatima   Age:  80 y.o.  :  1945                                          MRN:  [id number]         Date:  2025      Surgeon: Surgeon(s):  Violet Kelly MD    Procedure: Procedure(s):  ESOPHAGOGASTRODUODENOSCOPY    Medications prior to admission:   Prior to Admission medications    Medication Sig Start Date End Date Taking? Authorizing Provider   fluconazole (DIFLUCAN) 100 MG tablet Take 1 tablet by mouth daily    Ladi Figueroa MD   lactulose (CHRONULAC) 10 GM/15ML solution Take 15 mLs by mouth 2 times daily 25   Cony Rowland Ruth, APRN   furosemide (LASIX) 20 MG tablet Take 1 tablet by mouth daily  Patient taking differently: Take 1 tablet by mouth daily as needed 3/4/25   Violet Kelly MD   spironolactone (ALDACTONE) 25 MG tablet Take 1 tablet by mouth daily  Patient not taking: Reported on 2025 3/4/25   Violet Kelly MD   polyethylene glycol (GLYCOLAX) 17 GM/SCOOP powder Take 17 g by mouth daily 3/4/25 8/31/25  Violet Kelly MD   levothyroxine (SYNTHROID) 50 MCG tablet Take 1 tablet by mouth daily 25   Sari Allison DO   ursodiol ([place] FORTE) 500 MG tablet Take 1.5 tablets by mouth daily 24   Violet Kelly MD   fluticasone (FLONASE ALLERGY RELIEF) 50 MCG/ACT nasal spray 1 spray by Nasal route daily    Ladi Figueroa MD TRELEGY ELLIPTA 100-62.5-25 MCG/ACT AEPB inhaler Inhale 1 puff into the lungs daily    Ladi Figueroa MD   prednisoLONE acetate (PRED FORTE) 1 % ophthalmic suspension Place 2 drops into both eyes 4 times daily 24   Ladi Figueroa MD   vitamin B-12 (CYANOCOBALAMIN) 1000 MCG tablet Take 1 tablet by mouth daily    Ladi Figueroa MD   vitamin D (CHOLECALCIFEROL) 1000 UNIT TABS tablet Take 1 tablet by mouth daily    Ladi Figueroa MD   albuterol (PROVENTIL HFA) 108 (90 BASE) MCG/ACT inhaler Inhale 2 puffs into the lungs every 4 hours as needed

## 2025-06-10 ENCOUNTER — HOSPITAL ENCOUNTER (OUTPATIENT)
Age: 80
Setting detail: OUTPATIENT SURGERY
Discharge: HOME | End: 2025-06-10
Attending: INTERNAL MEDICINE | Admitting: INTERNAL MEDICINE
Payer: MEDICARE

## 2025-06-10 ENCOUNTER — ANESTHESIA (OUTPATIENT)
Dept: ENDOSCOPY | Age: 80
End: 2025-06-10
Payer: MEDICARE

## 2025-06-10 VITALS
OXYGEN SATURATION: 100 % | HEIGHT: 64 IN | BODY MASS INDEX: 15.71 KG/M2 | WEIGHT: 92 LBS | SYSTOLIC BLOOD PRESSURE: 107 MMHG | RESPIRATION RATE: 16 BRPM | HEART RATE: 70 BPM | DIASTOLIC BLOOD PRESSURE: 60 MMHG | TEMPERATURE: 97.6 F

## 2025-06-10 PROCEDURE — 2709999900 HC NON-CHARGEABLE SUPPLY: Performed by: INTERNAL MEDICINE

## 2025-06-10 PROCEDURE — 6360000002 HC RX W HCPCS: Performed by: REGISTERED NURSE

## 2025-06-10 PROCEDURE — 3609017700 HC EGD DILATION GASTRIC/DUODENAL STRICTURE: Performed by: INTERNAL MEDICINE

## 2025-06-10 PROCEDURE — 7100000010 HC PHASE II RECOVERY - FIRST 15 MIN: Performed by: INTERNAL MEDICINE

## 2025-06-10 PROCEDURE — 7100000011 HC PHASE II RECOVERY - ADDTL 15 MIN: Performed by: INTERNAL MEDICINE

## 2025-06-10 PROCEDURE — 2580000003 HC RX 258: Performed by: ANESTHESIOLOGY

## 2025-06-10 PROCEDURE — 3700000000 HC ANESTHESIA ATTENDED CARE: Performed by: INTERNAL MEDICINE

## 2025-06-10 PROCEDURE — C1726 CATH, BAL DIL, NON-VASCULAR: HCPCS | Performed by: INTERNAL MEDICINE

## 2025-06-10 RX ORDER — LIDOCAINE HYDROCHLORIDE 20 MG/ML
INJECTION, SOLUTION EPIDURAL; INFILTRATION; INTRACAUDAL; PERINEURAL
Status: DISCONTINUED | OUTPATIENT
Start: 2025-06-10 | End: 2025-06-10 | Stop reason: SDUPTHER

## 2025-06-10 RX ORDER — SODIUM CHLORIDE 0.9 % (FLUSH) 0.9 %
5-40 SYRINGE (ML) INJECTION EVERY 12 HOURS SCHEDULED
Status: DISCONTINUED | OUTPATIENT
Start: 2025-06-10 | End: 2025-06-10 | Stop reason: HOSPADM

## 2025-06-10 RX ORDER — LABETALOL HYDROCHLORIDE 5 MG/ML
10 INJECTION, SOLUTION INTRAVENOUS
Status: DISCONTINUED | OUTPATIENT
Start: 2025-06-10 | End: 2025-06-10 | Stop reason: HOSPADM

## 2025-06-10 RX ORDER — MIDAZOLAM HYDROCHLORIDE 1 MG/ML
2 INJECTION, SOLUTION INTRAMUSCULAR; INTRAVENOUS
Status: DISCONTINUED | OUTPATIENT
Start: 2025-06-10 | End: 2025-06-10 | Stop reason: HOSPADM

## 2025-06-10 RX ORDER — DIPHENHYDRAMINE HYDROCHLORIDE 50 MG/ML
12.5 INJECTION, SOLUTION INTRAMUSCULAR; INTRAVENOUS
Status: DISCONTINUED | OUTPATIENT
Start: 2025-06-10 | End: 2025-06-10 | Stop reason: HOSPADM

## 2025-06-10 RX ORDER — SODIUM CHLORIDE 0.9 % (FLUSH) 0.9 %
5-40 SYRINGE (ML) INJECTION PRN
Status: DISCONTINUED | OUTPATIENT
Start: 2025-06-10 | End: 2025-06-10 | Stop reason: HOSPADM

## 2025-06-10 RX ORDER — SODIUM CHLORIDE 9 MG/ML
INJECTION, SOLUTION INTRAVENOUS PRN
Status: DISCONTINUED | OUTPATIENT
Start: 2025-06-10 | End: 2025-06-10 | Stop reason: HOSPADM

## 2025-06-10 RX ORDER — ONDANSETRON 2 MG/ML
4 INJECTION INTRAMUSCULAR; INTRAVENOUS
Status: DISCONTINUED | OUTPATIENT
Start: 2025-06-10 | End: 2025-06-10 | Stop reason: HOSPADM

## 2025-06-10 RX ORDER — OXYCODONE HYDROCHLORIDE 5 MG/1
5 TABLET ORAL PRN
Status: DISCONTINUED | OUTPATIENT
Start: 2025-06-10 | End: 2025-06-10 | Stop reason: HOSPADM

## 2025-06-10 RX ORDER — PROPOFOL 10 MG/ML
INJECTION, EMULSION INTRAVENOUS
Status: DISCONTINUED | OUTPATIENT
Start: 2025-06-10 | End: 2025-06-10 | Stop reason: SDUPTHER

## 2025-06-10 RX ORDER — CARVEDILOL 3.12 MG/1
3.12 TABLET ORAL 2 TIMES DAILY
Qty: 180 TABLET | Refills: 0 | Status: SHIPPED | OUTPATIENT
Start: 2025-06-10

## 2025-06-10 RX ORDER — SODIUM CHLORIDE, SODIUM LACTATE, POTASSIUM CHLORIDE, CALCIUM CHLORIDE 600; 310; 30; 20 MG/100ML; MG/100ML; MG/100ML; MG/100ML
INJECTION, SOLUTION INTRAVENOUS CONTINUOUS
Status: DISCONTINUED | OUTPATIENT
Start: 2025-06-10 | End: 2025-06-10 | Stop reason: HOSPADM

## 2025-06-10 RX ORDER — OXYCODONE HYDROCHLORIDE 5 MG/1
10 TABLET ORAL PRN
Status: DISCONTINUED | OUTPATIENT
Start: 2025-06-10 | End: 2025-06-10 | Stop reason: HOSPADM

## 2025-06-10 RX ORDER — DROPERIDOL 2.5 MG/ML
0.62 INJECTION, SOLUTION INTRAMUSCULAR; INTRAVENOUS
Status: DISCONTINUED | OUTPATIENT
Start: 2025-06-10 | End: 2025-06-10 | Stop reason: HOSPADM

## 2025-06-10 RX ORDER — NALOXONE HYDROCHLORIDE 0.4 MG/ML
INJECTION, SOLUTION INTRAMUSCULAR; INTRAVENOUS; SUBCUTANEOUS PRN
Status: DISCONTINUED | OUTPATIENT
Start: 2025-06-10 | End: 2025-06-10 | Stop reason: HOSPADM

## 2025-06-10 RX ADMIN — PROPOFOL 30 MG: 10 INJECTION, EMULSION INTRAVENOUS at 08:04

## 2025-06-10 RX ADMIN — PROPOFOL 60 MG: 10 INJECTION, EMULSION INTRAVENOUS at 07:59

## 2025-06-10 RX ADMIN — SODIUM CHLORIDE, SODIUM LACTATE, POTASSIUM CHLORIDE, AND CALCIUM CHLORIDE: .6; .31; .03; .02 INJECTION, SOLUTION INTRAVENOUS at 07:53

## 2025-06-10 RX ADMIN — PROPOFOL 50 MG: 10 INJECTION, EMULSION INTRAVENOUS at 08:01

## 2025-06-10 RX ADMIN — LIDOCAINE HYDROCHLORIDE 60 MG: 20 INJECTION, SOLUTION EPIDURAL; INFILTRATION; INTRACAUDAL; PERINEURAL at 07:59

## 2025-06-10 ASSESSMENT — PAIN - FUNCTIONAL ASSESSMENT
PAIN_FUNCTIONAL_ASSESSMENT: 0-10
PAIN_FUNCTIONAL_ASSESSMENT: NONE - DENIES PAIN

## 2025-06-10 ASSESSMENT — PAIN SCALES - GENERAL
PAINLEVEL_OUTOF10: 0

## 2025-06-10 NOTE — DISCHARGE INSTRUCTIONS
TAKING BIRTH CONTROL PILLS:  You may have received a medication during your procedure that interferes with the   actions of birth control pills (Bridion or Emend). Use some other kind of birth control in addition to your pills, like a condom, for 1 month after your procedure to prevent unwanted pregnancy.    The following instructions are to be followed if you have a known history or diagnosis of sleep apnea:  For all sleep apnea patients:  ? Sleep on your side or sitting up in a chair whenever possible, especially the first 24 hours after surgery.  ? Use only medicines prescribed by your doctor.    ? Do not drink alcohol.  ? If you have a dental device to assist you while at rest, use it at all times for the first 24 hours.  For patients using CPAP machines:  ? Use your CPAP machine during all periods of sleep as usual.  ? Use your CPAP machine during all periods of daytime rest while on pain medicines.  ** Follow up with your primary care doctor for continued care.    IF YOU DO NOT TAKE ALL OF YOUR NARCOTIC PAIN MEDICATION, please dispose of them responsibly. There are drop off boxes in the Emergency Departments 24/7 at both [facility]. If these locations are not convenient, other options for discarding them can be found at:  [email or web address]    Hospital or office staff may NOT accept any medications to drop off in the cabinet for you.

## 2025-06-10 NOTE — H&P
Gastroenterology Note             Pre-operative History and Physical    Patient: Treasure Fatima  : 1945  CSN:     History Obtained From:  patient and/or guardian.     HISTORY OF PRESENT ILLNESS:    The patient is a 80 y.o. female  here for EGD    Past Medical History:    Past Medical History:   Diagnosis Date    Acid reflux     Arthritis     COPD (chronic obstructive pulmonary disease) (HCC)     Pneumonia     Primary biliary cirrhosis (HCC)     Thyroid disease     hypothyroidism     Past Surgical History:    Past Surgical History:   Procedure Laterality Date    COLONOSCOPY N/A 3/12/2021    COLONOSCOPY DIAGNOSTIC performed by Niko Farlye MD at [facility] ENDOSCOPY    FOOT DEBRIDEMENT Left 2024    LEFT FOOT DEBRIDEMENT INCISION AND DRAINAGE WITH PARTIAL FIFTH RAY AMPUTATION. performed by Lelo Montilla MD at [facility] OR    HYSTERECTOMY (CERVIX STATUS UNKNOWN)      UPPER GASTROINTESTINAL ENDOSCOPY N/A 3/12/2021    EGD BAND LIGATION performed by Niko Farley MD at [facility] ENDOSCOPY    UPPER GASTROINTESTINAL ENDOSCOPY N/A 2021    EGD BAND LIGATION performed by Violet Kelly MD at [facility] ENDOSCOPY    UPPER GASTROINTESTINAL ENDOSCOPY N/A 2021    EGD performed by Violet Kelly MD at [facility] ENDOSCOPY    UPPER GASTROINTESTINAL ENDOSCOPY  2021    EGD BAND LIGATION performed by Violet Kelly MD at [facility] ENDOSCOPY    UPPER GASTROINTESTINAL ENDOSCOPY N/A 2022    EGD DIAGNOSTIC ONLY performed by Violet Kelly MD at [facility] ENDOSCOPY    UPPER GASTROINTESTINAL ENDOSCOPY  2022    EGD FOREIGN BODY REMOVAL performed by Violet Kelly MD at [facility] ENDOSCOPY    UPPER GASTROINTESTINAL ENDOSCOPY N/A 2023    EGD performed by Gian Gonsales MD at [facility] ENDOSCOPY    UPPER GASTROINTESTINAL ENDOSCOPY  2023    EGD BAND LIGATION performed by Gian Gonsales MD at [facility] ENDOSCOPY    UPPER GASTROINTESTINAL ENDOSCOPY N/A 8/10/2023    ESOPHAGOGASTRODUODENOSCOPY performed

## 2025-06-10 NOTE — ANESTHESIA POSTPROCEDURE EVALUATION
3.7                 04/03/2025 05:37 AM        K                        5.1                 03/31/2025 08:03 PM        CL                       102                 04/03/2025 05:37 AM        CO2                      23                  04/03/2025 05:37 AM        BUN                      19                  04/03/2025 05:37 AM        CREATININE               1.0                 04/03/2025 05:37 AM        GLUCOSE                  140 (H)             04/03/2025 05:37 AM   COAGS  Lab Results       Component                Value               Date/Time                  PROTIME                  15.0 (H)            04/01/2025 09:39 AM        INR                      1.16 (H)            04/01/2025 09:39 AM        APTT                     25.5                03/12/2021 10:10 AM     Intake & Output:  @24HRIO@    Nausea & Vomiting:  No    Level of Consciousness:  Awake    Pain Assessment:  Adequate analgesia    Anesthesia Complications:  No apparent anesthetic complications    SUMMARY      Vital signs stable  OK to discharge from Stage I post anesthesia care.  Care transferred from Anesthesiology department on discharge from perioperative area       No notable events documented.

## 2025-06-10 NOTE — PROGRESS NOTES
Anesthesia at bedside to evaluate pt.  Pt meets discharge criteria.  Pt tolerating beverage without nausea.  Denies pain

## (undated) DEVICE — ENDOSCOPIC KIT 2 12 FT OP4 DE2 GWN SYR

## (undated) DEVICE — RETRIEVER ENDOSCP L230CM DIA2.5MM NET W3XL5.5CM MIN WRK CHN

## (undated) DEVICE — CONMED SCOPE SAVER BITE BLOCK, 20X27 MM: Brand: SCOPE SAVER

## (undated) DEVICE — SOLUTION IRRIG 2000ML 0.9% SOD CHL USP UROMATIC PLAS CONT

## (undated) DEVICE — ELECTRODE ECG MONITR FOAM TEAR DROP ADLT RED

## (undated) DEVICE — ELECTRODE,ECG,STRESS,FOAM,3PK: Brand: MEDLINE

## (undated) DEVICE — FORMALIN  10%NBF 20ML PREFLL CONT

## (undated) DEVICE — SUTURE ETHILON SZ 3-0 L30IN NONABSORBABLE BLK L30MM PSLX 3/8 1691H

## (undated) DEVICE — CANNULA NSL AD TBNG L7FT PVC STR NONFLARED PRNG O2 DEL W STD

## (undated) DEVICE — GLOVE SURG SZ 65 THK91MIL LTX FREE SYN POLYISOPRENE

## (undated) DEVICE — CANNULA SAMP CO2 AD GRN 7FT CO2 AND 7FT O2 TBNG UNIV CONN

## (undated) DEVICE — ESOPHAGEAL BALLOON DILATATION CATHETER: Brand: CRE FIXED WIRE

## (undated) DEVICE — Z DISCONTINUED USE 2276105 GOWN PROTCT UNIV CHST W28IN L49IN SL 24IN BLU SPUNBOND FLM

## (undated) DEVICE — WET SKIN SCRUB PREP TRAY: Brand: KENDALL

## (undated) DEVICE — DRESSING,GAUZE,XEROFORM,CURAD,1"X8",ST: Brand: CURAD

## (undated) DEVICE — SUTURE VICRYL + SZ 3-0 L27IN ABSRB UD L26MM SH 1/2 CIR VCP416H

## (undated) DEVICE — BANDAGE,GAUZE,4.5"X4.1YD,STERILE,LF: Brand: MEDLINE

## (undated) DEVICE — YANKAUER,OPEN TIP,W/O VENT,STERILE: Brand: MEDLINE INDUSTRIES, INC.

## (undated) DEVICE — SYRINGE INFL 60ML DISP ALLIANCE II

## (undated) DEVICE — CONTAINER,SPECIMEN,OR STERILE,4OZ: Brand: MEDLINE

## (undated) DEVICE — STOCKINETTE,IMPERVIOUS,12X48,STERILE: Brand: MEDLINE

## (undated) DEVICE — CO2 CANN,MICROFILTER,ADULT,14',NASAL: Brand: MEDLINE

## (undated) DEVICE — PAD,ABDOMINAL,8"X10",ST,LF: Brand: MEDLINE

## (undated) DEVICE — LOWER EXTREMITY: Brand: MEDLINE INDUSTRIES, INC.

## (undated) DEVICE — RESERVOIR,SUCTION,100CC,SILICONE: Brand: MEDLINE

## (undated) DEVICE — ELECTRODE ELECSURG NDL 2.8 INX7.2 CM COAT INSUL EDGE

## (undated) DEVICE — GLOVE ORANGE PI 7   MSG9070

## (undated) DEVICE — DRAIN SURG 10FR L1/8IN RND CHN FULL FLUT HEAT POLISHED PERF

## (undated) DEVICE — BASIC SINGLE BASIN 1-LF: Brand: MEDLINE INDUSTRIES, INC.

## (undated) DEVICE — FORCEPS ENDOSCP L230CM WRK CHN 2.8CM SHTH 2.4MM JAW OPN

## (undated) DEVICE — LIGATOR ENDOSCP 2.8 MM 8.6-11.5 MM SPDBND SUPERVIEW SUPER 7

## (undated) DEVICE — BLADE,STAINLESS-STEEL,15,STRL,DISPOSABLE: Brand: MEDLINE

## (undated) DEVICE — PACK SURG LIGATOR 8.6-11.6 MM PK SMARTBAND SAFEGRIP LF

## (undated) DEVICE — LIGATOR ENDOSCP DIA8.6-11.5MM MULT DISP SPDBND LIGATOR SUP

## (undated) DEVICE — DRESSING STERILE PETRO W3XL8IN N ADH OIL EMUL GZ CURAD

## (undated) DEVICE — BNDG,ELSTC,MATRIX,STRL,4"X5YD,LF,HOOK&LP: Brand: MEDLINE

## (undated) DEVICE — MASK POM PROCEDURE OXY W/ HI CONCENTRATION CO2 MONITOR

## (undated) DEVICE — PRECISION THIN (9.0 X 0.38 X 25.0MM)

## (undated) DEVICE — HANDPIECE SET WITH HIGH FLOW TIP AND SUCTION TUBE: Brand: INTERPULSE